# Patient Record
Sex: FEMALE | Race: WHITE
[De-identification: names, ages, dates, MRNs, and addresses within clinical notes are randomized per-mention and may not be internally consistent; named-entity substitution may affect disease eponyms.]

---

## 2018-02-09 ENCOUNTER — HOSPITAL ENCOUNTER (INPATIENT)
Dept: HOSPITAL 92 - ERS | Age: 58
LOS: 8 days | Discharge: NURSING, REHAB OR LONG-TERM CARE | DRG: 871 | End: 2018-02-17
Attending: INTERNAL MEDICINE | Admitting: INTERNAL MEDICINE
Payer: MEDICARE

## 2018-02-09 VITALS — BODY MASS INDEX: 48 KG/M2

## 2018-02-09 DIAGNOSIS — G35: ICD-10-CM

## 2018-02-09 DIAGNOSIS — Z79.899: ICD-10-CM

## 2018-02-09 DIAGNOSIS — E66.01: ICD-10-CM

## 2018-02-09 DIAGNOSIS — Z93.3: ICD-10-CM

## 2018-02-09 DIAGNOSIS — D69.59: ICD-10-CM

## 2018-02-09 DIAGNOSIS — Z88.1: ICD-10-CM

## 2018-02-09 DIAGNOSIS — A41.9: Primary | ICD-10-CM

## 2018-02-09 DIAGNOSIS — J18.9: ICD-10-CM

## 2018-02-09 DIAGNOSIS — I73.00: ICD-10-CM

## 2018-02-09 DIAGNOSIS — E86.0: ICD-10-CM

## 2018-02-09 DIAGNOSIS — D75.1: ICD-10-CM

## 2018-02-09 DIAGNOSIS — F32.9: ICD-10-CM

## 2018-02-09 DIAGNOSIS — B00.1: ICD-10-CM

## 2018-02-09 DIAGNOSIS — R32: ICD-10-CM

## 2018-02-09 DIAGNOSIS — E87.6: ICD-10-CM

## 2018-02-09 DIAGNOSIS — I10: ICD-10-CM

## 2018-02-09 LAB
ALBUMIN SERPL BCG-MCNC: 3.7 G/DL (ref 3.5–5)
ALP SERPL-CCNC: 146 U/L (ref 40–150)
ALT SERPL W P-5'-P-CCNC: 25 U/L (ref 8–55)
ANION GAP SERPL CALC-SCNC: 19 MMOL/L (ref 10–20)
AST SERPL-CCNC: 24 U/L (ref 5–34)
BASOPHILS # BLD AUTO: 0.1 THOU/UL (ref 0–0.2)
BASOPHILS NFR BLD AUTO: 0.6 % (ref 0–1)
BILIRUB SERPL-MCNC: 0.8 MG/DL (ref 0.2–1.2)
BUN SERPL-MCNC: 4 MG/DL (ref 9.8–20.1)
CALCIUM SERPL-MCNC: 10 MG/DL (ref 7.8–10.44)
CHLORIDE SERPL-SCNC: 99 MMOL/L (ref 98–107)
CK MB SERPL-MCNC: 0.3 NG/ML (ref 0–6.6)
CK SERPL-CCNC: 26 U/L (ref 29–168)
CO2 SERPL-SCNC: 26 MMOL/L (ref 22–29)
CREAT CL PREDICTED SERPL C-G-VRATE: 0 ML/MIN (ref 70–130)
EOSINOPHIL # BLD AUTO: 0.5 THOU/UL (ref 0–0.7)
EOSINOPHIL NFR BLD AUTO: 3.1 % (ref 0–10)
GLOBULIN SER CALC-MCNC: 4.2 G/DL (ref 2.4–3.5)
GLUCOSE SERPL-MCNC: 104 MG/DL (ref 70–105)
HGB BLD-MCNC: 17 G/DL (ref 12–16)
LYMPHOCYTES # BLD: 2.6 THOU/UL (ref 1.2–3.4)
LYMPHOCYTES NFR BLD AUTO: 15.1 % (ref 21–51)
MCH RBC QN AUTO: 29.6 PG (ref 27–31)
MCV RBC AUTO: 90.6 FL (ref 81–99)
MONOCYTES # BLD AUTO: 1.3 THOU/UL (ref 0.11–0.59)
MONOCYTES NFR BLD AUTO: 7.5 % (ref 0–10)
NEUTROPHILS # BLD AUTO: 12.4 THOU/UL (ref 1.4–6.5)
NEUTROPHILS NFR BLD AUTO: 73.7 % (ref 42–75)
PLATELET # BLD AUTO: 438 THOU/UL (ref 130–400)
POTASSIUM SERPL-SCNC: 3.8 MMOL/L (ref 3.5–5.1)
RBC # BLD AUTO: 5.74 MILL/UL (ref 4.2–5.4)
SODIUM SERPL-SCNC: 140 MMOL/L (ref 136–145)
TROPONIN I SERPL DL<=0.01 NG/ML-MCNC: (no result) NG/ML (ref ?–0.03)
WBC # BLD AUTO: 16.9 THOU/UL (ref 4.8–10.8)

## 2018-02-09 PROCEDURE — 96365 THER/PROPH/DIAG IV INF INIT: CPT

## 2018-02-09 PROCEDURE — 82553 CREATINE MB FRACTION: CPT

## 2018-02-09 PROCEDURE — 96367 TX/PROPH/DG ADDL SEQ IV INF: CPT

## 2018-02-09 PROCEDURE — 80048 BASIC METABOLIC PNL TOTAL CA: CPT

## 2018-02-09 PROCEDURE — 83605 ASSAY OF LACTIC ACID: CPT

## 2018-02-09 PROCEDURE — 87040 BLOOD CULTURE FOR BACTERIA: CPT

## 2018-02-09 PROCEDURE — 80053 COMPREHEN METABOLIC PANEL: CPT

## 2018-02-09 PROCEDURE — 93005 ELECTROCARDIOGRAM TRACING: CPT

## 2018-02-09 PROCEDURE — 94640 AIRWAY INHALATION TREATMENT: CPT

## 2018-02-09 PROCEDURE — 85025 COMPLETE CBC W/AUTO DIFF WBC: CPT

## 2018-02-09 PROCEDURE — 82550 ASSAY OF CK (CPK): CPT

## 2018-02-09 PROCEDURE — 36415 COLL VENOUS BLD VENIPUNCTURE: CPT

## 2018-02-09 PROCEDURE — 94760 N-INVAS EAR/PLS OXIMETRY 1: CPT

## 2018-02-09 PROCEDURE — A4216 STERILE WATER/SALINE, 10 ML: HCPCS

## 2018-02-09 PROCEDURE — 84484 ASSAY OF TROPONIN QUANT: CPT

## 2018-02-09 PROCEDURE — 87804 INFLUENZA ASSAY W/OPTIC: CPT

## 2018-02-09 PROCEDURE — 83880 ASSAY OF NATRIURETIC PEPTIDE: CPT

## 2018-02-09 PROCEDURE — 71045 X-RAY EXAM CHEST 1 VIEW: CPT

## 2018-02-09 PROCEDURE — 85379 FIBRIN DEGRADATION QUANT: CPT

## 2018-02-09 NOTE — RAD
PORTABLE CHEST 1 VIEW:

 

Date:  02/09/18 

Time:  1510 hours

 

HISTORY:  

Cough. 

 

FINDINGS/IMPRESSION: 

 

Comparison made with exam of 09/19/17. 

 

The heart size is prominent. There is suggestion of an infiltrate at the left lung base laterally. No
 pneumothoraces seen. A small accompanying left effusion cannot be excluded. No pneumothoraces or fra
nk pulmonary edema are seen. 

 

 

POS: [facility]

## 2018-02-09 NOTE — HP
PRIMARY CARE PHYSICIAN:  Rudy Mares M.D.

 

CHIEF COMPLAINT:  Cough.

 

HISTORY OF PRESENT ILLNESS:  Ms. Flores is a pleasant 57-year-old lady who was seen at [facility] on 02/09/2018.

 

She reports that she had a cough for the last 4 days.  She reports that the cough is productive of gr
eenish yellow sputum.  She also reports fevers, with a temperature of 101 degrees Fahrenheit at home.
  She also reports retrosternal burning sensation that started yesterday, worse with deep breathing. 
 She describes it as in the retrosternal region, nonradiating, constant, worse with deep breathing, n
ot accompanied by nausea.  No known aggravating or relieving factors.

 

She came to the emergency room because of ongoing cough.

 

REVIEW OF SYSTEMS:  The following complete review of systems was

negative, unless otherwise mentioned in the HPI or below:

Constitutional:  Weight loss or gain, ability to conduct usual

activities.

Skin:  Rash, itching.

Eyes:  Double vision, pain.

ENT/Mouth:  Nose bleeding, neck stiffness, pain, tenderness.

Cardiovascular:  Palpitations, dyspnea on exertion, orthopnea.

Respiratory:  Shortness of breath, wheezing, cough, hemoptysis, 

fever or night sweats.

Gastrointestinal:  Poor appetite, abdominal pain, heartburn, 

nausea, vomiting, constipation, or diarrhea.

Genitourinary:  Urgency, frequency, dysuria, nocturia.

Musculoskeletal:  Pain, swelling.

Neurologic/Psychiatric:  Anxiety, depression.

Allergy/Immunologic:  Skin rash, bleeding tendency.

 

PAST MEDICAL HISTORY:  Significant for progressive multiple sclerosis, ulcerative colitis, morbid obe
sity, chronic sinus tachycardia, hypertension, depression, Raynaud's syndrome, urinary incontinence w
ith chronic Martinez catheter and chronic lymphedema of lower extremities, Crohn's disease.

 

PAST SURGICAL HISTORY:  Significant for tubal ligation, laparoscopic ileostomy, colectomy and colosto
my.

 

FAMILY HISTORY:  The patient denies any family history of premature coronary artery disease.

 

ALLERGIES:  CLARITHROMYCIN.

 

CURRENT MEDICATIONS:  Include baclofen, Canasa, cranberry plus vitamin C, Prozac, cyclobenzaprine, ga
bapentin, Gas-X Ultra Strength, multivitamins, nortriptyline, Oscimin, stool softener, vitamin D3, al
buterol, Bactroban, Calmoseptine, ibuprofen, morphine, nystatin, Zofran, Robitussin, and acyclovir.

 

SOCIAL HISTORY:  The patient denies tobacco use, alcohol use, or recreational drug use.  She is an ex
-smoker.

 

PHYSICAL EXAMINATION:

GENERAL:  Mr. Flores is awake and alert, not in acute distress.

VITAL SIGNS:  Blood pressure is 120/87, pulse is 132.  She is breathing at rate of 20 and saturating 
98% on 2 liters of oxygen.  She is afebrile.  She is morbidly obese, weighing 158.76 kilograms.

EYES:  No scleral icterus.  No conjunctival pallor.

ENT:  Dry mucosal membranes.  No oropharyngeal erythema or exudates.

NECK:  Supple, nontender, normal range of moment.  Trachea is midline.

RESPIRATORY:  Accessory muscles of breathing are not active.  Chest wall movements are symmetric bila
terally.

LUNGS:  Clear to auscultation with diminished air entry at the left base.

CARDIOVASCULAR:  S1 and S2 are heard, tachycardic and regular.

LUNGS:  Peripheral pulses palpable.  No carotid bruit, no pericardial rub.

ABDOMEN:  Distended, soft, nontender, bowel sounds heard, she has an ostomy.

NEUROLOGIC:  Cranial nerves II-XII are intact.

MUSCULOSKELETAL:  Power is 5/5 in all 4 extremities.

SKIN:  She has what appears to be a healing herpetic lesion over the upper lip on the left side.

LYMPHATIC:  No cervical lymphadenopathy.

PSYCHIATRIC:  Normal mood, normal affect, patient is oriented to person, place, and time.

 

LABORATORY DATA:  Mr. Flores labs and investigations were reviewed.  I reviewed her electrocardiogram
, which shows sinus tachycardia, no ST changes to suggest an acute coronary syndrome.  She has a few 
premature atrial complexes.  I also reviewed her chest x-ray, which shows left-sided pleural effusion
 and possible left lower lobe infiltrate.  She has leukocytosis with 16,900 white cells, of which 73.
7% are neutrophils, elevated hemoglobin of 17, elevated platelet count of 438, normal electrolytes, n
ormal creatinine, and unremarkable liver profile.  Troponin I is normal.  BNP is less than 10.

 

ASSESSMENT AND PLAN:  Mr. Flores is a pleasant 57-year-old lady who was seen at [facility] on 02/09/2018.  Her problem list includes:

1.  Sepsis:  Mr. Flores is presenting with sepsis, most likely secondary to pneumonia.  She will be a
dmitted to the hospital for further management, including intravenous fluids and antibiotics.

2.  Community-acquired pneumonia.  She has already received levofloxacin, ceftriaxone, which I will c
ontinue.  We will also put her on DuoNebs as needed.

3.  Sinus tachycardia:  It is unclear whether this is from sepsis or from her past history of chronic
 sinus tachycardia.  I will continue to keep an eye on the heart rate.

4.  Multiple sclerosis.  Continue home medications once doses are clarified.

5.  Hypertension:  Monitor vital signs, titrate antihypertensives as needed.

6.  Polycythemia and thrombocythemia:  Probably secondary to dehydration.  Recheck hemoglobin and hoang
telet count.

 

Many thanks for allowing me to participate in your patient's care.  Please feel free to contact me wi
th any questions or concerns.

 

LEVEL OF RISK:  High.

 

LEVEL OF COMPLEXITY:  High.

## 2018-02-10 LAB
ANION GAP SERPL CALC-SCNC: 13 MMOL/L (ref 10–20)
BASOPHILS # BLD AUTO: 0 THOU/UL (ref 0–0.2)
BASOPHILS NFR BLD AUTO: 0.1 % (ref 0–1)
BUN SERPL-MCNC: 6 MG/DL (ref 9.8–20.1)
CALCIUM SERPL-MCNC: 9.5 MG/DL (ref 7.8–10.44)
CHLORIDE SERPL-SCNC: 101 MMOL/L (ref 98–107)
CO2 SERPL-SCNC: 26 MMOL/L (ref 22–29)
CREAT CL PREDICTED SERPL C-G-VRATE: 114 ML/MIN (ref 70–130)
EOSINOPHIL # BLD AUTO: 0.3 THOU/UL (ref 0–0.7)
EOSINOPHIL NFR BLD AUTO: 2.5 % (ref 0–10)
GLUCOSE SERPL-MCNC: 94 MG/DL (ref 70–105)
HGB BLD-MCNC: 15.5 G/DL (ref 12–16)
LYMPHOCYTES # BLD: 2.3 THOU/UL (ref 1.2–3.4)
LYMPHOCYTES NFR BLD AUTO: 16.3 % (ref 21–51)
MCH RBC QN AUTO: 29.2 PG (ref 27–31)
MCV RBC AUTO: 92 FL (ref 81–99)
MONOCYTES # BLD AUTO: 1.3 THOU/UL (ref 0.11–0.59)
MONOCYTES NFR BLD AUTO: 9.5 % (ref 0–10)
NEUTROPHILS # BLD AUTO: 10 THOU/UL (ref 1.4–6.5)
NEUTROPHILS NFR BLD AUTO: 71.6 % (ref 42–75)
PLATELET # BLD AUTO: 400 THOU/UL (ref 130–400)
POTASSIUM SERPL-SCNC: 3.3 MMOL/L (ref 3.5–5.1)
RBC # BLD AUTO: 5.32 MILL/UL (ref 4.2–5.4)
SODIUM SERPL-SCNC: 137 MMOL/L (ref 136–145)
WBC # BLD AUTO: 13.9 THOU/UL (ref 4.8–10.8)

## 2018-02-10 RX ADMIN — SIMETHICONE SCH MG: 80 TABLET, CHEWABLE ORAL at 21:57

## 2018-02-10 RX ADMIN — SIMETHICONE SCH MG: 80 TABLET, CHEWABLE ORAL at 13:23

## 2018-02-10 RX ADMIN — BRIMONIDINE TARTRATE SCH DROP: 2 SOLUTION OPHTHALMIC at 16:15

## 2018-02-10 RX ADMIN — BRIMONIDINE TARTRATE SCH DROP: 2 SOLUTION OPHTHALMIC at 20:31

## 2018-02-10 RX ADMIN — SIMETHICONE SCH MG: 80 TABLET, CHEWABLE ORAL at 20:30

## 2018-02-10 NOTE — PDOC.PN
- Subjective


Encounter Start Date: 02/10/18


Encounter Start Time: 08:20





Pt seen for followup re: sepsis.  Says she still has cough.  Sputum+





- Objective


MAR Reviewed: Yes


Vital Signs & Weight: 


 Vital Signs (12 hours)











  Temp Pulse Resp BP Pulse Ox


 


 02/10/18 16:58  99.5 F  120 H  18  142/84 H  95


 


 02/10/18 14:03   116 H  18   99


 


 02/10/18 11:36  99.2 F  109 H  18  149/86 H  95


 


 02/10/18 08:00  99.2 F  109 H  18   97


 


 02/10/18 07:46  99.0 F  102 H  16  135/93 H  97











I&O: 


 











 02/09/18 02/10/18 02/11/18





 06:59 06:59 06:59


 


Intake Total  430 100


 


Output Total  650 175


 


Balance  -220 -75











Result Diagrams: 


 02/10/18 05:05





 02/10/18 05:05





Phys Exam





- Physical Examination


Constitutional: NAD


HEENT: PERRLA, moist MMs, sclera anicteric, oral pharynx no lesions


Neck: no nodes, no JVD, supple, full ROM


Respiratory: no wheezing, no rales, no rhonchi, clear to auscultation bilateral


Cardiovascular: RRR, no rub


Gastrointestinal: soft, non-tender, no distention, positive bowel sounds


ostomy


Neurological: moves all 4 limbs


Psychiatric: normal affect





Dx/Plan


(1) Sepsis


Code(s): A41.9 - SEPSIS, UNSPECIFIED ORGANISM   Status: Acute   





(2) Hypokalemia


Code(s): E87.6 - HYPOKALEMIA   Status: Acute   





(3) Pneumonia


Code(s): J18.9 - PNEUMONIA, UNSPECIFIED ORGANISM   Status: Acute   





(4) Multiple sclerosis, secondary progressive


Code(s): G35 - MULTIPLE SCLEROSIS   Status: Chronic   





(5) Sinus tachycardia


Code(s): R00.0 - TACHYCARDIA, UNSPECIFIED   Status: Chronic   





(6) HTN (hypertension)


Code(s): I10 - ESSENTIAL (PRIMARY) HYPERTENSION   Status: Chronic   





- Plan


continue antibiotics, PT/OT





* .


Replace potassium.


Continue antibiotics as below.


Monitor vital signs, titrate antihypertensives as needed.





Review of Systems





- Review of Systems


Constitutional: negative: fever, chills, sweats, weakness, malaise


Respiratory: Cough, Sputum.  negative: Dry, Shortness of Breath, Hemoptysis, 

SOB with Excertion, Pleuritic Pain, Wheezing


Cardiovascular: negative: chest pain, palpitations, orthopnea, paroxysmal 

nocturnal dyspnea, edema, light headedness


Gastrointestinal: negative: Nausea, Vomiting, Abdominal Pain, Diarrhea, 

Constipation, Melena, Hematochezia


Genitourinary: negative: Dysuria, Frequency, Incontinence, Hematuria, Retention





- Medications/Allergies


Allergies/Adverse Reactions: 


 Allergies











Allergy/AdvReac Type Severity Reaction Status Date / Time


 


clarithromycin [From Biaxin] Allergy  Hives Verified 02/09/18 22:11











Medications: 


 Current Medications





Acetaminophen (Tylenol)  650 mg PO Q4H PRN


   PRN Reason: Headache/Fever or Pain


   Last Admin: 02/09/18 22:47 Dose:  650 mg


Acetaminophen (Tylenol)  650 mg MN Q4H PRN


   PRN Reason: Headache/Fever or Pain


Hydrocodone Bitart/Acetaminophen (Norco 5/325)  1 tab PO Q6H PRN


   PRN Reason: Moderate Pain (4-6)


Hydrocodone Bitart/Acetaminophen (Norco 5/325)  2 tab PO Q6H PRN


   PRN Reason: Severe Pain (7-10)


Albuterol/Ipratropium (Duoneb)  3 ml NEB [id number] PRN


   PRN Reason: SOB &/or Wheezing


   Last Admin: 02/10/18 14:03 Dose:  3 ml


Baclofen (Lioresal)  10 mg PO QID [facility]


   Last Admin: 02/10/18 13:23 Dose:  10 mg


Bisacodyl (Dulcolax)  10 mg PO DAILYPRN PRN


   PRN Reason: Constipation


Brimonidine Tartrate (Alphagan 0.2% [facility])  1 drop R EYE TID [facility]


   Last Admin: 02/10/18 16:15 Dose:  1 drop


Cholecalciferol (Vitamin D3)  2,000 units PO DAILY [facility]


Cyclobenzaprine HCl (Flexeril)  10 mg PO TID [facility]


   Last Admin: 02/10/18 16:11 Dose:  10 mg


Docusate Sodium (Colace)  100 mg PO DAILY [facility]


Enoxaparin Sodium (Lovenox)  40 mg SC 0900 [facility]


   Last Admin: 02/10/18 09:16 Dose:  40 mg


Fluoxetine HCl (Prozac)  20 mg PO BID [facility]


Gabapentin (Neurontin)  800 mg PO BID [facility]


Guaifenesin/Dextromethorphan (Robitussin Dm)  5 ml PO Q6HR PRN


   PRN Reason: Congestion


Hyoscyamine Sulfate (Levsin)  0.25 mg PO ACHS [facility]


   Last Admin: 02/10/18 13:23 Dose:  0.25 mg


Levofloxacin 750 mg/ Device  150 mls @ 100 mls/hr IVPB 1700 [facility]


   Last Admin: 02/10/18 16:11 Dose:  150 mls


Ceftriaxone Sodium 1 gm/ (Syringe 0.4 ml/ Sterile Water)  10 mls @ 120 mls/hr 

SLOW IVP 1700 [facility]


Ibuprofen (Motrin)  800 mg PO Q6H PRN


   PRN Reason: Fever>101/MILD Pain


Latanoprost (Xalatan 0.005% Ophth Soln)  1 drop EA EYE HS [facility]


Melatonin (Melatonin)  3 mg PO HS [facility]


Mesalamine (Canasa)  1,000 mg MN HS PRN


   PRN Reason: Bleeding


(Acyclovir [ Acyclovir 3% Ointment] 0.05 Applic)  0.05 applic TOP Q4HR [facility]


Ondansetron HCl (Zofran Odt)  4 mg PO Q8H PRN


   PRN Reason: Nausea/Vomiting


Ondansetron HCl (Zofran)  4 mg IVP Q6H PRN


   PRN Reason: Nausea/Vomiting


   Last Admin: 02/10/18 10:32 Dose:  4 mg


Prenatal Multivit/Folic Acid/Iron (Prenatal Vitamin)  1 tab PO DAILY [facility]


Simethicone (Mylicon Chewable)  180 mg PO [facility]


   Last Admin: 02/10/18 13:23 Dose:  180 mg

## 2018-02-11 LAB
ANION GAP SERPL CALC-SCNC: 12 MMOL/L (ref 10–20)
BASOPHILS # BLD AUTO: 0.1 THOU/UL (ref 0–0.2)
BASOPHILS NFR BLD AUTO: 0.5 % (ref 0–1)
BUN SERPL-MCNC: 10 MG/DL (ref 9.8–20.1)
CALCIUM SERPL-MCNC: 10.1 MG/DL (ref 7.8–10.44)
CHLORIDE SERPL-SCNC: 102 MMOL/L (ref 98–107)
CO2 SERPL-SCNC: 31 MMOL/L (ref 22–29)
CREAT CL PREDICTED SERPL C-G-VRATE: 78 ML/MIN (ref 70–130)
EOSINOPHIL # BLD AUTO: 0.6 THOU/UL (ref 0–0.7)
EOSINOPHIL NFR BLD AUTO: 5.1 % (ref 0–10)
GLUCOSE SERPL-MCNC: 108 MG/DL (ref 70–105)
HGB BLD-MCNC: 14.6 G/DL (ref 12–16)
LYMPHOCYTES # BLD: 2.9 THOU/UL (ref 1.2–3.4)
LYMPHOCYTES NFR BLD AUTO: 22.8 % (ref 21–51)
MCH RBC QN AUTO: 29.3 PG (ref 27–31)
MCV RBC AUTO: 92.2 FL (ref 81–99)
MONOCYTES # BLD AUTO: 1.4 THOU/UL (ref 0.11–0.59)
MONOCYTES NFR BLD AUTO: 11.1 % (ref 0–10)
NEUTROPHILS # BLD AUTO: 7.7 THOU/UL (ref 1.4–6.5)
NEUTROPHILS NFR BLD AUTO: 60.6 % (ref 42–75)
PLATELET # BLD AUTO: 417 THOU/UL (ref 130–400)
POTASSIUM SERPL-SCNC: 5.1 MMOL/L (ref 3.5–5.1)
RBC # BLD AUTO: 4.97 MILL/UL (ref 4.2–5.4)
SODIUM SERPL-SCNC: 140 MMOL/L (ref 136–145)
WBC # BLD AUTO: 12.7 THOU/UL (ref 4.8–10.8)

## 2018-02-11 RX ADMIN — SIMETHICONE SCH MG: 80 TABLET, CHEWABLE ORAL at 20:52

## 2018-02-11 RX ADMIN — SIMETHICONE SCH MG: 80 TABLET, CHEWABLE ORAL at 17:32

## 2018-02-11 RX ADMIN — BRIMONIDINE TARTRATE SCH DROP: 2 SOLUTION OPHTHALMIC at 20:48

## 2018-02-11 RX ADMIN — SIMETHICONE SCH MG: 80 TABLET, CHEWABLE ORAL at 12:59

## 2018-02-11 RX ADMIN — SIMETHICONE SCH MG: 80 TABLET, CHEWABLE ORAL at 08:55

## 2018-02-11 RX ADMIN — GUAIFENESIN AND DEXTROMETHORPHAN HYDROBROMIDE SCH TAB: 600; 30 TABLET, EXTENDED RELEASE ORAL at 20:49

## 2018-02-11 RX ADMIN — BRIMONIDINE TARTRATE SCH DROP: 2 SOLUTION OPHTHALMIC at 15:26

## 2018-02-11 RX ADMIN — GUAIFENESIN AND DEXTROMETHORPHAN HYDROBROMIDE SCH TAB: 600; 30 TABLET, EXTENDED RELEASE ORAL at 10:26

## 2018-02-11 RX ADMIN — BRIMONIDINE TARTRATE SCH DROP: 2 SOLUTION OPHTHALMIC at 08:57

## 2018-02-11 NOTE — PRG
DATE OF SERVICE:  02/11/2018

 

SUBJECTIVE:  The patient was seen and examined at the bedside.  She is doing better, but she still ha
s quite a bit of cough, which is most of the time nonproductive.  Each time she takes deep breath in,
 it would take her airways and triggers the cough.  Her appetite is fair.  She stays in bed all the t
ramya except for the time when she is transferred to the wheelchair, but this is not accomplished yet.

 

OBJECTIVE:

VITAL SIGNS:  Blood pressure is 146/87, pulse is 97, temperature is 98, respiratory rate is 22, and p
ulse oximetry is 97% on 2 liters by nasal cannula.

GENERAL:  Ms. Flores is obese lady.

HEENT:  Head atraumatic, normocephalic.  She has a herpetic lesion on her lower lip, which is dry up.
  Her eyes are PERRLA.  Sclerae is nonicteric.

NECK:  Obese.

LUNGS:  Breath sounds diminished, especially at the left base with few crackles and rales, no wheezin
g.

CARDIOVASCULAR:  S1, S2, mildly tachycardic.  No S3, no S4.

ABDOMEN:  Obese, slightly tender to deep palpation in the right upper quadrant, and colostomy bag in 
the left lower abdomen is present with some brownish stool liquidy.

EXTREMITIES:  A 1-2+ peripheral edema.

 

LABORATORY DATA:  Showed a white count of 12.7, hemoglobin 14.6, hematocrit 45.8, platelet count is 4
17, normal electrolytes, CO2 of 31, BUN 10, creatinine 0.86, glucose 108.  Microbiology negative for 
influenza type A and B and the 2 blood cultures are negative.

 

IMPRESSION:

1.  Pneumonia.

2.  Hypokalemia, improved.

3.  Multiple sclerosis, progressive.

4.  Hypertension.

5.  Herpes labialis.

6.  Status post colostomy placement.

 

PLAN:  Discontinue Rocephin.  Continue Levaquin.  Start Mucinex DM.  Start physical therapy.  We are 
going to get her bed with trapeze, so she can use her upper extremities until progressive that is wha
t she does at home.  We will lower her oxygenation to 1 liter per nasal cannula since her pulse oxime
try is 97% on 2 liters.  We would continue the rest of the regimen.

## 2018-02-12 LAB
ANION GAP SERPL CALC-SCNC: 12 MMOL/L (ref 10–20)
BASOPHILS # BLD AUTO: 0.1 THOU/UL (ref 0–0.2)
BASOPHILS NFR BLD AUTO: 0.5 % (ref 0–1)
BUN SERPL-MCNC: 7 MG/DL (ref 9.8–20.1)
CALCIUM SERPL-MCNC: 9.5 MG/DL (ref 7.8–10.44)
CHLORIDE SERPL-SCNC: 101 MMOL/L (ref 98–107)
CO2 SERPL-SCNC: 28 MMOL/L (ref 22–29)
CREAT CL PREDICTED SERPL C-G-VRATE: 108 ML/MIN (ref 70–130)
EOSINOPHIL # BLD AUTO: 0.6 THOU/UL (ref 0–0.7)
EOSINOPHIL NFR BLD AUTO: 4.4 % (ref 0–10)
GLUCOSE SERPL-MCNC: 92 MG/DL (ref 70–105)
HGB BLD-MCNC: 14.6 G/DL (ref 12–16)
LYMPHOCYTES # BLD: 3 THOU/UL (ref 1.2–3.4)
LYMPHOCYTES NFR BLD AUTO: 24.3 % (ref 21–51)
MCH RBC QN AUTO: 29.3 PG (ref 27–31)
MCV RBC AUTO: 91.2 FL (ref 81–99)
MONOCYTES # BLD AUTO: 1.2 THOU/UL (ref 0.11–0.59)
MONOCYTES NFR BLD AUTO: 9.4 % (ref 0–10)
NEUTROPHILS # BLD AUTO: 7.7 THOU/UL (ref 1.4–6.5)
NEUTROPHILS NFR BLD AUTO: 61.3 % (ref 42–75)
PLATELET # BLD AUTO: 456 THOU/UL (ref 130–400)
POTASSIUM SERPL-SCNC: 3.5 MMOL/L (ref 3.5–5.1)
RBC # BLD AUTO: 5 MILL/UL (ref 4.2–5.4)
SODIUM SERPL-SCNC: 137 MMOL/L (ref 136–145)
WBC # BLD AUTO: 12.5 THOU/UL (ref 4.8–10.8)

## 2018-02-12 RX ADMIN — BRIMONIDINE TARTRATE SCH DROP: 2 SOLUTION OPHTHALMIC at 21:31

## 2018-02-12 RX ADMIN — SIMETHICONE SCH MG: 80 TABLET, CHEWABLE ORAL at 21:33

## 2018-02-12 RX ADMIN — SIMETHICONE SCH MG: 80 TABLET, CHEWABLE ORAL at 10:53

## 2018-02-12 RX ADMIN — GUAIFENESIN AND DEXTROMETHORPHAN HYDROBROMIDE SCH TAB: 600; 30 TABLET, EXTENDED RELEASE ORAL at 21:32

## 2018-02-12 RX ADMIN — GUAIFENESIN AND DEXTROMETHORPHAN HYDROBROMIDE SCH TAB: 600; 30 TABLET, EXTENDED RELEASE ORAL at 08:22

## 2018-02-12 RX ADMIN — BRIMONIDINE TARTRATE SCH DROP: 2 SOLUTION OPHTHALMIC at 08:19

## 2018-02-12 RX ADMIN — SIMETHICONE SCH MG: 80 TABLET, CHEWABLE ORAL at 14:57

## 2018-02-12 RX ADMIN — SIMETHICONE SCH MG: 80 TABLET, CHEWABLE ORAL at 17:51

## 2018-02-12 RX ADMIN — BRIMONIDINE TARTRATE SCH DROP: 2 SOLUTION OPHTHALMIC at 14:56

## 2018-02-12 NOTE — PDOC.PN
- Subjective


Encounter Start Date: 02/12/18


Encounter Start Time: 16:09


Subjective: c/o coughing spells w nausea and SOB





- Objective


MAR Reviewed: Yes


Vital Signs & Weight: 


 Vital Signs (12 hours)











  Temp Pulse Pulse Resp BP Pulse Ox Pulse Ox


 


 02/12/18 11:38  98.7 F  108 H   22 H  117/73  95 


 


 02/12/18 09:13    107 H     94 L


 


 02/12/18 08:00  98.7 F  108 H   22 H   95 


 


 02/12/18 07:36  99.0 F  106 H   16  126/82  92 L 











I&O: 


 











 02/11/18 02/12/18 02/13/18





 06:59 06:59 06:59


 


Intake Total 230 630 


 


Output Total 875 1900 


 


Balance -645 -1270 











Result Diagrams: 


 02/12/18 04:36





 02/12/18 04:36


Additional Labs: 





Microbiology





02/09/18 16:22   Nasal swab   Influenza Types A,B Direct EIA - Final


02/09/18 16:02   Venous blood - Right Hand   Blood Culture - Preliminary


                              NO GROWTH AT 48 HOURS


02/09/18 15:38   Venous blood - Left Arm   Blood Culture - Preliminary


                              NO GROWTH AT 48 HOURS











Phys Exam





- Physical Examination


Constitutional: NAD


HEENT: PERRLA, moist MMs, sclera anicteric, oral pharynx no lesions, 2+ tonsils


Neck: no nodes, no JVD, supple, full ROM


Respiratory: no wheezing, no rales, no rhonchi, clear to auscultation bilateral


Cardiovascular: RRR, no significant murmur, no rub, gallop


Gastrointestinal: soft, non-tender, no distention, positive bowel sounds


Musculoskeletal: no edema, pulses present


paralysis lower extremities


Psychiatric: normal affect, A&O x 3


Skin: no rash





Dx/Plan


(1) Pneumonia


Code(s): J18.9 - PNEUMONIA, UNSPECIFIED ORGANISM   Status: Acute   


Qualifiers: 


   Laterality: left   Lung location: lower lobe of lung 





(2) Sepsis


Code(s): A41.9 - SEPSIS, UNSPECIFIED ORGANISM   Status: Acute   





(3) HTN (hypertension)


Code(s): I10 - ESSENTIAL (PRIMARY) HYPERTENSION   Status: Chronic   





(4) Morbid obesity


Code(s): E66.01 - MORBID (SEVERE) OBESITY DUE TO EXCESS CALORIES   Status: 

Acute   





(5) Ulcerative colitis


Code(s): K51.90 - ULCERATIVE COLITIS, UNSPECIFIED, WITHOUT COMPLICATIONS   

Status: Acute   





(6) Multiple sclerosis, secondary progressive


Code(s): G35 - MULTIPLE SCLEROSIS   Status: Chronic   





(7) Colostomy in place


Code(s): Z93.3 - COLOSTOMY STATUS   Status: Acute   





- Plan


continue antibiotics, PT/OT, respiratory therapy, incentive spirometry, DVT 

proph w/SCDs


Add Flonase,albuterol for cough.cont ABx


-: will try to find Trapeze for upper body mobilization


-: hemodynamically stable.


-: DC ronel ein next 24-48 hours


-: am labs





* .








Review of Systems





- Review of Systems


Constitutional: negative: fever, chills, sweats, weakness, malaise, other


Respiratory: Cough, Shortness of Breath.  negative: Dry, Hemoptysis, SOB with 

Excertion, Pleuritic Pain, Sputum, Wheezing


Cardiovascular: negative: chest pain, palpitations, orthopnea, paroxysmal 

nocturnal dyspnea, edema, light headedness, other


Gastrointestinal: negative: Nausea, Vomiting, Abdominal Pain, Diarrhea, 

Constipation, Melena, Hematochezia, Other


Genitourinary: negative: Dysuria, Frequency, Incontinence, Hematuria, Retention

, Other


Musculoskeletal: negative: Neck Pain, Shoulder Pain, Arm Pain, Back Pain, Hand 

Pain, Leg Pain, Foot Pain, Other


Skin: negative: Rash, Lesions, Giovany, Bruising, Other


Neurological: negative: Weakness, Numbness, Incoordination, Change in Speech, 

Confusion, Seizures, Other





- Medications/Allergies


Allergies/Adverse Reactions: 


 Allergies











Allergy/AdvReac Type Severity Reaction Status Date / Time


 


clarithromycin [From Biaxin] Allergy  Hives Verified 02/09/18 22:11











Medications: 


 Current Medications





Acetaminophen (Tylenol)  650 mg PO Q4H PRN


   PRN Reason: Headache/Fever or Pain


   Last Admin: 02/09/18 22:47 Dose:  650 mg


Acetaminophen (Tylenol)  650 mg [place] Q4H PRN


   PRN Reason: Headache/Fever or Pain


Hydrocodone Bitart/Acetaminophen (Norco 5/325)  1 tab PO Q6H PRN


   PRN Reason: Moderate Pain (4-6)


Hydrocodone Bitart/Acetaminophen (Norco 5/325)  2 tab PO Q6H PRN


   PRN Reason: Severe Pain (7-10)


Albuterol Sulfate (Proventil Hfa)  1 puff INH Q4H PRN


   PRN Reason: SOB &/or Wheezing


Albuterol/Ipratropium (Duoneb)  3 ml NEB [id number] PRN


   PRN Reason: SOB &/or Wheezing


   Last Admin: 02/10/18 22:32 Dose:  3 ml


Baclofen (Lioresal)  10 mg PO QID [facility]


   Last Admin: 02/12/18 14:57 Dose:  10 mg


Bisacodyl (Dulcolax)  10 mg PO DAILYPRN PRN


   PRN Reason: Constipation


Brimonidine Tartrate (Alphagan 0.2% Ophth Soln)  1 drop R EYE TID [facility]


   Last Admin: 02/12/18 14:56 Dose:  1 drop


Cholecalciferol (Vitamin D3)  2,000 units PO DAILY [facility]


   Last Admin: 02/12/18 08:20 Dose:  2,000 units


Cyclobenzaprine HCl (Flexeril)  10 mg PO TID [facility]


   Last Admin: 02/12/18 14:56 Dose:  10 mg


Docusate Sodium (Colace)  100 mg PO DAILY [facility]


   Last Admin: 02/12/18 08:23 Dose:  100 mg


Enoxaparin Sodium (Lovenox)  40 mg SC 0900 [facility]


   Last Admin: 02/12/18 08:23 Dose:  40 mg


Fluoxetine HCl (Prozac)  20 mg PO BID [facility]


   Last Admin: 02/12/18 08:22 Dose:  20 mg


Fluticasone Propionate (Flonase Nasal Spray)  1 gm NASAL DAILY [facility]


Gabapentin (Neurontin)  800 mg PO BID [facility]


   Last Admin: 02/12/18 08:21 Dose:  800 mg


Guaifenesin/Dextromethorphan (Robitussin Dm)  5 ml PO Q6HR PRN


   PRN Reason: Congestion


Guaifenesin/Dextromethorphan (Mucinex Dm)  2 tab PO Q12HR [facility]


   Last Admin: 02/12/18 08:22 Dose:  2 tab


Hyoscyamine Sulfate (Levsin)  0.25 mg PO ACHS [facility]


   Last Admin: 02/12/18 10:52 Dose:  0.25 mg


Levofloxacin 750 mg/ Device  150 mls @ 100 mls/hr IVPB 1700 [facility]


   Last Admin: 02/11/18 17:30 Dose:  150 mls


Ibuprofen (Motrin)  800 mg PO Q6H PRN


   PRN Reason: Fever>101/MILD Pain


   Last Admin: 02/10/18 20:35 Dose:  800 mg


Latanoprost (Xalatan 0.005% Ophth Soln)  1 drop EA EYE [facility]


   Last Admin: 02/11/18 20:50 Dose:  1 drp


Melatonin (Melatonin)  3 mg PO [facility]


   Last Admin: 02/11/18 20:51 Dose:  3 mg


Mesalamine (Canasa)  1,000 mg MI HS PRN


   PRN Reason: Bleeding


(Acyclovir [ Acyclovir 3% Ointment] 0.05 Applic)  0.05 applic TOP Q4HR [facility]


Ondansetron HCl (Zofran Odt)  4 mg PO Q8H PRN


   PRN Reason: Nausea/Vomiting


Ondansetron HCl (Zofran)  4 mg IVP Q6H PRN


   PRN Reason: Nausea/Vomiting


   Last Admin: 02/10/18 10:32 Dose:  4 mg


Prenatal Multivit/Folic Acid/Iron (Prenatal Vitamin)  1 tab PO DAILY [facility]


   Last Admin: 02/12/18 08:23 Dose:  1 tab


Simethicone (Mylicon Chewable)  180 mg PO [facility]


   Last Admin: 02/12/18 14:57 Dose:  180 mg

## 2018-02-13 RX ADMIN — SIMETHICONE SCH MG: 80 TABLET, CHEWABLE ORAL at 12:38

## 2018-02-13 RX ADMIN — BRIMONIDINE TARTRATE SCH DROP: 2 SOLUTION OPHTHALMIC at 22:01

## 2018-02-13 RX ADMIN — SIMETHICONE SCH MG: 80 TABLET, CHEWABLE ORAL at 16:23

## 2018-02-13 RX ADMIN — GUAIFENESIN AND DEXTROMETHORPHAN HYDROBROMIDE SCH TAB: 600; 30 TABLET, EXTENDED RELEASE ORAL at 10:42

## 2018-02-13 RX ADMIN — SIMETHICONE SCH MG: 80 TABLET, CHEWABLE ORAL at 20:33

## 2018-02-13 RX ADMIN — SIMETHICONE SCH MG: 80 TABLET, CHEWABLE ORAL at 10:39

## 2018-02-13 RX ADMIN — GUAIFENESIN AND DEXTROMETHORPHAN HYDROBROMIDE SCH TAB: 600; 30 TABLET, EXTENDED RELEASE ORAL at 20:33

## 2018-02-13 RX ADMIN — BRIMONIDINE TARTRATE SCH DROP: 2 SOLUTION OPHTHALMIC at 16:22

## 2018-02-13 RX ADMIN — BRIMONIDINE TARTRATE SCH DROP: 2 SOLUTION OPHTHALMIC at 10:43

## 2018-02-13 NOTE — PDOC.PN
- Subjective


Encounter Start Date: 02/13/18


Encounter Start Time: 14:18


Subjective: feels better.stronger w PT.wants to continue





- Objective


MAR Reviewed: Yes


Vital Signs & Weight: 


 Vital Signs (12 hours)











  Temp Pulse Resp BP Pulse Ox


 


 02/13/18 12:00  97.9 F  100  20  128/87  94 L


 


 02/13/18 08:00  98.2 F  93  16   94 L


 


 02/13/18 07:30  98.2 F  93  16  136/87  91 L


 


 02/13/18 04:00  98.2 F  101 H  20  132/88  94 L











I&O: 


 











 02/12/18 02/13/18 02/14/18





 06:59 06:59 06:59


 


Intake Total 630 260 


 


Output Total 1900 1580 16


 


Balance -1270 -1320 -16











Result Diagrams: 


 02/12/18 04:36





 02/12/18 04:36


Additional Labs: 





Microbiology





02/09/18 16:22   Nasal swab   Influenza Types A,B Direct EIA - Final


02/09/18 16:02   Venous blood - Right Hand   Blood Culture - Preliminary


                              NO GROWTH AT 48 HOURS


02/09/18 15:38   Venous blood - Left Arm   Blood Culture - Preliminary


                              NO GROWTH AT 48 HOURS











Phys Exam





- Physical Examination


Constitutional: NAD


HEENT: PERRLA, moist MMs, sclera anicteric, oral pharynx no lesions


Neck: no nodes, no JVD, supple, full ROM


Respiratory: no wheezing, no rales, no rhonchi, clear to auscultation bilateral


Cardiovascular: RRR, no significant murmur


Gastrointestinal: soft, non-tender, no distention, positive bowel sounds


Musculoskeletal: no edema, pulses present


Neurological: non-focal


LE paralysis


Psychiatric: normal affect, A&O x 3


Skin: no rash





Dx/Plan


(1) Pneumonia


Code(s): J18.9 - PNEUMONIA, UNSPECIFIED ORGANISM   Status: Acute   


Qualifiers: 


   Laterality: left   Lung location: lower lobe of lung 





(2) Sepsis


Code(s): A41.9 - SEPSIS, UNSPECIFIED ORGANISM   Status: Acute   





(3) HTN (hypertension)


Code(s): I10 - ESSENTIAL (PRIMARY) HYPERTENSION   Status: Chronic   





(4) Morbid obesity


Code(s): E66.01 - MORBID (SEVERE) OBESITY DUE TO EXCESS CALORIES   Status: 

Acute   





(5) Ulcerative colitis


Code(s): K51.90 - ULCERATIVE COLITIS, UNSPECIFIED, WITHOUT COMPLICATIONS   

Status: Acute   





(6) Multiple sclerosis, secondary progressive


Code(s): G35 - MULTIPLE SCLEROSIS   Status: Chronic   





(7) Colostomy in place


Code(s): Z93.3 - COLOSTOMY STATUS   Status: Acute   





- Plan


continue antibiotics, respiratory therapy, incentive spirometry, DVT proph w/

SCDs


Pt requesting Rehab ,will have eval done.


-: cont levaquin PO.nebs prn.clinically better


-: DC to rehab tomorrow if accepted.if not,resume HH


-: hemodynamically stable





* .








Review of Systems





- Review of Systems


Constitutional: negative: fever, chills, sweats, weakness, malaise, other


ENT: negative: Ear Pain, Ear Discharge, Nose Pain, Nose Discharge, Nose 

Congestion, Mouth Pain, Mouth Swelling, Throat Pain, Throat Swelling, Other


Respiratory: negative: Cough, Dry, Shortness of Breath, Hemoptysis, SOB with 

Excertion, Pleuritic Pain, Sputum, Wheezing


Cardiovascular: negative: chest pain, palpitations, orthopnea, paroxysmal 

nocturnal dyspnea, edema, light headedness, other


Gastrointestinal: negative: Nausea, Vomiting, Abdominal Pain, Diarrhea, 

Constipation, Melena, Hematochezia, Other


Genitourinary: negative: Dysuria, Frequency, Incontinence, Hematuria, Retention

, Other


Musculoskeletal: negative: Neck Pain, Shoulder Pain, Arm Pain, Back Pain, Hand 

Pain, Leg Pain, Foot Pain, Other


Skin: negative: Rash, Lesions, Giovany, Bruising, Other


Neurological: negative: Weakness, Numbness, Incoordination, Change in Speech, 

Confusion, Seizures, Other





- Medications/Allergies


Allergies/Adverse Reactions: 


 Allergies











Allergy/AdvReac Type Severity Reaction Status Date / Time


 


clarithromycin [From Biaxin] Allergy  Hives Verified 02/09/18 22:11











Medications: 


 Current Medications





Acetaminophen (Tylenol)  650 mg PO Q4H PRN


   PRN Reason: Headache/Fever or Pain


   Last Admin: 02/09/18 22:47 Dose:  650 mg


Acetaminophen (Tylenol)  650 mg [place] Q4H PRN


   PRN Reason: Headache/Fever or Pain


Hydrocodone Bitart/Acetaminophen (Norco 5/325)  1 tab PO Q6H PRN


   PRN Reason: Moderate Pain (4-6)


Hydrocodone Bitart/Acetaminophen (Norco 5/325)  2 tab PO Q6H PRN


   PRN Reason: Severe Pain (7-10)


Albuterol Sulfate (Proventil Hfa)  1 puff INH Q4H PRN


   PRN Reason: SOB &/or Wheezing


Albuterol/Ipratropium (Duoneb)  3 ml NEB [id number] PRN


   PRN Reason: SOB &/or Wheezing


   Last Admin: 02/10/18 22:32 Dose:  3 ml


Baclofen (Lioresal)  10 mg PO QID [facility]


   Last Admin: 02/13/18 12:40 Dose:  10 mg


Bisacodyl (Dulcolax)  10 mg PO DAILYPRN PRN


   PRN Reason: Constipation


Brimonidine Tartrate (Alphagan 0.2% [facility])  1 drop R EYE TID [facility]


   Last Admin: 02/13/18 10:43 Dose:  1 drop


Cholecalciferol (Vitamin D3)  2,000 units PO DAILY [facility]


   Last Admin: 02/13/18 10:42 Dose:  2,000 units


Cyclobenzaprine HCl (Flexeril)  10 mg PO TID [facility]


   Last Admin: 02/13/18 10:42 Dose:  10 mg


Docusate Sodium (Colace)  100 mg PO DAILY [facility]


   Last Admin: 02/13/18 10:42 Dose:  100 mg


Enoxaparin Sodium (Lovenox)  40 mg SC 0900 [facility]


   Last Admin: 02/13/18 10:41 Dose:  40 mg


Fluoxetine HCl (Prozac)  20 mg PO BID [facility]


   Last Admin: 02/13/18 10:43 Dose:  20 mg


Fluticasone Propionate (Flonase Nasal Spray)  1 gm NASAL DAILY [facility]


   Last Admin: 02/13/18 10:42 Dose:  1 spr


Gabapentin (Neurontin)  800 mg PO BID [facility]


   Last Admin: 02/13/18 10:42 Dose:  800 mg


Guaifenesin/Dextromethorphan (Robitussin Dm)  5 ml PO Q6HR PRN


   PRN Reason: Congestion


Guaifenesin/Dextromethorphan (Mucinex Dm)  2 tab PO Q12HR [facility]


   Last Admin: 02/13/18 10:42 Dose:  2 tab


Hyoscyamine Sulfate (Levsin)  0.25 mg PO ACHS [facility]


   Last Admin: 02/13/18 10:44 Dose:  Not Given


Levofloxacin 750 mg/ Device  150 mls @ 100 mls/hr IVPB 1700 [facility]


   Last Admin: 02/12/18 19:14 Dose:  Not Given


Ibuprofen (Motrin)  800 mg PO Q6H PRN


   PRN Reason: Fever>101/MILD Pain


   Last Admin: 02/12/18 21:33 Dose:  800 mg


Levofloxacin (Levaquin)  750 mg PO 2000 [facility]


   Last Admin: 02/12/18 21:30 Dose:  750 mg


Melatonin (Melatonin)  3 mg PO HS [facility]


   Last Admin: 02/12/18 21:33 Dose:  3 mg


Mesalamine (Canasa)  1,000 mg AR HS PRN


   PRN Reason: Bleeding


Ondansetron HCl (Zofran Odt)  4 mg PO Q8H PRN


   PRN Reason: Nausea/Vomiting


Ondansetron HCl (Zofran)  4 mg IVP Q6H PRN


   PRN Reason: Nausea/Vomiting


   Last Admin: 02/10/18 10:32 Dose:  4 mg


Prenatal Multivit/Folic Acid/Iron (Prenatal Vitamin)  1 tab PO DAILY [facility]


   Last Admin: 02/13/18 10:41 Dose:  1 tab


Simethicone (Mylicon Chewable)  180 mg PO [facility]


   Last Admin: 02/13/18 12:38 Dose:  180 mg

## 2018-02-14 RX ADMIN — SIMETHICONE SCH MG: 80 TABLET, CHEWABLE ORAL at 18:04

## 2018-02-14 RX ADMIN — BRIMONIDINE TARTRATE SCH DROP: 2 SOLUTION OPHTHALMIC at 08:43

## 2018-02-14 RX ADMIN — SIMETHICONE SCH MG: 80 TABLET, CHEWABLE ORAL at 12:14

## 2018-02-14 RX ADMIN — BRIMONIDINE TARTRATE SCH DROP: 2 SOLUTION OPHTHALMIC at 14:52

## 2018-02-14 RX ADMIN — GUAIFENESIN AND DEXTROMETHORPHAN HYDROBROMIDE SCH TAB: 600; 30 TABLET, EXTENDED RELEASE ORAL at 08:40

## 2018-02-14 RX ADMIN — SIMETHICONE SCH MG: 80 TABLET, CHEWABLE ORAL at 20:19

## 2018-02-14 RX ADMIN — GUAIFENESIN AND DEXTROMETHORPHAN HYDROBROMIDE SCH TAB: 600; 30 TABLET, EXTENDED RELEASE ORAL at 20:20

## 2018-02-14 RX ADMIN — BRIMONIDINE TARTRATE SCH DROP: 2 SOLUTION OPHTHALMIC at 20:20

## 2018-02-14 RX ADMIN — SIMETHICONE SCH MG: 80 TABLET, CHEWABLE ORAL at 08:51

## 2018-02-14 NOTE — PDOC.PN
- Subjective


Encounter Start Date: 02/14/18


Encounter Start Time: 14:18


Subjective: feels much better.able to use her fingers and snap





- Objective


MAR Reviewed: Yes


Vital Signs & Weight: 


 Vital Signs (12 hours)











  Temp Pulse Resp BP Pulse Ox


 


 02/14/18 12:00  97.5 F L  98  18  139/82  95


 


 02/14/18 08:01  98.4 F  98  18   95


 


 02/14/18 07:46  98.4 F  98  18  144/93 H  95








 Weight











Weight                         334 lb 11.2 oz














I&O: 


 











 02/13/18 02/14/18 02/15/18





 06:59 06:59 06:59


 


Intake Total 260  


 


Output Total 6520 882 16


 


Balance -1320 -882 -16











Result Diagrams: 


 02/12/18 04:36





 02/12/18 04:36





Phys Exam





- Physical Examination


Constitutional: NAD


HEENT: PERRLA, moist MMs, sclera anicteric, oral pharynx no lesions


Neck: no nodes, no JVD, supple, full ROM


Respiratory: no wheezing, no rales, no rhonchi, clear to auscultation bilateral


Cardiovascular: RRR, no significant murmur


Gastrointestinal: soft, non-tender, no distention, positive bowel sounds


Musculoskeletal: no edema, pulses present


paraplegia


Psychiatric: normal affect, A&O x 3


Skin: no rash





Dx/Plan


(1) Pneumonia


Code(s): J18.9 - PNEUMONIA, UNSPECIFIED ORGANISM   Status: Acute   


Qualifiers: 


   Laterality: left   Lung location: lower lobe of lung 





(2) Sepsis


Code(s): A41.9 - SEPSIS, UNSPECIFIED ORGANISM   Status: Acute   





(3) HTN (hypertension)


Code(s): I10 - ESSENTIAL (PRIMARY) HYPERTENSION   Status: Chronic   





(4) Morbid obesity


Code(s): E66.01 - MORBID (SEVERE) OBESITY DUE TO EXCESS CALORIES   Status: 

Acute   





(5) Ulcerative colitis


Code(s): K51.90 - ULCERATIVE COLITIS, UNSPECIFIED, WITHOUT COMPLICATIONS   

Status: Acute   





(6) Multiple sclerosis, secondary progressive


Code(s): G35 - MULTIPLE SCLEROSIS   Status: Chronic   





(7) Colostomy in place


Code(s): Z93.3 - COLOSTOMY STATUS   Status: Acute   





- Plan


PT/OT, DVT proph w/SCDs


rehab eval.


-: po ABx.clinically better


-: DC when accepted.





* .








Review of Systems





- Review of Systems


Constitutional: weakness, malaise.  negative: fever, chills, sweats, other


ENT: negative: Ear Pain, Ear Discharge, Nose Pain, Nose Discharge, Nose 

Congestion, Mouth Pain, Mouth Swelling, Throat Pain, Throat Swelling, Other


Respiratory: negative: Cough, Dry, Shortness of Breath, Hemoptysis, SOB with 

Excertion, Pleuritic Pain, Sputum, Wheezing


Cardiovascular: negative: chest pain, palpitations, orthopnea, paroxysmal 

nocturnal dyspnea, edema, light headedness, other


Gastrointestinal: negative: Nausea, Vomiting, Abdominal Pain, Diarrhea, 

Constipation, Melena, Hematochezia, Other


Genitourinary: negative: Dysuria, Frequency, Incontinence, Hematuria, Retention

, Other


Musculoskeletal: negative: Neck Pain, Shoulder Pain, Arm Pain, Back Pain, Hand 

Pain, Leg Pain, Foot Pain, Other


Skin: negative: Rash, Lesions, Giovany, Bruising, Other


Neurological: Weakness.  negative: Numbness, Incoordination, Change in Speech, 

Confusion, Seizures, Other





- Medications/Allergies


Allergies/Adverse Reactions: 


 Allergies











Allergy/AdvReac Type Severity Reaction Status Date / Time


 


clarithromycin [From Biaxin] Allergy  Hives Verified 02/09/18 22:11











Medications: 


 Current Medications





Acetaminophen (Tylenol)  650 mg PO Q4H PRN


   PRN Reason: Headache/Fever or Pain


   Last Admin: 02/09/18 22:47 Dose:  650 mg


Acetaminophen (Tylenol)  650 mg [place] Q4H PRN


   PRN Reason: Headache/Fever or Pain


Hydrocodone Bitart/Acetaminophen (Norco 5/325)  1 tab PO Q6H PRN


   PRN Reason: Moderate Pain (4-6)


Hydrocodone Bitart/Acetaminophen (Norco 5/325)  2 tab PO Q6H PRN


   PRN Reason: Severe Pain (7-10)


Albuterol Sulfate (Proventil Hfa)  1 puff INH Q4H PRN


   PRN Reason: SOB &/or Wheezing


Albuterol/Ipratropium (Duoneb)  3 ml NEB [id number] PRN


   PRN Reason: SOB &/or Wheezing


   Last Admin: 02/10/18 22:32 Dose:  3 ml


Baclofen (Lioresal)  10 mg PO QID [facility]


   Last Admin: 02/14/18 12:14 Dose:  10 mg


Bisacodyl (Dulcolax)  10 mg PO DAILYPRN PRN


   PRN Reason: Constipation


Brimonidine Tartrate (Alphagan 0.2% [facility])  1 drop R EYE TID [facility]


   Last Admin: 02/14/18 08:43 Dose:  1 drop


Cholecalciferol (Vitamin D3)  2,000 units PO DAILY [facility]


   Last Admin: 02/14/18 08:41 Dose:  2,000 units


Cyclobenzaprine HCl (Flexeril)  10 mg PO TID [facility]


   Last Admin: 02/14/18 08:40 Dose:  10 mg


Docusate Sodium (Colace)  100 mg PO DAILY [facility]


   Last Admin: 02/14/18 08:41 Dose:  100 mg


Enoxaparin Sodium (Lovenox)  40 mg SC 0900 [facility]


   Last Admin: 02/14/18 08:42 Dose:  40 mg


Fluoxetine HCl (Prozac)  20 mg PO BID [facility]


   Last Admin: 02/14/18 08:41 Dose:  20 mg


Fluticasone Propionate (Flonase Nasal Spray)  1 gm NASAL DAILY [facility]


   Last Admin: 02/14/18 08:42 Dose:  1 spr


Gabapentin (Neurontin)  800 mg PO BID [facility]


   Last Admin: 02/14/18 08:41 Dose:  800 mg


Guaifenesin/Dextromethorphan (Robitussin Dm)  5 ml PO Q6HR PRN


   PRN Reason: Congestion


Guaifenesin/Dextromethorphan (Mucinex Dm)  2 tab PO Q12HR [facility]


   Last Admin: 02/14/18 08:40 Dose:  2 tab


Hyoscyamine Sulfate (Levsin)  0.25 mg PO ACHS [facility]


   Last Admin: 02/14/18 12:08 Dose:  0.25 mg


Ibuprofen (Motrin)  800 mg PO Q6H PRN


   PRN Reason: Fever>101/MILD Pain


   Last Admin: 02/12/18 21:33 Dose:  800 mg


Levofloxacin (Levaquin)  750 mg PO 2000 [facility]


   Last Admin: 02/13/18 20:34 Dose:  750 mg


Melatonin (Melatonin)  3 mg PO HS [facility]


   Last Admin: 02/13/18 22:01 Dose:  3 mg


Mesalamine (Canasa)  1,000 mg GA HS PRN


   PRN Reason: Bleeding


Ondansetron HCl (Zofran Odt)  4 mg PO Q8H PRN


   PRN Reason: Nausea/Vomiting


Ondansetron HCl (Zofran)  4 mg IVP Q6H PRN


   PRN Reason: Nausea/Vomiting


   Last Admin: 02/10/18 10:32 Dose:  4 mg


Prenatal Multivit/Folic Acid/Iron (Prenatal Vitamin)  1 tab PO DAILY [facility]


   Last Admin: 02/14/18 08:41 Dose:  1 tab


Simethicone (Mylicon Chewable)  180 mg PO [facility]


   Last Admin: 02/14/18 12:14 Dose:  180 mg

## 2018-02-15 RX ADMIN — BRIMONIDINE TARTRATE SCH DROP: 2 SOLUTION OPHTHALMIC at 15:10

## 2018-02-15 RX ADMIN — BRIMONIDINE TARTRATE SCH DROP: 2 SOLUTION OPHTHALMIC at 21:09

## 2018-02-15 RX ADMIN — GUAIFENESIN AND DEXTROMETHORPHAN HYDROBROMIDE SCH TAB: 600; 30 TABLET, EXTENDED RELEASE ORAL at 21:07

## 2018-02-15 RX ADMIN — SIMETHICONE SCH MG: 80 TABLET, CHEWABLE ORAL at 21:13

## 2018-02-15 RX ADMIN — GUAIFENESIN AND DEXTROMETHORPHAN HYDROBROMIDE SCH: 600; 30 TABLET, EXTENDED RELEASE ORAL at 21:09

## 2018-02-15 RX ADMIN — SIMETHICONE SCH MG: 80 TABLET, CHEWABLE ORAL at 18:00

## 2018-02-15 RX ADMIN — GUAIFENESIN AND DEXTROMETHORPHAN HYDROBROMIDE SCH TAB: 600; 30 TABLET, EXTENDED RELEASE ORAL at 08:24

## 2018-02-15 RX ADMIN — SIMETHICONE SCH MG: 80 TABLET, CHEWABLE ORAL at 12:07

## 2018-02-15 RX ADMIN — SIMETHICONE SCH MG: 80 TABLET, CHEWABLE ORAL at 08:27

## 2018-02-15 RX ADMIN — BRIMONIDINE TARTRATE SCH DROP: 2 SOLUTION OPHTHALMIC at 08:25

## 2018-02-15 NOTE — PDOC.PN
- Subjective


Encounter Start Date: 02/15/18


Encounter Start Time: 15:04


Subjective: feels better.no new complaints.


-: discussed home situation





- Objective


MAR Reviewed: Yes


Vital Signs & Weight: 


 Vital Signs (12 hours)











  Temp Pulse Resp BP Pulse Ox


 


 02/15/18 12:00  98.3 F  107 H  22 H  127/83  96


 


 02/15/18 07:51  98.8 F  107 H  22 H  


 


 02/15/18 07:23  98.8 F  107 H  22 H  120/78  97


 


 02/15/18 05:59  98.2 F  111 H  20  121/79  97








 Weight











Weight                         334 lb 11.2 oz














I&O: 


 











 02/14/18 02/15/18 02/16/18





 06:59 06:59 06:59


 


Intake Total  1800 


 


Output Total 882 1507 16


 


Balance -882 293 -16











Result Diagrams: 


 02/12/18 04:36





 02/12/18 04:36


Additional Labs: 





Microbiology





02/09/18 16:22   Nasal swab   Influenza Types A,B Direct EIA - Final


02/09/18 16:02   Venous blood - Right Hand   Blood Culture - Final


                              NO GROWTH IN 5 DAYS


02/09/18 15:38   Venous blood - Left Arm   Blood Culture - Final


                              NO GROWTH IN 5 DAYS











Phys Exam





- Physical Examination


Constitutional: NAD


HEENT: PERRLA, moist MMs, sclera anicteric, oral pharynx no lesions


Neck: no nodes, no JVD, supple, full ROM


Respiratory: no wheezing, no rales, no rhonchi, clear to auscultation bilateral


Cardiovascular: RRR, no significant murmur


Gastrointestinal: soft, non-tender, no distention, positive bowel sounds


Musculoskeletal: pulses present, edema present (chr lymphedema w/o excoriation)


LE paraplegia 


Psychiatric: A&O x 3


tearful


Skin: no rash





Dx/Plan


(1) Pneumonia


Code(s): J18.9 - PNEUMONIA, UNSPECIFIED ORGANISM   Status: Acute   


Qualifiers: 


   Laterality: left   Lung location: lower lobe of lung 





(2) HTN (hypertension)


Code(s): I10 - ESSENTIAL (PRIMARY) HYPERTENSION   Status: Chronic   





(3) Morbid obesity


Code(s): E66.01 - MORBID (SEVERE) OBESITY DUE TO EXCESS CALORIES   Status: 

Acute   





(4) Ulcerative colitis


Code(s): K51.90 - ULCERATIVE COLITIS, UNSPECIFIED, WITHOUT COMPLICATIONS   

Status: Acute   





(5) Multiple sclerosis, secondary progressive


Code(s): G35 - MULTIPLE SCLEROSIS   Status: Chronic   





(6) Colostomy in place


Code(s): Z93.3 - COLOSTOMY STATUS   Status: Acute   





(7) Sepsis


Code(s): A41.9 - SEPSIS, UNSPECIFIED ORGANISM   Status: Suspected   





- Plan


continue antibiotics, PT/OT, incentive spirometry, DVT proph w/SCDs


Rehab eval in progress.


-: consult PCT for emotional support & Home situation


-: cont empiric ABx.


-: mark [facility] to [facility] today Or tomorrow.





* .








Review of Systems





- Review of Systems


Constitutional: negative: fever, chills, sweats, weakness, malaise, other


Eyes: negative: Pain, Vision Change, Conjunctivae Inflammation, Eyelid 

Inflammation, Redness, Other


ENT: negative: Ear Pain, Ear Discharge, Nose Pain, Nose Discharge, Nose 

Congestion, Mouth Pain, Mouth Swelling, Throat Pain, Throat Swelling, Other


Respiratory: negative: Cough, Dry, Shortness of Breath, Hemoptysis, SOB with 

Excertion, Pleuritic Pain, Sputum, Wheezing


Cardiovascular: negative: chest pain, palpitations, orthopnea, paroxysmal 

nocturnal dyspnea, edema, light headedness, other


Gastrointestinal: negative: Nausea, Vomiting, Abdominal Pain, Diarrhea, 

Constipation, Melena, Hematochezia, Other


Genitourinary: negative: Dysuria, Frequency, Incontinence, Hematuria, Retention

, Other


Musculoskeletal: negative: Neck Pain, Shoulder Pain, Arm Pain, Back Pain, Hand 

Pain, Leg Pain, Foot Pain, Other


Skin: negative: Rash, Lesions, Giovany, Bruising, Other


Neurological: Weakness (paraplegia LE)





- Medications/Allergies


Allergies/Adverse Reactions: 


 Allergies











Allergy/AdvReac Type Severity Reaction Status Date / Time


 


clarithromycin [From Biaxin] Allergy  Hives Verified 02/09/18 22:11











Medications: 


 Current Medications





Acetaminophen (Tylenol)  650 mg PO Q4H PRN


   PRN Reason: Headache/Fever or Pain


   Last Admin: 02/09/18 22:47 Dose:  650 mg


Acetaminophen (Tylenol)  650 mg [place] Q4H PRN


   PRN Reason: Headache/Fever or Pain


Hydrocodone Bitart/Acetaminophen (Norco 5/325)  1 tab PO Q6H PRN


   PRN Reason: Moderate Pain (4-6)


Hydrocodone Bitart/Acetaminophen (Norco 5/325)  2 tab PO Q6H PRN


   PRN Reason: Severe Pain (7-10)


Albuterol Sulfate (Proventil Hfa)  1 puff INH Q4H PRN


   PRN Reason: SOB &/or Wheezing


Albuterol/Ipratropium (Duoneb)  3 ml NEB [id number] PRN


   PRN Reason: SOB &/or Wheezing


   Last Admin: 02/10/18 22:32 Dose:  3 ml


Baclofen (Lioresal)  10 mg PO QID [facility]


   Last Admin: 02/15/18 12:06 Dose:  10 mg


Bisacodyl (Dulcolax)  10 mg PO DAILYPRN PRN


   PRN Reason: Constipation


Brimonidine Tartrate (Alphagan 0.2% [facility])  1 drop R EYE TID [facility]


   Last Admin: 02/15/18 08:25 Dose:  1 drop


Cholecalciferol (Vitamin D3)  2,000 units PO DAILY [facility]


   Last Admin: 02/15/18 09:49 Dose:  2,000 units


Cyclobenzaprine HCl (Flexeril)  10 mg PO TID [facility]


   Last Admin: 02/15/18 08:25 Dose:  10 mg


Docusate Sodium (Colace)  100 mg PO DAILY [facility]


   Last Admin: 02/15/18 08:25 Dose:  100 mg


Enoxaparin Sodium (Lovenox)  40 mg SC 0900 [facility]


   Last Admin: 02/15/18 08:26 Dose:  40 mg


Fluoxetine HCl (Prozac)  20 mg PO BID [facility]


   Last Admin: 02/15/18 08:25 Dose:  20 mg


Fluticasone Propionate (Flonase Nasal Spray)  1 gm NASAL DAILY [facility]


   Last Admin: 02/15/18 08:26 Dose:  1 spr


Gabapentin (Neurontin)  800 mg PO BID [facility]


   Last Admin: 02/15/18 08:25 Dose:  800 mg


Guaifenesin/Dextromethorphan (Robitussin Dm)  5 ml PO Q6HR PRN


   PRN Reason: Congestion


Guaifenesin/Dextromethorphan (Mucinex Dm)  2 tab PO Q12HR [facility]


   Last Admin: 02/15/18 08:24 Dose:  2 tab


Hyoscyamine Sulfate (Levsin)  0.25 mg PO ACHS [facility]


   Last Admin: 02/15/18 12:06 Dose:  0.25 mg


Ibuprofen (Motrin)  800 mg PO Q6H PRN


   PRN Reason: Fever>101/MILD Pain


   Last Admin: 02/12/18 21:33 Dose:  800 mg


Levofloxacin (Levaquin)  750 mg PO 2000 [facility]


   Last Admin: 02/14/18 20:20 Dose:  750 mg


Melatonin (Melatonin)  3 mg PO HS [facility]


   Last Admin: 02/14/18 20:20 Dose:  3 mg


Mesalamine (Canasa)  1,000 mg [place] HS PRN


   PRN Reason: Bleeding


Ondansetron HCl (Zofran Odt)  4 mg PO Q8H PRN


   PRN Reason: Nausea/Vomiting


Ondansetron HCl (Zofran)  4 mg IVP Q6H PRN


   PRN Reason: Nausea/Vomiting


   Last Admin: 02/10/18 10:32 Dose:  4 mg


Prenatal Multivit/Folic Acid/Iron (Prenatal Vitamin)  1 tab PO DAILY [facility]


   Last Admin: 02/15/18 08:25 Dose:  1 tab


Simethicone (Mylicon Chewable)  180 mg PO [facility]


   Last Admin: 02/15/18 12:07 Dose:  180 mg

## 2018-02-16 RX ADMIN — SIMETHICONE SCH MG: 80 TABLET, CHEWABLE ORAL at 08:20

## 2018-02-16 RX ADMIN — GUAIFENESIN AND DEXTROMETHORPHAN HYDROBROMIDE SCH: 600; 30 TABLET, EXTENDED RELEASE ORAL at 08:22

## 2018-02-16 RX ADMIN — BRIMONIDINE TARTRATE SCH DROP: 2 SOLUTION OPHTHALMIC at 16:15

## 2018-02-16 RX ADMIN — GUAIFENESIN AND DEXTROMETHORPHAN HYDROBROMIDE SCH TAB: 600; 30 TABLET, EXTENDED RELEASE ORAL at 20:37

## 2018-02-16 RX ADMIN — SIMETHICONE SCH MG: 80 TABLET, CHEWABLE ORAL at 20:32

## 2018-02-16 RX ADMIN — SIMETHICONE SCH MG: 80 TABLET, CHEWABLE ORAL at 12:07

## 2018-02-16 RX ADMIN — SIMETHICONE SCH MG: 80 TABLET, CHEWABLE ORAL at 18:05

## 2018-02-16 RX ADMIN — BRIMONIDINE TARTRATE SCH DROP: 2 SOLUTION OPHTHALMIC at 20:31

## 2018-02-16 RX ADMIN — BRIMONIDINE TARTRATE SCH DROP: 2 SOLUTION OPHTHALMIC at 08:21

## 2018-02-16 NOTE — PDOC.PN
- Subjective


Encounter Start Date: 02/16/18


Encounter Start Time: 13:20





Ms. Flores was seen in follow-up. She says she believes her cough has returned 

some, and feels a little more short of breath.





- Objective


MAR Reviewed: Yes


Vital Signs & Weight: 


 Vital Signs (12 hours)











  Temp Pulse Resp BP Pulse Ox


 


 02/16/18 08:00  98.5 F  104 H  20   94 L


 


 02/16/18 07:44  98.5 F  104 H  20  162/90 H  94 L


 


 02/16/18 04:00  98.2 F  104 H  18  146/93 H  96








 Weight











Weight                         334 lb 11.2 oz














I&O: 


 











 02/15/18 02/16/18 02/17/18





 06:59 06:59 06:59


 


Intake Total 1800 1200 480


 


Output Total 1507 1982 


 


Balance 293 -782 480











Result Diagrams: 


 02/12/18 04:36





 02/12/18 04:36





Phys Exam





- Physical Examination


HEENT: PERRLA


+ rales at the bases


Cardiovascular: RRR, no significant murmur, no rub


Gastrointestinal: soft, non-tender, positive bowel sounds


Musculoskeletal: edema present


trace pedal edema, and muscle atrophy





Dx/Plan


(1) Pneumonia


Code(s): J18.9 - PNEUMONIA, UNSPECIFIED ORGANISM   Status: Acute   


Qualifiers: 


   Laterality: left   Lung location: lower lobe of lung 





(2) HTN (hypertension)


Code(s): I10 - ESSENTIAL (PRIMARY) HYPERTENSION   Status: Chronic   





(3) Morbid obesity


Code(s): E66.01 - MORBID (SEVERE) OBESITY DUE TO EXCESS CALORIES   Status: 

Acute   





(4) Ulcerative colitis


Code(s): K51.90 - ULCERATIVE COLITIS, UNSPECIFIED, WITHOUT COMPLICATIONS   

Status: Acute   





(5) Multiple sclerosis, secondary progressive


Code(s): G35 - MULTIPLE SCLEROSIS   Status: Chronic   





- Plan





* Pneumonia- will re-check CXR in the AM


* Incentive Spirometry to help with improving lung expansion


* MS- she is not currently on any specific treatment for this


* Continue PT/OT.


* Awaiting Rehab transfer

## 2018-02-17 VITALS — DIASTOLIC BLOOD PRESSURE: 83 MMHG | TEMPERATURE: 98.6 F | SYSTOLIC BLOOD PRESSURE: 131 MMHG

## 2018-02-17 RX ADMIN — GUAIFENESIN AND DEXTROMETHORPHAN HYDROBROMIDE SCH TAB: 600; 30 TABLET, EXTENDED RELEASE ORAL at 08:29

## 2018-02-17 RX ADMIN — SIMETHICONE SCH MG: 80 TABLET, CHEWABLE ORAL at 08:27

## 2018-02-17 RX ADMIN — SIMETHICONE SCH MG: 80 TABLET, CHEWABLE ORAL at 11:49

## 2018-02-17 RX ADMIN — BRIMONIDINE TARTRATE SCH DROP: 2 SOLUTION OPHTHALMIC at 16:22

## 2018-02-17 RX ADMIN — BRIMONIDINE TARTRATE SCH DROP: 2 SOLUTION OPHTHALMIC at 08:31

## 2018-02-17 RX ADMIN — SIMETHICONE SCH MG: 80 TABLET, CHEWABLE ORAL at 18:18

## 2018-02-17 NOTE — RAD
CHEST 1 VIEW:

 

HISTORY: 

Pneumonia.  Followup.

 

COMPARISON: 

2/9/18.

 

FINDINGS: 

Cardiac silhouette is magnified by projection.  Pulmonary vasculature is unremarkable.  Mediastinum i
s midline.  Left pleural fluid and basilar infiltrate is unchanged from the previous exam.  No eviden
ce of pneumothorax.

 

IMPRESSION: 

Left basilar infiltrate and pleural fluid are stable.  No new abnormalities are demonstrated.

 

POS: [facility]

## 2018-02-17 NOTE — PDOC.PN
- Subjective


Encounter Start Date: 02/17/18


Encounter Start Time: 11:32





Ms. Flores was seen today in follow-up. She is feeling a bit better. Coughing 

is less. She is using the Incentive Spirometry, and is able to get it up to 

about 1200ml. 





- Objective


MAR Reviewed: Yes


Vital Signs & Weight: 


 Vital Signs (12 hours)











  Temp Pulse Resp BP Pulse Ox


 


 02/17/18 08:35  98.6 F  106 H  20  97/60  94 L








 Weight











Weight                         334 lb 11.2 oz














I&O: 


 











 02/16/18 02/17/18 02/18/18





 06:59 06:59 06:59


 


Intake Total 1200 480 240


 


Output Total 1982 350 


 


Balance -782 130 240











Result Diagrams: 


 02/12/18 04:36





 02/12/18 04:36





Phys Exam





- Physical Examination


HEENT: PERRLA


Respiratory: no wheezing, no rales, no rhonchi, clear to auscultation bilateral


Cardiovascular: RRR, no significant murmur


Gastrointestinal: soft, non-tender, positive bowel sounds


Musculoskeletal: no edema





Dx/Plan


(1) Pneumonia


Code(s): J18.9 - PNEUMONIA, UNSPECIFIED ORGANISM   Status: Acute   


Qualifiers: 


   Laterality: left   Lung location: lower lobe of lung 





(2) HTN (hypertension)


Code(s): I10 - ESSENTIAL (PRIMARY) HYPERTENSION   Status: Chronic   





(3) Morbid obesity


Code(s): E66.01 - MORBID (SEVERE) OBESITY DUE TO EXCESS CALORIES   Status: 

Acute   





(4) Ulcerative colitis


Code(s): K51.90 - ULCERATIVE COLITIS, UNSPECIFIED, WITHOUT COMPLICATIONS   

Status: Acute   





(5) Multiple sclerosis, secondary progressive


Code(s): G35 - MULTIPLE SCLEROSIS   Status: Chronic   





- Plan





* Pneumonia- Improving clinically- continue Levaquin


* MS- continue PT/OT. She has noted some improvement in her dexterity in her 

hands with PT and OT. She also has been able to sit up at the side of the bed 

with assistance, and is looking forward to being able to do this on her own, 

and be more independent at home.


* HTN- blood pressure is stable


* Tachycardia- this may be due to deconditioning- will. Chalino check TFT's in the 

AM


.

## 2018-02-17 NOTE — DIS
PRIMARY CARE PHYSICIAN:  Dr. Mares.

 

DATE OF ADMISSION:  02/09/2018

 

DATE OF DISCHARGE:  02/17/2018

 

DISCHARGE DISPOSITION:  Inpatient rehabilitation at [facility].

 

DISCHARGE DIAGNOSES:

1.  Community-acquired pneumonia.

2.  Multiple sclerosis.

3.  Severe deconditioning.

4.  Hypertension.

5.  Raynaud's syndrome.

6.  Depression.

7.  History of ulcerative colitis.

8.  Obesity.

 

DISCHARGE MEDICATIONS:  Include Levaquin 750 mg p.o. q. day for 3 days, Travatan Z one drop in the ri
ght eye at bedtime, simethicone p.r.n., Zofran 4 mg q.8 hours as needed, mesalamine 1000 mg per rectu
m as needed, melatonin 3 mg at bedtime, ibuprofen 800 mg q. 6 hours as needed, hyoscyamine sulfate 0.
125 mg 2 tabs q.i.d., Norco 5/325 one to two tabs q.6 hours as needed, gabapentin 800 mg twice a day,
 Prozac 20 mg twice daily, Colace 100 mg daily, Flexeril 10 mg t.i.d., cranberry extract 500 mg t.i.d
., vitamin D3 of 1000 units daily, Alphagan 0.2 ophthalmic 1 drop in the right eye t.i.d. and baclofe
n 10 mg q.i.d.

 

CODE STATUS:  FULL CODE.

 

ALLERGIES:  CLARITHROMYCIN.

 

HOSPITAL COURSE:  Ms. Flores is a 57-year-old female who presented to the emergency room with product
rogers cough and fever and also noted some burning in her chest.  She was diagnosed with community-acqui
red pneumonia.  She had an elevated white blood cell count and with a fever qualified for admission i
[facility].  She was treated with IV antibiotics and improved over the course of the next few d
ays.  She also had mentioned that she has been very deconditioned due to both the pneumonia as well a
s progressive multiple sclerosis and her goal was to be more mobile and more independent at home and 
for this reason she was screened for inpatient rehabilitation and was accepted and able to be transfe
rred there on 02/17/2018.

## 2018-02-23 NOTE — PQF
ALYSSA MANCIA TONI MD

[id number]                                                             [id number]
[id number]

[id number]                             

                                   

CLINICAL DOCUMENTATION CLARIFICATION FORM:  POST DISCHARGE



Addendum to original discharge summary date:  2/17/2018





ALYSSA MANCIA             [id number]                  [id number]



GABRIELE AMEZCUA



                     PLEASE DOCUMENT YOUR RESPONSE BELOW

                                             



               YOUR INPUT IS NEEDED TO CORRECTLY CODE A DIAGNOSIS FOR YOUR 
PATIENT.







DATE:   2/23/2018                                                ATTN:   Dr. Castillo





Please exercise your independent, professional judgment in responding to the 
clarification form. 

Clinical indicators are provided on the bottom of this form for your review





Please check appropriate box(s) to clarify if the following diagnosis has been 
ruled in our ruled out:





Please clarify if the diagnosis of sepsis was:  





[ X ] Sepsis was a Ruled in diagnosis

     [  ] Continue to treat      

     [  ] Resolved



[  ] Sepsis was a Ruled out diagnosis



[  ] Cannot rule out diagnosis



[  ] Other diagnosis (please specify)  



[  ] Unable to determine



In addition, please specify:

Present on Admission (POA):  [ X ] Yes             [  ] No             [  ] 
Unable to determine



For continuity of documentation, please document condition throughout progress 
notes and discharge summary.  Thank You.





SIGNS / SYMPTOMS / LABS

Per H&P:  Sinus tachycardia (unclear if from sepsis or past history of chronic 
sinus tachycardia).  

Per ED:  Temp max:  101 to 101.9.  .  

Per H&P:  Sepsis most likely secondary to pneumonia.  

Progress notes 2/10 to 12/14:  Sepsis.

Progress note 2/15:  Sepsis, suspected.  

Progress notes 2/16, 2/17 and discharge summary:  No mention of sepsis.  



RISK FACTORS (per H&P)

Pneumonia.  





TREATMENTS (per progress notes)

IV Levofloxacin.   

IV Ceftriaxone.  







(This form is maintained as a part of the permanent medical record)

2015 [employer].  All Rights Reserved

[email or web address]    [phone]

                                                              



MTDTONY

## 2018-04-19 ENCOUNTER — HOSPITAL ENCOUNTER (OUTPATIENT)
Dept: HOSPITAL 92 - WCC | Age: 58
Discharge: HOME | End: 2018-04-19
Attending: FAMILY MEDICINE
Payer: MEDICARE

## 2018-04-19 DIAGNOSIS — G35: ICD-10-CM

## 2018-04-19 DIAGNOSIS — Z87.898: ICD-10-CM

## 2018-04-19 DIAGNOSIS — I73.00: ICD-10-CM

## 2018-04-19 DIAGNOSIS — I89.0: Primary | ICD-10-CM

## 2018-04-19 DIAGNOSIS — K50.90: ICD-10-CM

## 2018-04-19 PROCEDURE — 97139 UNLISTED THERAPEUTIC PX: CPT

## 2018-04-19 PROCEDURE — 99203 OFFICE O/P NEW LOW 30 MIN: CPT

## 2018-04-19 PROCEDURE — G0463 HOSPITAL OUTPT CLINIC VISIT: HCPCS

## 2018-04-20 NOTE — HP
DATE OF SERVICE:  04/19/2018

 

HISTORY OF PRESENT ILLNESS:  Ms. Kathy Flores is a very pleasant 57-year-old who [facility] the
 [facility] for evaluation of lymphedema of the lower extremities and trunk.  The patient was refer
red to the Wound Center by Dr. Kam Hines.  The patient's primary care provider is Dr. Renee aleman.  Ms. Kathy Flores has no other complaints today.  She denies any fever or chills.

 

PAST MEDICAL HISTORY:

1.  Multiple sclerosis.

2.  Crohn disease.

3.  Lymphedema.

4.  Raynaud syndrome.

5.  History of ulcerative colitis.

 

PAST SURGICAL HISTORY:

1.  Tubal ligation.

2.  Ganglion cyst removal from the left wrist.

3.  Laparoscopic ileostomy in 10/2014.

4.  Colectomy and ostomy placement.

 

MEDICATIONS:  Patient does not have a list of her medications with her today.  She states that her me
dications, however, include Flexeril, baclofen, Prozac, gabapentin, melatonin, vitamin D, multivitami
n, and probiotic.

 

ALLERGIES:  BIAXIN.

 

SOCIAL HISTORY:  Significant for tobacco use of 1/2 of a pack of cigarettes per day for 16 years.  Th
e patient states that she stopped smoking 30 years ago.  The patient denies any history of EtOH use.

 

FAMILY HISTORY:  Negative for diabetes mellitus or coronary artery disease.

 

PHYSICAL EXAMINATION:

VITAL SIGNS:  Temperature 98.2, pulse 113, respirations 20, blood pressure 140/83.

GENERAL:  A 57-year-old sitting on wheelchair in examination room, in no acute distress.

HEENT:  Normocephalic, atraumatic.

NECK:  No nuchal rigidity.

CHEST:  Clear to auscultation.

CARDIAC:  Regular rate and rhythm.

ABDOMEN:  Soft.

EXTREMITIES:  Lymphedema of the lower extremities is present, affecting also the trunk.  Circumferenc
es of the right lower extremity at the ankle, calf, knee, and thigh are 29 cm, 45 cm, 43 cm, and 63 c
m.  Circumferences of the left lower extremity at the ankle, calf, knee, and thigh are 29 cm, 40 cm, 
46 cm, and 64 cm.  The abdominal circumference is 170 cm.  Hyperpigmentation and hyperkeratosis are p
resent over the right and left lower extremities.

NEUROLOGIC:  Grossly nonfocal.

 

ASSESSMENT AND PLAN:

1.  Lymphedema tarda, stage III.  The patient is presently using a pneumatic pump specifically Bio Co
mpression  at a pressure setting of 60.  I will see Ms. Flores again in 4 weeks, at this time, t
he circumferences of the right and left lower extremities and of the abdomen will be obtained again. 
 The patient has been instructed in self MLD with _____.  The patient has also been instructed to fol
low a low sodium diet.

2.  Multiple sclerosis.

3.  Crohn disease.

4.  Raynaud syndrome.

5.  History of ulcerative colitis.

## 2018-05-17 ENCOUNTER — HOSPITAL ENCOUNTER (OUTPATIENT)
Dept: HOSPITAL 92 - WCC | Age: 58
Discharge: HOME | End: 2018-05-17
Attending: FAMILY MEDICINE
Payer: MEDICARE

## 2018-05-17 DIAGNOSIS — K50.90: ICD-10-CM

## 2018-05-17 DIAGNOSIS — I73.00: ICD-10-CM

## 2018-05-17 DIAGNOSIS — I80.9: Primary | ICD-10-CM

## 2018-05-17 DIAGNOSIS — G35: ICD-10-CM

## 2018-05-17 DIAGNOSIS — Z87.19: ICD-10-CM

## 2018-05-17 PROCEDURE — G0463 HOSPITAL OUTPT CLINIC VISIT: HCPCS

## 2018-05-17 PROCEDURE — 99213 OFFICE O/P EST LOW 20 MIN: CPT

## 2018-05-17 PROCEDURE — 97139 UNLISTED THERAPEUTIC PX: CPT

## 2018-05-17 NOTE — PRG
DATE OF SERVICE:  05/17/2018

 

HISTORY OF PRESENT ILLNESS:  Ms. Kathy Flores is a very pleasant 57-year-old who presents to the
 [facility] for evaluation of lymphedema of the lower extremities and trunk.  The patient was refer
red to the Wound Center by Lesly Hines.  The patient's primary care physician is Dr. Mares. 
 Ms. Kathy Flores has no complaints today.  She denies any fever or chills.

 

PHYSICAL EXAMINATION:

VITAL SIGNS:  Temperature 98.4, respirations 19, blood pressure 134/88.

EXTREMITIES:  Lymphedema of the right lower extremities is present affecting also the trunk.  Circumf
erences of the right lower extremity at the ankle, calf, and knee are 29.4 cm, 39.7 cm, and 54.5 cm. 
 Circumferences of the left lower extremity at the ankle, calf, and knee are 29.5 cm, 40.5 cm, and 57
.8 cm.  Hyperpigmentation and keratosis are present over the right and left lower extremities.

 

ASSESSMENT AND PLAN:

1.  Lymphedema tarda, stage 3.  The patient is currently using a pneumatic pump specifically bio comp
ression  at a pressure setting of 60.  The patient has been using her pneumatic pump on a daily 
basis.  She has also been performing self manual lymph drainage in conjunction with the use of her pn
eumatic pump.  The patient was previously placed on a low sodium diet.  Ms. Flores will be discharged
 from clinic today with followup on a p.r.n. basis.

2.  Multiple sclerosis.

3.  Crohn's disease.

4.  Raynaud's syndrome.

5.  History of ulcerative colitis.

## 2018-05-25 ENCOUNTER — HOSPITAL ENCOUNTER (EMERGENCY)
Dept: HOSPITAL 92 - ERS | Age: 58
Discharge: HOME | End: 2018-05-25
Payer: MEDICARE

## 2018-05-25 DIAGNOSIS — Z87.891: ICD-10-CM

## 2018-05-25 DIAGNOSIS — N30.01: Primary | ICD-10-CM

## 2018-05-25 DIAGNOSIS — G35: ICD-10-CM

## 2018-05-25 DIAGNOSIS — Z79.899: ICD-10-CM

## 2018-05-25 DIAGNOSIS — F32.9: ICD-10-CM

## 2018-05-25 DIAGNOSIS — E66.9: ICD-10-CM

## 2018-05-25 LAB
ALBUMIN SERPL BCG-MCNC: 3.5 G/DL (ref 3.5–5)
ALP SERPL-CCNC: 123 U/L (ref 40–150)
ALT SERPL W P-5'-P-CCNC: 29 U/L (ref 8–55)
ANION GAP SERPL CALC-SCNC: 14 MMOL/L (ref 10–20)
AST SERPL-CCNC: 27 U/L (ref 5–34)
BACTERIA UR QL AUTO: (no result) HPF
BASOPHILS # BLD AUTO: 0.1 THOU/UL (ref 0–0.2)
BASOPHILS NFR BLD AUTO: 1 % (ref 0–1)
BILIRUB SERPL-MCNC: 0.4 MG/DL (ref 0.2–1.2)
BUN SERPL-MCNC: 4 MG/DL (ref 9.8–20.1)
CALCIUM SERPL-MCNC: 9.3 MG/DL (ref 7.8–10.44)
CHLORIDE SERPL-SCNC: 103 MMOL/L (ref 98–107)
CK MB SERPL-MCNC: 0.4 NG/ML (ref 0–6.6)
CK SERPL-CCNC: 12 U/L (ref 29–168)
CO2 SERPL-SCNC: 23 MMOL/L (ref 22–29)
CREAT CL PREDICTED SERPL C-G-VRATE: 0 ML/MIN (ref 70–130)
CRYSTAL-AUWI FLAG: 2.8 (ref 0–15)
CRYSTALS URNS QL MICRO: (no result) HPF
EOSINOPHIL # BLD AUTO: 0.6 THOU/UL (ref 0–0.7)
EOSINOPHIL NFR BLD AUTO: 5.7 % (ref 0–10)
GLOBULIN SER CALC-MCNC: 4.1 G/DL (ref 2.4–3.5)
GLUCOSE SERPL-MCNC: 115 MG/DL (ref 70–105)
HEV IGM SER QL: 0.9 (ref 0–7.99)
HGB BLD-MCNC: 15.9 G/DL (ref 12–16)
HYALINE CASTS #/AREA URNS LPF: (no result) LPF
LYMPHOCYTES # BLD: 3.2 THOU/UL (ref 1.2–3.4)
LYMPHOCYTES NFR BLD AUTO: 32.4 % (ref 21–51)
MCH RBC QN AUTO: 29.8 PG (ref 27–31)
MCV RBC AUTO: 89.7 FL (ref 81–99)
MONOCYTES # BLD AUTO: 0.7 THOU/UL (ref 0.11–0.59)
MONOCYTES NFR BLD AUTO: 7 % (ref 0–10)
NEUTROPHILS # BLD AUTO: 5.3 THOU/UL (ref 1.4–6.5)
NEUTROPHILS NFR BLD AUTO: 54 % (ref 42–75)
PATHC CAST-AUWI FLAG: 1.43 (ref 0–2.49)
PLATELET # BLD AUTO: 379 THOU/UL (ref 130–400)
POTASSIUM SERPL-SCNC: 3.9 MMOL/L (ref 3.5–5.1)
PROT UR STRIP.AUTO-MCNC: 30 MG/DL
RBC # BLD AUTO: 5.33 MILL/UL (ref 4.2–5.4)
RBC UR QL AUTO: (no result) HPF (ref 0–3)
SODIUM SERPL-SCNC: 136 MMOL/L (ref 136–145)
SP GR UR STRIP: 1.01 (ref 1–1.04)
SPERM-AUWI FLAG: 0 (ref 0–9.9)
TROPONIN I SERPL DL<=0.01 NG/ML-MCNC: (no result) NG/ML (ref ?–0.03)
WBC # BLD AUTO: 9.7 THOU/UL (ref 4.8–10.8)
YEAST-AUWI FLAG: 486.6 (ref 0–25)

## 2018-05-25 PROCEDURE — 96365 THER/PROPH/DIAG IV INF INIT: CPT

## 2018-05-25 PROCEDURE — 80053 COMPREHEN METABOLIC PANEL: CPT

## 2018-05-25 PROCEDURE — 81015 MICROSCOPIC EXAM OF URINE: CPT

## 2018-05-25 PROCEDURE — 96375 TX/PRO/DX INJ NEW DRUG ADDON: CPT

## 2018-05-25 PROCEDURE — 84484 ASSAY OF TROPONIN QUANT: CPT

## 2018-05-25 PROCEDURE — 81003 URINALYSIS AUTO W/O SCOPE: CPT

## 2018-05-25 PROCEDURE — 74176 CT ABD & PELVIS W/O CONTRAST: CPT

## 2018-05-25 PROCEDURE — 94640 AIRWAY INHALATION TREATMENT: CPT

## 2018-05-25 PROCEDURE — 85025 COMPLETE CBC W/AUTO DIFF WBC: CPT

## 2018-05-25 PROCEDURE — 71045 X-RAY EXAM CHEST 1 VIEW: CPT

## 2018-05-25 PROCEDURE — 93005 ELECTROCARDIOGRAM TRACING: CPT

## 2018-05-25 PROCEDURE — 82553 CREATINE MB FRACTION: CPT

## 2018-05-25 NOTE — CT
CT ABDOMEN AND PELVIS NONCONTRAST:

 

INDICATIONS:

Right flank pain.

 

COMPARISON:

CT from 08/08/2011.

 

FINDINGS:

Scattered patchy densities of the imaged lung bases are incompletely evaluated and may relate to volu
me loss and/or scarring.  No discrete urolithiasis or obstructive uropathy.  A left lower quadrant os
zheng is present.  There is scattered vascular disease.  Vague hypodensity at the superior aspect of t
he right hepatic lobe is incompletely assessed on the basis of the noncontrast imaging.  No free air 
or ascites.  There is scattered osseous degenerative change.  There are areas of endplate irregularit
y and associated height loss of the imaged lower thoracic and lumbar spine.  Otherwise, no significan
t interval change.

 

IMPRESSION:

No urolithiasis or obstructive uropathy.  Additional details are as described above, within the limit
ations of a noncontrast technique.

 

POS: KIRA

## 2018-05-25 NOTE — RAD
CHEST ONE VIEW:

 

HISTORY:

Abdominal pain and shortness of breath x2 weeks.  Hematuria.

 

COMPARISON:

02/17/2018

 

FINDINGS:

Stable cardiac silhouette.  The pulmonary vessels are within normal limits.  Chronic blunting of the 
left costophrenic angle.  Patchy opacities of the lung parenchyma.  No consolidation or mass.  No pne
umothorax or osseous abnormalities.

 

IMPRESSION:

1.  Chronic changes in the left lung base.

2.  No acute cardiopulmonary process.

 

POS: GLORIA

## 2018-12-01 ENCOUNTER — HOSPITAL ENCOUNTER (INPATIENT)
Dept: HOSPITAL 92 - ERS | Age: 58
LOS: 9 days | Discharge: NURSING, REHAB OR LONG-TERM CARE | DRG: 698 | End: 2018-12-10
Attending: INTERNAL MEDICINE | Admitting: INTERNAL MEDICINE
Payer: MEDICARE

## 2018-12-01 VITALS — BODY MASS INDEX: 43.3 KG/M2

## 2018-12-01 DIAGNOSIS — L89.620: ICD-10-CM

## 2018-12-01 DIAGNOSIS — K50.90: ICD-10-CM

## 2018-12-01 DIAGNOSIS — Z93.3: ICD-10-CM

## 2018-12-01 DIAGNOSIS — N39.0: ICD-10-CM

## 2018-12-01 DIAGNOSIS — N31.9: ICD-10-CM

## 2018-12-01 DIAGNOSIS — A41.9: ICD-10-CM

## 2018-12-01 DIAGNOSIS — E87.6: ICD-10-CM

## 2018-12-01 DIAGNOSIS — T83.511A: Primary | ICD-10-CM

## 2018-12-01 DIAGNOSIS — E87.2: ICD-10-CM

## 2018-12-01 DIAGNOSIS — H54.40: ICD-10-CM

## 2018-12-01 DIAGNOSIS — G35: ICD-10-CM

## 2018-12-01 DIAGNOSIS — I73.00: ICD-10-CM

## 2018-12-01 DIAGNOSIS — I10: ICD-10-CM

## 2018-12-01 DIAGNOSIS — E66.01: ICD-10-CM

## 2018-12-01 DIAGNOSIS — R53.2: ICD-10-CM

## 2018-12-01 DIAGNOSIS — E78.5: ICD-10-CM

## 2018-12-01 DIAGNOSIS — Z87.891: ICD-10-CM

## 2018-12-01 DIAGNOSIS — F32.9: ICD-10-CM

## 2018-12-01 DIAGNOSIS — L03.116: ICD-10-CM

## 2018-12-01 LAB
ALBUMIN SERPL BCG-MCNC: 3.9 G/DL (ref 3.5–5)
ALP SERPL-CCNC: 149 U/L (ref 40–150)
ALT SERPL W P-5'-P-CCNC: 35 U/L (ref 8–55)
ANION GAP SERPL CALC-SCNC: 12 MMOL/L (ref 10–20)
AST SERPL-CCNC: 29 U/L (ref 5–34)
BACTERIA UR QL AUTO: (no result) HPF
BASOPHILS # BLD AUTO: 0 THOU/UL (ref 0–0.2)
BASOPHILS NFR BLD AUTO: 0.2 % (ref 0–1)
BILIRUB SERPL-MCNC: 0.4 MG/DL (ref 0.2–1.2)
BUN SERPL-MCNC: 10 MG/DL (ref 9.8–20.1)
CALCIUM SERPL-MCNC: 9.9 MG/DL (ref 7.8–10.44)
CHLORIDE SERPL-SCNC: 104 MMOL/L (ref 98–107)
CO2 SERPL-SCNC: 28 MMOL/L (ref 22–29)
CREAT CL PREDICTED SERPL C-G-VRATE: 0 ML/MIN (ref 70–130)
CRYSTAL-AUWI FLAG: 3.9 (ref 0–15)
EOSINOPHIL # BLD AUTO: 0 THOU/UL (ref 0–0.7)
EOSINOPHIL NFR BLD AUTO: 0.2 % (ref 0–10)
GLOBULIN SER CALC-MCNC: 4.3 G/DL (ref 2.4–3.5)
GLUCOSE SERPL-MCNC: 140 MG/DL (ref 70–105)
HEV IGM SER QL: 0.4 (ref 0–7.99)
HGB BLD-MCNC: 16.9 G/DL (ref 12–16)
HYALINE CASTS #/AREA URNS LPF: (no result) LPF
LYMPHOCYTES # BLD: 1.1 THOU/UL (ref 1.2–3.4)
LYMPHOCYTES NFR BLD AUTO: 6.6 % (ref 21–51)
MCH RBC QN AUTO: 28.8 PG (ref 27–31)
MCV RBC AUTO: 89.2 FL (ref 78–98)
MONOCYTES # BLD AUTO: 0.5 THOU/UL (ref 0.11–0.59)
MONOCYTES NFR BLD AUTO: 2.8 % (ref 0–10)
NEUTROPHILS # BLD AUTO: 15.4 THOU/UL (ref 1.4–6.5)
NEUTROPHILS NFR BLD AUTO: 90.1 % (ref 42–75)
PATHC CAST-AUWI FLAG: 1.45 (ref 0–2.49)
PLATELET # BLD AUTO: 459 THOU/UL (ref 130–400)
POTASSIUM SERPL-SCNC: 4.1 MMOL/L (ref 3.5–5.1)
RBC # BLD AUTO: 5.85 MILL/UL (ref 4.2–5.4)
RBC UR QL AUTO: (no result) HPF (ref 0–3)
SODIUM SERPL-SCNC: 140 MMOL/L (ref 136–145)
SP GR UR STRIP: 1.02 (ref 1–1.04)
SPERM-AUWI FLAG: 0 (ref 0–9.9)
WBC # BLD AUTO: 17 THOU/UL (ref 4.8–10.8)
WBC UR QL AUTO: (no result) HPF (ref 0–3)
YEAST-AUWI FLAG: 0 (ref 0–25)

## 2018-12-01 PROCEDURE — 36416 COLLJ CAPILLARY BLOOD SPEC: CPT

## 2018-12-01 PROCEDURE — 87040 BLOOD CULTURE FOR BACTERIA: CPT

## 2018-12-01 PROCEDURE — S0028 INJECTION, FAMOTIDINE, 20 MG: HCPCS

## 2018-12-01 PROCEDURE — 87077 CULTURE AEROBIC IDENTIFY: CPT

## 2018-12-01 PROCEDURE — 81003 URINALYSIS AUTO W/O SCOPE: CPT

## 2018-12-01 PROCEDURE — 83605 ASSAY OF LACTIC ACID: CPT

## 2018-12-01 PROCEDURE — 87086 URINE CULTURE/COLONY COUNT: CPT

## 2018-12-01 PROCEDURE — 96367 TX/PROPH/DG ADDL SEQ IV INF: CPT

## 2018-12-01 PROCEDURE — 71045 X-RAY EXAM CHEST 1 VIEW: CPT

## 2018-12-01 PROCEDURE — 80048 BASIC METABOLIC PNL TOTAL CA: CPT

## 2018-12-01 PROCEDURE — 87186 SC STD MICRODIL/AGAR DIL: CPT

## 2018-12-01 PROCEDURE — 93005 ELECTROCARDIOGRAM TRACING: CPT

## 2018-12-01 PROCEDURE — 94664 DEMO&/EVAL PT USE INHALER: CPT

## 2018-12-01 PROCEDURE — 80053 COMPREHEN METABOLIC PANEL: CPT

## 2018-12-01 PROCEDURE — 80202 ASSAY OF VANCOMYCIN: CPT

## 2018-12-01 PROCEDURE — 96361 HYDRATE IV INFUSION ADD-ON: CPT

## 2018-12-01 PROCEDURE — 86140 C-REACTIVE PROTEIN: CPT

## 2018-12-01 PROCEDURE — 36415 COLL VENOUS BLD VENIPUNCTURE: CPT

## 2018-12-01 PROCEDURE — 85025 COMPLETE CBC W/AUTO DIFF WBC: CPT

## 2018-12-01 PROCEDURE — 96365 THER/PROPH/DIAG IV INF INIT: CPT

## 2018-12-01 PROCEDURE — 81015 MICROSCOPIC EXAM OF URINE: CPT

## 2018-12-01 NOTE — RAD
LEFT FOOT THREE VIEWS:

12/1/18

 

INDICATION:

History of a sore in the left heel.

 

FINDINGS:  

There is soft tissue gas involving the posterior heel pad. There is diffuse osteopenia. There is scat
tered osteoporosis. No definite acute osseous abnormality is evident. 

 

IMPRESSION:  

Soft tissue wound involving the posterior heel. Diffuse osteopenia. No acute osseous abnormality. 

 

POS: BH

## 2018-12-01 NOTE — RAD
CHEST ONE VIEW:

12/1/18

 

INDICATION:

History of cough. 

 

COMPARISON:  

Prior chest radiograph dated 5/25/18.

 

FINDINGS:  

There is stable prominent left pericardial fat pad. Chronic lung changes are stable. Moderate cardiom
egaly is stable. No acute osseous abnormality is evident. 

 

IMPRESSION:  

No acute abnormality.

 

POS: BH

## 2018-12-02 LAB
ANION GAP SERPL CALC-SCNC: 10 MMOL/L (ref 10–20)
BASOPHILS # BLD AUTO: 0.1 THOU/UL (ref 0–0.2)
BASOPHILS NFR BLD AUTO: 0.3 % (ref 0–1)
BUN SERPL-MCNC: 9 MG/DL (ref 9.8–20.1)
CALCIUM SERPL-MCNC: 9 MG/DL (ref 7.8–10.44)
CHLORIDE SERPL-SCNC: 110 MMOL/L (ref 98–107)
CO2 SERPL-SCNC: 22 MMOL/L (ref 22–29)
CREAT CL PREDICTED SERPL C-G-VRATE: 240 ML/MIN (ref 70–130)
EOSINOPHIL # BLD AUTO: 0.1 THOU/UL (ref 0–0.7)
EOSINOPHIL NFR BLD AUTO: 0.5 % (ref 0–10)
GLUCOSE SERPL-MCNC: 85 MG/DL (ref 70–105)
HGB BLD-MCNC: 14.7 G/DL (ref 12–16)
LYMPHOCYTES # BLD: 4.2 THOU/UL (ref 1.2–3.4)
LYMPHOCYTES NFR BLD AUTO: 21.8 % (ref 21–51)
MCH RBC QN AUTO: 29.2 PG (ref 27–31)
MCV RBC AUTO: 88.9 FL (ref 78–98)
MONOCYTES # BLD AUTO: 1.7 THOU/UL (ref 0.11–0.59)
MONOCYTES NFR BLD AUTO: 8.5 % (ref 0–10)
NEUTROPHILS # BLD AUTO: 13.3 THOU/UL (ref 1.4–6.5)
NEUTROPHILS NFR BLD AUTO: 68.9 % (ref 42–75)
PLATELET # BLD AUTO: 407 THOU/UL (ref 130–400)
POTASSIUM SERPL-SCNC: 3.4 MMOL/L (ref 3.5–5.1)
RBC # BLD AUTO: 5.03 MILL/UL (ref 4.2–5.4)
SODIUM SERPL-SCNC: 139 MMOL/L (ref 136–145)
WBC # BLD AUTO: 19.4 THOU/UL (ref 4.8–10.8)

## 2018-12-02 RX ADMIN — MOMETASONE FUROATE AND FORMOTEROL FUMARATE DIHYDRATE SCH PUFF: 100; 5 AEROSOL RESPIRATORY (INHALATION) at 18:55

## 2018-12-02 RX ADMIN — Medication SCH: at 08:35

## 2018-12-02 RX ADMIN — SIMETHICONE SCH: 80 TABLET, CHEWABLE ORAL at 19:33

## 2018-12-02 RX ADMIN — SIMETHICONE SCH MG: 80 TABLET, CHEWABLE ORAL at 08:30

## 2018-12-02 RX ADMIN — CEFTRIAXONE SCH MLS: 2 INJECTION, POWDER, FOR SOLUTION INTRAMUSCULAR; INTRAVENOUS at 17:13

## 2018-12-02 RX ADMIN — SIMETHICONE SCH MG: 80 TABLET, CHEWABLE ORAL at 21:32

## 2018-12-02 RX ADMIN — FAMOTIDINE SCH MG: 10 INJECTION, SOLUTION INTRAVENOUS at 21:54

## 2018-12-02 RX ADMIN — Medication PRN ML: at 00:40

## 2018-12-02 RX ADMIN — SODIUM CHLORIDE SCH: 50 SOLUTION/ DROPS OPHTHALMIC at 13:22

## 2018-12-02 RX ADMIN — MOMETASONE FUROATE AND FORMOTEROL FUMARATE DIHYDRATE SCH PUFF: 100; 5 AEROSOL RESPIRATORY (INHALATION) at 07:34

## 2018-12-02 RX ADMIN — LACTOBACILLUS ACIDOPH-L.BULGARICUS 1 MILLION CELL CHEWABLE TABLET SCH TAB: at 08:31

## 2018-12-02 RX ADMIN — BRIMONIDINE TARTRATE SCH DROP: 2 SOLUTION OPHTHALMIC at 13:29

## 2018-12-02 RX ADMIN — FAMOTIDINE SCH: 10 INJECTION, SOLUTION INTRAVENOUS at 08:35

## 2018-12-02 RX ADMIN — SODIUM CHLORIDE SCH DROP: 50 SOLUTION/ DROPS OPHTHALMIC at 17:19

## 2018-12-02 RX ADMIN — SIMETHICONE SCH MG: 80 TABLET, CHEWABLE ORAL at 13:26

## 2018-12-02 RX ADMIN — BRIMONIDINE TARTRATE SCH DROP: 2 SOLUTION OPHTHALMIC at 21:42

## 2018-12-02 RX ADMIN — BRIMONIDINE TARTRATE SCH: 2 SOLUTION OPHTHALMIC at 13:22

## 2018-12-02 RX ADMIN — SODIUM CHLORIDE SCH DROP: 50 SOLUTION/ DROPS OPHTHALMIC at 21:31

## 2018-12-02 RX ADMIN — Medication PRN ML: at 03:34

## 2018-12-02 RX ADMIN — SODIUM CHLORIDE SCH DROP: 50 SOLUTION/ DROPS OPHTHALMIC at 13:23

## 2018-12-02 RX ADMIN — Medication SCH ML: at 21:42

## 2018-12-02 NOTE — MRI
MRI LEFT HEEL WITHOUT CONTRAST:

 

INDICATIONS:

History of left heel ulcer.  Concern for osteomyelitis.

 

FINDINGS:

There is some soft tissue edema overlying the posterior aspect of the heel without evidence of a defi
nite drainable fluid collection.  There is some bone marrow edema involving the superior aspect of th
e posterior tuberosity of the calcaneus, near the Achilles insertion, which is more suspicious for st
ress reaction from the Achilles.  There is no definite destructive osteolysis on this noncontrast MRI
 examination to suggest osteomyelitis.  The visualized ATFL, PTFL, and calcaneofibular ligaments appe
ar intact.  The deltoid ligaments appear intact.  The visualized aspects of the syndesmotic ligaments
 are intact.  The talar dome appears within normal limits.  No joint effusion is evident.  The planta
r fascia appears within normal limits.  There is mild edema of the intrinsic foot musculature.  There
 is subcutaneous edema involving the ankle, the hindfoot, and the dorsal aspect of the midfoot.  Lisf
ranc ligaments are intact.

 

IMPRESSION:

1.  Soft tissue ulceration at the posterior aspect of the heel.  Possible cellulitis of the foot, ank
le, and hindfoot.  This can also be seen with lymphedema.

 

2.  No definite destructive osteolysis suggestive of osteomyelitis.

 

3.  Mild marrow edema involving the superior aspect of the posterior tuberosity of the calcaneus, jonatan
r the Achilles insertion, which may reflect a stress reaction from the Achilles insertion.  Osteomyel
itis is felt to be less likely.

 

POS: KIRA

## 2018-12-02 NOTE — PDOC.PN
- Subjective


Encounter Start Date: 12/02/18


Encounter Start Time: 07:40


-: old records requested/rev





Patient seen and examined. No new complaints. No overnight events





- Objective


Resuscitation Status - Order Detail:





12/02/18 00:07


Resuscitation Status Routine 


   Resuscitation Status: DNAR: NO Resuscitation


   Discussed with: patient








MAR Reviewed: Yes


Vital Signs & Weight: 


 Vital Signs (12 hours)











  Temp Pulse Resp BP Pulse Ox


 


 12/02/18 08:13  98.7 F  98   147/88 H  97


 


 12/02/18 07:34   97  20   99


 


 12/02/18 03:41  98.1 F  102 H  15  134/78  95


 


 12/02/18 00:07  98.7 F  107 H  18  141/91 H  96


 


 12/01/18 23:35      96


 


 12/01/18 23:20  98.7 F  111 H  18  141/91 H  96








 Weight











Weight                         302 lb














I&O: 


 











 12/01/18 12/02/18 12/03/18





 06:59 06:59 06:59


 


Intake Total  1400 


 


Output Total  775 


 


Balance  625 











Result Diagrams: 


 12/02/18 05:34





 12/02/18 05:34


Radiology Reviewed by me: Yes (foot xray and chest xray)


EKG Reviewed by me: Yes (nsr)





Phys Exam





- Physical Examination


Constitutional: NAD


HEENT: PERRLA, moist MMs, sclera anicteric


Neck: no JVD, supple


Respiratory: no wheezing, no rales, no rhonchi


Cardiovascular: RRR, no significant murmur, no rub


Gastrointestinal: soft, non-tender, no distention, positive bowel sounds


morbid obesity


rothman+


Musculoskeletal: no edema, pulses present


left heel with dressing


Lymphatic: no nodes


Psychiatric: normal affect, A&O x 3


Skin: no rash, normal turgor





Dx/Plan


(1) Complicated UTI (urinary tract infection)


Code(s): N39.0 - URINARY TRACT INFECTION, SITE NOT SPECIFIED   Status: Acute   

Comment: related with indwelling rothman catheter   





(2) Hypokalemia


Code(s): E87.6 - HYPOKALEMIA   Status: Acute   





(3) Lactic acidosis


Code(s): E87.2 - ACIDOSIS   Status: Acute   





(4) Sepsis


Code(s): A41.9 - SEPSIS, UNSPECIFIED ORGANISM   Status: Acute   





(5) Colostomy in place


Code(s): Z93.3 - COLOSTOMY STATUS   Status: Chronic   





(6) HTN (hypertension)


Code(s): I10 - ESSENTIAL (PRIMARY) HYPERTENSION   Status: Chronic   





(7) Morbid obesity


Code(s): E66.01 - MORBID (SEVERE) OBESITY DUE TO EXCESS CALORIES   Status: 

Chronic   Comment: with BMI 43   





(8) Multiple sclerosis, secondary progressive


Code(s): G35 - MULTIPLE SCLEROSIS   Status: Chronic   





(9) Ulcerative colitis


Code(s): K51.90 - ULCERATIVE COLITIS, UNSPECIFIED, WITHOUT COMPLICATIONS   

Status: Chronic   





(10) Neurogenic bladder


Code(s): N31.9 - NEUROMUSCULAR DYSFUNCTION OF BLADDER, UNSPECIFIED   Status: 

Chronic   





- Plan


cont current plan of care, continue antibiotics





* continue vancomycin and zosyn


* wound care


* continue IVF


* will consult dr patel


* medication reviewed as below


* symptomatic treatment


* follow culture result.


* home medication reconciled








Review of Systems





- Review of Systems


ENT: negative: Ear Pain, Ear Discharge, Nose Pain, Nose Discharge, Nose 

Congestion, Mouth Pain, Mouth Swelling, Throat Pain, Throat Swelling, Other


Respiratory: negative: Cough, Dry, Shortness of Breath, Hemoptysis, SOB with 

Excertion, Pleuritic Pain, Sputum, Wheezing


Cardiovascular: negative: chest pain, palpitations, orthopnea, paroxysmal 

nocturnal dyspnea, edema, light headedness, other


Gastrointestinal: negative: Nausea, Vomiting, Abdominal Pain, Diarrhea, 

Constipation, Melena, Hematochezia, Other


Genitourinary: negative: Dysuria, Frequency, Incontinence, Hematuria, Retention

, Other


Musculoskeletal: negative: Neck Pain, Shoulder Pain, Arm Pain, Back Pain, Hand 

Pain, Leg Pain, Foot Pain, Other


Skin: negative: Rash, Lesions, Giovany, Bruising, Other





- Medications/Allergies


Allergies/Adverse Reactions: 


 Allergies











Allergy/AdvReac Type Severity Reaction Status Date / Time


 


clarithromycin [From Biaxin] Allergy  Hives Verified 12/02/18 00:44











Medications: 


 Current Medications





Acetaminophen (Tylenol)  650 mg PO Q4H PRN


   PRN Reason: Headache/Fever/Mild Pain (1-3)


Hydrocodone Bitart/Acetaminophen (Norco 5/325)  1 tab PO Q4H PRN


   PRN Reason: Moderate Pain (4-6)


Hydrocodone Bitart/Acetaminophen (Norco 7.5/325)  1 tab PO Q4H PRN


   PRN Reason: Severe Pain (7-10)


Acidophilus (Floranex)  1 tab PO DAILY [facility]


   Last Admin: 12/02/18 08:31 Dose:  1 tab


Baclofen (Lioresal)  15 mg PO 0600,2100 [facility]


   Last Admin: 12/02/18 05:52 Dose:  15 mg


Baclofen (Lioresal)  5 mg PO 1100,1600 [facility]


Bisacodyl (Dulcolax)  10 mg PO DAILYPRN PRN


   PRN Reason: Constipation


   Last Admin: 12/02/18 00:54 Dose:  10 mg


Brimonidine Tartrate (Alphagan 0.2% Ophth Soln)  1 drop R EYE TID [facility]


Cholecalciferol (Vitamin D3)  5,000 units PO DAILY [facility]


   Last Admin: 12/02/18 08:28 Dose:  5,000 units


Cyclobenzaprine HCl (Flexeril)  10 mg PO TID [facility]


   Last Admin: 12/02/18 08:30 Dose:  10 mg


Docusate Sodium (Colace)  100 mg PO HS [facility]


Enoxaparin Sodium (Lovenox)  40 mg SC 0900 [facility]


   Last Admin: 12/02/18 08:34 Dose:  40 mg


Famotidine (Pepcid)  20 mg SLOW IVP Q12HR [facility]


   Last Admin: 12/02/18 08:35 Dose:  Not Given


Famotidine (Pepcid)  20 mg PO BID [facility]


   Last Admin: 12/02/18 08:32 Dose:  20 mg


Fluoxetine HCl (Prozac)  20 mg PO BID [facility]


   Last Admin: 12/02/18 08:29 Dose:  20 mg


Gabapentin (Neurontin)  800 mg PO QID [facility]


   Last Admin: 12/02/18 08:32 Dose:  800 mg


Guaifenesin/Dextromethorphan (Robitussin Dm)  15 ml PO Q4H PRN


   PRN Reason: Cough


Hyoscyamine Sulfate (Levsin)  0.25 mg PO QID [facility]


   Last Admin: 12/02/18 08:31 Dose:  0.25 mg


Sodium Chloride (Normal Saline 0.9%)  1,000 mls @ 60 mls/hr IV .[id number] [facility]


   Last Admin: 12/02/18 00:39 Dose:  1,000 mls


Vancomycin HCl 2 gm/ Sodium (Chloride)  500 mls @ 250 mls/hr IVPB 0100,1300 [facility]


   Last Admin: 12/02/18 00:46 Dose:  500 mls


Piperacillin Sod/Tazobactam (Sod 3.375 gm/ Sodium Chloride)  100 mls @ 200 mls/

hr IVPB 0300,0900,1500,2100 [facility]


   Last Admin: 12/02/18 08:23 Dose:  100 mls


Ibuprofen (Motrin)  800 mg PO Q6H PRN


   PRN Reason: Fever>101/(Mi/Mod/Sev) Pain


Influenza Virus Vaccine Quadrival (Fluzone Quad [id number] Syringe)  0.5 ml IM 

.ONCE ONE


   Stop: 12/03/18 09:01


Latanoprost (Xalatan 0.005% Ophth Soln)  1 drop R EYE HS [facility]


Mesalamine (Canasa)  1,000 mg NE HSPRN PRN


   PRN Reason: Bleeding


Miscellaneous Medication (Pharmacy To Dose)  0 each IVPB PRN PRN


   PRN Reason: VANC/ZOSYN Pharmacy to Dose


Mometasone Furoate/Formoterol Fumar (Dulera 100 Mcg/5 Mcg Inhaler)  2 puff INH 

BID-RT [facility]


   Last Admin: 12/02/18 07:34 Dose:  2 puff


Ondansetron HCl (Zofran Odt)  4 mg PO Q6H PRN


   PRN Reason: Nausea/Vomiting


Ondansetron HCl (Zofran)  4 mg IVP Q6H PRN


   PRN Reason: Nausea/Vomiting


Prednisone (Prednisone)  20 mg PO QAM-WM [facility]


   Stop: 12/04/18 08:01


   Last Admin: 12/02/18 08:27 Dose:  20 mg


Prednisone (Prednisone)  10 mg PO QA-[facility]


   Stop: 12/08/18 08:01


Senna/Docusate Sodium (Senokot S)  2 tab PO BIDPRN PRN


   PRN Reason: Constipation


Simethicone (Mylicon Chewable)  240 mg PO QID [facility]


   Last Admin: 12/02/18 08:30 Dose:  240 mg


Sodium Chloride (Flush - Normal Saline)  10 ml IVF Q12HR [facility]


   Last Admin: 12/02/18 08:35 Dose:  Not Given


Sodium Chloride (Flush - Normal Saline)  10 ml IVF PRN PRN


   PRN Reason: Saline Flush


   Last Admin: 12/02/18 03:34 Dose:  10 ml


Sodium Chloride (Param-128 5% Oph Sol)  1 drop R EYE QID [facility]


Trazodone HCl (Desyrel)  50 mg PO HS PRN


   PRN Reason: Insomnia


Zolpidem Tartrate (Ambien)  5 mg PO HSPRN PRN


   PRN Reason: Insomnia

## 2018-12-02 NOTE — HP
CHIEF COMPLAINT:  Left lower heel cellulitis.



HISTORY OF PRESENT ILLNESS:  This is a 58-year-old female with past medical history

of multiple sclerosis, Crohn disease, Raynaud syndrome, scotopic sensitivity

syndrome, lymphedema, obesity, right eye blindness, presenting with left lower heel

cellulitis.  Per patient, she had a little blister that was noted at the heel.  It

was like the size of a quarter, very small, and the patient stated that she lanced

it by herself with a sterile needle and since then has become erythematous, painful,

and that prompted her to come to the hospital.  Per patient, she states that she was

diagnosed with multiple sclerosis when she was 30 years old back in __________.  The

patient states that in 2009, she became wheelchair-bound.  In 2014, she became

bedbound and now she feels like she has been progressively getting better and now

she just __________ wheelchair.  She is able to go out and she feels like she has

progressed a little better in terms of her multiple sclerosis, but the patient

states that in the past couple of days, she was having severe nerve pain, which at

that time, her gabapentin was increased, and the patient now noticed that she has a

little blister, and she lanced the blister and now it has turned to be very

erythematous and very painful.  The patient also states that due to the nerve pain

that she was having, she was also started on prednisone for her multiple sclerosis

related pain.  At this point, the patient endorses left lower extremity pain.

Otherwise, the patient denies any shortness of breath, chest pain, palpitations,

fever, nausea, vomiting, dysuria, hematuria, melena, or hematochezia.  Of note, the

patient has had cellulitis in the past.  In 2010, the patient stated that she had

something similar to what she has now. 



PAST MEDICAL HISTORY:  Significant for Crohn disease, multiple sclerosis, Raynaud,

scotopic sensitivity syndrome, right eye blindness, and lymphedema. 



FAMILY HISTORY:  Reviewed and noncontributory to this visit.



PAST SURGICAL HISTORY:  The patient had colon removed.  The patient had tubal

ligation done.  The patient had left wrist surgery in the past.  The patient had

colostomy in the past. 



PSYCHIATRIC HISTORY:  Depression.



SOCIAL HISTORY:  The patient lives at home.  The patient states that she has her

 in the home.  The patient denies any alcohol use or illicit drug use.  The

patient was a former tobacco user.  The patient smoked cigarettes.  The patient quit

a long time ago. 



ALLERGIES:  THE PATIENT IS ALLERGIC TO BIAXIN AND CLARITHROMYCIN.



CURRENT MEDICATIONS:  

1. Advair.

2. Baclofen.

3. Flexeril.

4. Brimonidine.

5. Travatan.

6. Gabapentin.

7. Melatonin.

8. Hyoscyamine.

9. Prednisone.

10. Vitamin D3.

11. Norco.



PHYSICAL EXAMINATION:

VITAL SIGNS:  Blood pressure is 152/130, pulse of 99, respiratory rate of 18,

temperature of 98.7, and O2 saturation of 97%. 



EKG that was done in the ED shows sinus tachycardia.



IMAGING:  The patient's chest films were negative.  The patient's lower extremity,

left, x-ray showed soft tissue gas involving the posterior heel.  There is diffuse

osteopenia.  There is scattered osteoporosis.  No definite acute osseous abnormality

is evident. 



LABORATORY DATA:  WBC is 17,000, hemoglobin is 16.9, platelet count is 459, and

neutrophils 90%.  Sodium is 140, potassium is 4.1, chloride is 104, carbon dioxide

of 28, BUN is 10, creatinine is 0.66, glucose is 140.  Lactic acid of 2.5.  CRP is

1.82.  Urinalysis, positive urine nitrite and leukocyte esterases. 



ASSESSMENT AND PLAN:  This is a 58-year-old female with multiple comorbidities being

admitted for; 

1. Sepsis secondary to the left lower heel ulcer.  At this point, we will rule out

osteomyelitis.  We have ordered MRI of the left lower extremity.  We will follow up

results of the MRI.  We will start the patient on broad-spectrum antibiotics.  We

will continue IV fluids.  We will monitor the patient's CBC in the a.m. 

2. Urinary tract infection, asymptomatic.  At this point, the patient is on

antibiotics.  We will continue the patient on antibiotics. 

3. History of multiple sclerosis, currently stable.  We will monitor the patient

closely.  We will continue the patient on her home medications. 

4. History of Crohn disease, currently stable.  We will continue the patient on her

home medications. 

5. Hypertension, uncontrolled at this time.  We will give the patient p.r.n. blood

pressure medications to control the patient's blood pressure. 

6. Obesity.  We will manage the patient on healthy diet.

7. Hyperlipidemia.  We will continue the patient on home medications.

8. Functional quadriplegic.  At this point, we will continue to care for the patient

and we will manage the patient accordingly. 

9. Deep venous thrombosis and gastrointestinal prophylaxis.







Job ID:  [id number]

## 2018-12-03 RX ADMIN — SODIUM CHLORIDE SCH DROP: 50 SOLUTION/ DROPS OPHTHALMIC at 09:25

## 2018-12-03 RX ADMIN — SIMETHICONE SCH MG: 80 TABLET, CHEWABLE ORAL at 09:26

## 2018-12-03 RX ADMIN — LACTOBACILLUS ACIDOPH-L.BULGARICUS 1 MILLION CELL CHEWABLE TABLET SCH TAB: at 09:27

## 2018-12-03 RX ADMIN — SODIUM CHLORIDE SCH DROP: 50 SOLUTION/ DROPS OPHTHALMIC at 21:18

## 2018-12-03 RX ADMIN — MOMETASONE FUROATE AND FORMOTEROL FUMARATE DIHYDRATE SCH PUFF: 100; 5 AEROSOL RESPIRATORY (INHALATION) at 08:02

## 2018-12-03 RX ADMIN — SODIUM CHLORIDE SCH DROP: 50 SOLUTION/ DROPS OPHTHALMIC at 13:16

## 2018-12-03 RX ADMIN — SIMETHICONE SCH MG: 80 TABLET, CHEWABLE ORAL at 21:16

## 2018-12-03 RX ADMIN — SODIUM CHLORIDE SCH DROP: 50 SOLUTION/ DROPS OPHTHALMIC at 17:12

## 2018-12-03 RX ADMIN — BRIMONIDINE TARTRATE SCH DROP: 2 SOLUTION OPHTHALMIC at 21:18

## 2018-12-03 RX ADMIN — MOMETASONE FUROATE AND FORMOTEROL FUMARATE DIHYDRATE SCH PUFF: 100; 5 AEROSOL RESPIRATORY (INHALATION) at 19:17

## 2018-12-03 RX ADMIN — SIMETHICONE SCH MG: 80 TABLET, CHEWABLE ORAL at 17:11

## 2018-12-03 RX ADMIN — BRIMONIDINE TARTRATE SCH DROP: 2 SOLUTION OPHTHALMIC at 13:16

## 2018-12-03 RX ADMIN — CEFTRIAXONE SCH MLS: 2 INJECTION, POWDER, FOR SOLUTION INTRAMUSCULAR; INTRAVENOUS at 15:19

## 2018-12-03 RX ADMIN — Medication SCH: at 21:10

## 2018-12-03 RX ADMIN — Medication SCH: at 09:30

## 2018-12-03 RX ADMIN — BRIMONIDINE TARTRATE SCH DROP: 2 SOLUTION OPHTHALMIC at 09:25

## 2018-12-03 RX ADMIN — SIMETHICONE SCH MG: 80 TABLET, CHEWABLE ORAL at 14:03

## 2018-12-03 NOTE — PQF
ALYSSA MANCIA, MULU QUINTERO MD

[id number]                                                             [facility]284

[id number]                             

                                   

CLINICAL DOCUMENTATION IMPROVEMENT CLARIFICATION FORM:  ICD-10 Updated



PLEASE DO AN ADDENDUM TO THE PROGRESS NOTE WITH ANY DOCUMENTATION UPDATES OR 
ADDITIONS AND CARRY THROUGH TO DC SUMMARY.   THANK YOU.



DATE:  12-3-18                                         ATTN:  DR. LARSON



Please exercise your independent, professional judgment in responding to the 
clarification form. 

Clinical indicators are provided on the bottom of this form for your review



Please check appropriate box(es):

[ x ] Sepsis due to UTI due to Indwelling Funk Catheter

[ x ] Sepsis due to Left Heel Cellulitis

[  ] Other diagnosis ___________

[  ] Unable to determine



In addition, please specify:

Present on Admission (POA):  [ x ] Yes             [  ] No             [  ] 
Unable to determine



For continuity of documentation, please document condition throughout progress 
notes and discharge summary.  Thank You.



CLINICAL INDICATORS - SIGNS / SYMPTOMS / LABS



LABS:  WBC    LACTIC ACID         

12-1     17.0      2.5

12-2     19.4



ED:  PULSE 99 -125;  /119

ED DX:  CELLULITIS; SEPSIS; UTI



12-1 H&P ([place]): SEPSIS SECONDARY TO LEFT LOWER HEEL ULCER; UTI, ASYMPTOMATIC.

12-2 ([facility]):  COMPLICATED UTI R/T INDWELLING FUNK CATHETER;  SEPSIS; 

12-2 ([facility]):  LEFT HEEL W/ EVIDENCE OF CELLULITIS

                                  

RISK FACTORS

ED:  HX MULTIPLE SCLEROSIS

12-1 H&P ([place]):  PRESENTED WITH LEFT LOWER HEEL CELLULITIS

12-2 ([facility])  COMPLICATED UTI R/T INDWELLING FUNK; NEUROGENIC BLADDER



TREATMENTS:

ED:  LEFT FOOT X-RAY



CPOE: 

* ID CONSULT

* 12-2  LE MRI



MAR:

VANCOMYCIN IV   12-1

CLINDAMYCIN IV   12-1

ZOSYN            IV   12-1



ROCEPHIN IV  12-2 / 12-3

IVF NS            12-1 X 2L / 12-2 / 12-3



THANK YOU,

MARCELA



(This form is maintained as a part of the permanent medical record)

 2015 [email or web address].  All Rights Reserved

Marcela Valencia RN, BS    [email or web address]    Cell Phone:  [phone]

                                                              

 

[facility]

## 2018-12-03 NOTE — CON
DATE OF CONSULTATION:  12/02/2018



REASON FOR CONSULTATION:  Heel area of ulceration and erythema.



HISTORY OF PRESENT ILLNESS:  58-year-old who has a longstanding history of multiple

sclerosis with paraparesis, Raynaud disease, and Crohn disease as well as the

patient had the total colectomy in 2014, which the main reason was because of her

problems in dealing with frequent episodes of severe constipation.  The biopsy of

the colon done by Dr. Ortega in the past identified the ulcerative colitis rather

than Crohn disease.  At this time, she presents for admission with a heel ulcer on

the left side, which developed as an initial little blister.  The patient takes very

careful precautions to prevent pressure in the sites and she believes that pressure

is not the reason for this area of blistering.  She was concerned of the possibility

of a spider bite.  She proceeded to puncture that blister with a needle and then she

developed erythema and pain, and was admitted for management.  Otherwise, she seems

to be getting better from her neurological function, at least according to her own

perception.  Denies any headaches.  No visual symptoms, sore throat, odynophagia, or

dysphagia.  No dyspnea or chest pain.  No abdominal pain.  She has a little bit

increase in stool output since she came to the hospital through the ileostomy. 



MEDICAL HISTORY:  Includes ulcerative colitis, multiple sclerosis with paraparesis,

Raynaud disease, blindness, and lymphedema. 



FAMILY HISTORY:  Noncontributory.



SURGICAL HISTORY:  Total colectomy, mostly for management of severe constipation

episodes; has had a previous tubal ligation, previous colostomy, and now an

ileostomy. 



SOCIAL HISTORY:  Lives at home, has a lot of help and spends most of the time in the

bed.  Former smoker. 



ALLERGIES:  BIAXIN.



MEDICATIONS:  Current medication list includes;

1. Tylenol.

2. Norco.

3. Floranex.

4. Lioresal.

5. Dulcolax.

6. Alphagan.

7. Vitamin D.

8. Colace.

9. Flexeril.

10. Lovenox.

11. Pepcid.

12. Prozac.

13. Neurontin.

14. Robitussin.

15. Fluzone.

16. Dulera.

17. Zosyn.

18. Vancomycin.



PHYSICAL EXAMINATION:

VITAL SIGNS:  She has been afebrile.  Other vital signs are not remarkable. 

SKIN:  Shows the area of ulceration in the left heel, measuring about 2 cm, oval

shaped.  The base is fresh and red.  There is a little bit of erythema surrounding

this area.  The patient has a peripheral IV access and has a chronic indwelling

Martinez catheter for many years now. 

HEENT:  Ocular movements conjugate.  Oral cavity is not remarkable. 

NECK:  No jugular venous distention. 

LUNGS:  Symmetric.  Clear breath sounds. 

CARDIAC:  S1, S2, regular rate.  No S3 or S4. 

ABDOMEN:  Soft.  Not distended or tender.  Ileostomy appears well.  Bowel sounds are

present. 

EXTREMITIES:  She has paraparesis and she can slightly move her toes more on the

right side than the left. 

NEUROLOGIC:  Cognitive function appears to be intact.



LABORATORY DATA:  White cell count was 17,000 and now 19.4, hemoglobin 14, platelets

407.  Chemistry with a sodium 139 and creatinine 0.56.  Liver profile was normal.

Urinalysis with 21 to 50 wbc's. 



MICROBIOLOGY:  Urine with Klebsiella, Enterobacter, and nonhemolytic Streptococcus.

2 sets of blood cultures thus far no growth. 



IMAGING STUDIES:  Include a lower extremity MRI with soft tissue ulceration,

possible cellulitis, no destructive osteolysis to suggest osteomyelitis, mild marrow

edema, superior aspect of posterior tuberosity of the calcaneus near the Achilles

insertion.  Foot x-ray with soft tissue wound and diffuse osteopenia.  There is a

chest x-ray from December 1st with no acute abnormalities noted.  There is an

abdomen and pelvis CT with no urolithiasis or obstructive uropathy noted. 



ASSESSMENT:  

1. Multiple sclerosis with paraparesis, which is quite severe.

2. Left heel area of blistering followed by ulceration after the patient decided to

poke the blister with a needle with evidence of cellulitis. 

3. Leukocytosis with a left shift.

4. Prednisone use.



DISCUSSION:  The neutrophilia is probably related mostly due to the corticosteroids

administered in the form of prednisone.  The inflammatory process in the left heel

is not of the extent that one would expect that degree of neutrophilia.  We will

transition her regimen to Rocephin daily, discontinue the current antimicrobials,

and eventually transition to oral antimicrobials for discharge planning.  The

patient is pretty adamant that she does not believe this is a pressure-induced

change since she is so careful with her foot and she suspects more of an

insect-related injury. 







Job ID:  [id number]

## 2018-12-03 NOTE — PDOC.PN
- Subjective


Encounter Start Date: 12/03/18


Encounter Start Time: 08:30





Patient seen and examined. No new complaints. No overnight events





- Objective


Resuscitation Status - Order Detail:





12/02/18 00:07


Resuscitation Status Routine 


   Resuscitation Status: DNAR: NO Resuscitation


   Discussed with: patient








MAR Reviewed: Yes


Vital Signs & Weight: 


 Vital Signs (12 hours)











  Temp Pulse Resp BP Pulse Ox


 


 12/03/18 08:00  97.5 F L  87  18  150/88 H  96


 


 12/03/18 03:57  97.9 F  93  13  163/93 H  99


 


 12/03/18 00:10  98.0 F  98  20  129/69  94 L








 Weight











Weight                         302 lb














I&O: 


 











 12/02/18 12/03/18 12/04/18





 06:59 06:59 06:59


 


Intake Total 1400 3670 


 


Output Total 775 4575 


 


Balance 625 -905 











Result Diagrams: 


 12/02/18 05:34





 12/02/18 05:34


Radiology Reviewed by me: Yes (mri leg noted)


EKG Reviewed by me: Yes (nsr)





Phys Exam





- Physical Examination


Constitutional: NAD


HEENT: PERRLA, moist MMs, sclera anicteric


Neck: no JVD, supple


Respiratory: no wheezing, no rales, no rhonchi


Cardiovascular: RRR, no significant murmur, no rub


Gastrointestinal: soft, no distention, positive bowel sounds


Musculoskeletal: no edema, pulses present


left foot with dressing


Psychiatric: normal affect, A&O x 3


Skin: no rash, normal turgor





Dx/Plan


(1) Complicated UTI (urinary tract infection)


Code(s): N39.0 - URINARY TRACT INFECTION, SITE NOT SPECIFIED   Status: Acute   

Comment: related with indwelling rothman catheter   





(2) Hypokalemia


Code(s): E87.6 - HYPOKALEMIA   Status: Acute   





(3) Lactic acidosis


Code(s): E87.2 - ACIDOSIS   Status: Acute   





(4) Sepsis


Code(s): A41.9 - SEPSIS, UNSPECIFIED ORGANISM   Status: Acute   





(5) Colostomy in place


Code(s): Z93.3 - COLOSTOMY STATUS   Status: Chronic   





(6) HTN (hypertension)


Code(s): I10 - ESSENTIAL (PRIMARY) HYPERTENSION   Status: Chronic   





(7) Morbid obesity


Code(s): E66.01 - MORBID (SEVERE) OBESITY DUE TO EXCESS CALORIES   Status: 

Chronic   Comment: with BMI 43   





(8) Multiple sclerosis, secondary progressive


Code(s): G35 - MULTIPLE SCLEROSIS   Status: Chronic   





(9) Ulcerative colitis


Code(s): K51.90 - ULCERATIVE COLITIS, UNSPECIFIED, WITHOUT COMPLICATIONS   

Status: Chronic   





(10) Neurogenic bladder


Code(s): N31.9 - NEUROMUSCULAR DYSFUNCTION OF BLADDER, UNSPECIFIED   Status: 

Chronic   





- Plan


cont current plan of care, continue antibiotics





* medication reviewed as below


* symptomatic treatment


* continue rocephin.


* wound care


* transfer to medical








Review of Systems





- Review of Systems


ENT: negative: Ear Pain, Ear Discharge, Nose Pain, Nose Discharge, Nose 

Congestion, Mouth Pain, Mouth Swelling, Throat Pain, Throat Swelling, Other


Respiratory: negative: Cough, Dry, Shortness of Breath, Hemoptysis, SOB with 

Excertion, Pleuritic Pain, Sputum, Wheezing


Cardiovascular: negative: chest pain, palpitations, orthopnea, paroxysmal 

nocturnal dyspnea, edema, light headedness, other


Gastrointestinal: negative: Nausea, Vomiting, Abdominal Pain, Diarrhea, 

Constipation, Melena, Hematochezia, Other


Genitourinary: negative: Dysuria, Frequency, Incontinence, Hematuria, Retention

, Other


Musculoskeletal: negative: Neck Pain, Shoulder Pain, Arm Pain, Back Pain, Hand 

Pain, Leg Pain, Foot Pain, Other


Skin: negative: Rash, Lesions, Giovany, Bruising, Other





- Medications/Allergies


Allergies/Adverse Reactions: 


 Allergies











Allergy/AdvReac Type Severity Reaction Status Date / Time


 


clarithromycin [From Biaxin] Allergy  Hives Verified 12/02/18 00:44











Medications: 


 Current Medications





Acetaminophen (Tylenol)  650 mg PO Q4H PRN


   PRN Reason: Headache/Fever/Mild Pain (1-3)


Hydrocodone Bitart/Acetaminophen (Norco 5/325)  1 tab PO Q4H PRN


   PRN Reason: Moderate Pain (4-6)


Hydrocodone Bitart/Acetaminophen (Norco 7.5/325)  1 tab PO Q4H PRN


   PRN Reason: Severe Pain (7-10)


Acidophilus (Floranex)  1 tab PO DAILY [facility]


   Last Admin: 12/03/18 09:27 Dose:  1 tab


Baclofen (Lioresal)  15 mg PO 0600,2100 [facility]


   Last Admin: 12/03/18 06:44 Dose:  15 mg


Baclofen (Lioresal)  5 mg PO 1100,1600 [facility]


   Last Admin: 12/02/18 17:13 Dose:  5 mg


Bisacodyl (Dulcolax)  10 mg PO DAILYPRN PRN


   PRN Reason: Constipation


   Last Admin: 12/02/18 00:54 Dose:  10 mg


Brimonidine Tartrate (Alphagan 0.2% Ophth Soln)  1 drop R EYE TID [facility]


   Last Admin: 12/03/18 09:25 Dose:  1 drop


Cholecalciferol (Vitamin D3)  5,000 units PO DAILY [facility]


   Last Admin: 12/03/18 09:27 Dose:  5,000 units


Cyclobenzaprine HCl (Flexeril)  10 mg PO TID [facility]


   Last Admin: 12/03/18 09:27 Dose:  10 mg


Docusate Sodium (Colace)  100 mg PO HS [facility]


   Last Admin: 12/02/18 21:38 Dose:  100 mg


Enoxaparin Sodium (Lovenox)  40 mg SC 0900 [facility]


   Last Admin: 12/03/18 09:25 Dose:  40 mg


Famotidine (Pepcid)  20 mg PO BID [facility]


   Last Admin: 12/03/18 09:27 Dose:  20 mg


Fluoxetine HCl (Prozac)  20 mg PO BID [facility]


   Last Admin: 12/03/18 09:26 Dose:  20 mg


Gabapentin (Neurontin)  800 mg PO QID [facility]


   Last Admin: 12/03/18 09:26 Dose:  800 mg


Guaifenesin/Dextromethorphan (Robitussin Dm)  15 ml PO Q4H PRN


   PRN Reason: Cough


Hyoscyamine Sulfate (Levsin)  0.25 mg PO QID [facility]


   Last Admin: 12/03/18 09:26 Dose:  0.25 mg


Sodium Chloride (Normal Saline 0.9%)  1,000 mls @ 60 mls/hr IV .[id number] [facility]


   Last Admin: 12/03/18 04:03 Dose:  1,000 mls


Ceftriaxone Sodium 2 gm/ (Sodium Chloride)  100 mls @ 200 mls/hr IVPB 1600 [facility]


   Last Admin: 12/02/18 17:13 Dose:  100 mls


Ibuprofen (Motrin)  800 mg PO Q6H PRN


   PRN Reason: Fever>101/(Mi/Mod/Sev) Pain


Latanoprost (Xalatan 0.005% Ophth Soln)  1 drop R EYE HS [facility]


   Last Admin: 12/02/18 21:41 Dose:  1 drop


Mesalamine (Canasa)  1,000 mg NV HSPRN PRN


   PRN Reason: Bleeding


Mometasone Furoate/Formoterol Fumar (Dulera 100 Mcg/5 Mcg Inhaler)  2 puff INH 

BID-RT [facility]


   Last Admin: 12/03/18 08:02 Dose:  2 puff


Ondansetron HCl (Zofran Odt)  4 mg PO Q6H PRN


   PRN Reason: Nausea/Vomiting


Ondansetron HCl (Zofran)  4 mg IVP Q6H PRN


   PRN Reason: Nausea/Vomiting


Prednisone (Prednisone)  20 mg PO QA-[facility]


   Stop: 12/04/18 08:01


   Last Admin: 12/03/18 09:26 Dose:  20 mg


Prednisone (Prednisone)  10 mg PO [facility]


   Stop: 12/08/18 08:01


Senna/Docusate Sodium (Senokot S)  2 tab PO BIDPRN PRN


   PRN Reason: Constipation


Simethicone (Mylicon Chewable)  240 mg PO QID [facility]


   Last Admin: 12/03/18 09:26 Dose:  240 mg


Sodium Chloride (Flush - Normal Saline)  10 ml IVF Q12HR [facility]


   Last Admin: 12/03/18 09:30 Dose:  Not Given


Sodium Chloride (Flush - Normal Saline)  10 ml IVF PRN PRN


   PRN Reason: Saline Flush


   Last Admin: 12/02/18 03:34 Dose:  10 ml


Sodium Chloride (Param-128 5% Oph Sol)  1 drop R EYE QID [facility]


   Last Admin: 12/03/18 09:25 Dose:  1 drop


Trazodone HCl (Desyrel)  50 mg PO HS PRN


   PRN Reason: Insomnia


Zolpidem Tartrate (Ambien)  5 mg PO HSPRN PRN


   PRN Reason: Insomnia

## 2018-12-04 LAB
ANION GAP SERPL CALC-SCNC: 11 MMOL/L (ref 10–20)
BASOPHILS # BLD AUTO: 0.1 THOU/UL (ref 0–0.2)
BASOPHILS NFR BLD AUTO: 0.5 % (ref 0–1)
BUN SERPL-MCNC: 9 MG/DL (ref 9.8–20.1)
CALCIUM SERPL-MCNC: 9 MG/DL (ref 7.8–10.44)
CHLORIDE SERPL-SCNC: 106 MMOL/L (ref 98–107)
CO2 SERPL-SCNC: 25 MMOL/L (ref 22–29)
CREAT CL PREDICTED SERPL C-G-VRATE: 240 ML/MIN (ref 70–130)
CRP SERPL-MCNC: 1.57 MG/DL
EOSINOPHIL # BLD AUTO: 0.6 THOU/UL (ref 0–0.7)
EOSINOPHIL NFR BLD AUTO: 4.1 % (ref 0–10)
GLUCOSE SERPL-MCNC: 86 MG/DL (ref 70–105)
HGB BLD-MCNC: 15 G/DL (ref 12–16)
LYMPHOCYTES # BLD: 4.4 THOU/UL (ref 1.2–3.4)
LYMPHOCYTES NFR BLD AUTO: 29.9 % (ref 21–51)
MCH RBC QN AUTO: 27.9 PG (ref 27–31)
MCV RBC AUTO: 89.1 FL (ref 78–98)
MDIFF COMPLETE?: YES
MONOCYTES # BLD AUTO: 1.2 THOU/UL (ref 0.11–0.59)
MONOCYTES NFR BLD AUTO: 7.8 % (ref 0–10)
NEUTROPHILS # BLD AUTO: 8.5 THOU/UL (ref 1.4–6.5)
NEUTROPHILS NFR BLD AUTO: 57.6 % (ref 42–75)
PLATELET # BLD AUTO: 405 THOU/UL (ref 130–400)
PLATELET BLD QL SMEAR: (no result)
POTASSIUM SERPL-SCNC: 3.7 MMOL/L (ref 3.5–5.1)
RBC # BLD AUTO: 5.39 MILL/UL (ref 4.2–5.4)
SODIUM SERPL-SCNC: 138 MMOL/L (ref 136–145)
VANCOMYCIN TROUGH SERPL-MCNC: 4.6 UG/ML
WBC # BLD AUTO: 14.8 THOU/UL (ref 4.8–10.8)

## 2018-12-04 RX ADMIN — SIMETHICONE SCH MG: 80 TABLET, CHEWABLE ORAL at 16:06

## 2018-12-04 RX ADMIN — SODIUM CHLORIDE SCH DROP: 50 SOLUTION/ DROPS OPHTHALMIC at 08:13

## 2018-12-04 RX ADMIN — SODIUM CHLORIDE SCH DROP: 50 SOLUTION/ DROPS OPHTHALMIC at 13:54

## 2018-12-04 RX ADMIN — MOMETASONE FUROATE AND FORMOTEROL FUMARATE DIHYDRATE SCH PUFF: 100; 5 AEROSOL RESPIRATORY (INHALATION) at 07:10

## 2018-12-04 RX ADMIN — MOMETASONE FUROATE AND FORMOTEROL FUMARATE DIHYDRATE SCH PUFF: 100; 5 AEROSOL RESPIRATORY (INHALATION) at 18:48

## 2018-12-04 RX ADMIN — SIMETHICONE SCH MG: 80 TABLET, CHEWABLE ORAL at 20:47

## 2018-12-04 RX ADMIN — AMOXICILLIN AND CLAVULANATE POTASSIUM SCH MG: 875; 125 TABLET, FILM COATED ORAL at 20:33

## 2018-12-04 RX ADMIN — BRIMONIDINE TARTRATE SCH DROP: 2 SOLUTION OPHTHALMIC at 14:03

## 2018-12-04 RX ADMIN — BRIMONIDINE TARTRATE SCH DROP: 2 SOLUTION OPHTHALMIC at 20:35

## 2018-12-04 RX ADMIN — Medication SCH ML: at 21:08

## 2018-12-04 RX ADMIN — LACTOBACILLUS ACIDOPH-L.BULGARICUS 1 MILLION CELL CHEWABLE TABLET SCH TAB: at 08:11

## 2018-12-04 RX ADMIN — BRIMONIDINE TARTRATE SCH DROP: 2 SOLUTION OPHTHALMIC at 08:14

## 2018-12-04 RX ADMIN — SODIUM CHLORIDE SCH DROP: 50 SOLUTION/ DROPS OPHTHALMIC at 20:38

## 2018-12-04 RX ADMIN — SODIUM CHLORIDE SCH DROP: 50 SOLUTION/ DROPS OPHTHALMIC at 16:07

## 2018-12-04 RX ADMIN — SIMETHICONE SCH MG: 80 TABLET, CHEWABLE ORAL at 08:12

## 2018-12-04 RX ADMIN — SIMETHICONE SCH MG: 80 TABLET, CHEWABLE ORAL at 13:54

## 2018-12-04 RX ADMIN — CEFTRIAXONE SCH MLS: 2 INJECTION, POWDER, FOR SOLUTION INTRAMUSCULAR; INTRAVENOUS at 16:40

## 2018-12-04 RX ADMIN — Medication SCH ML: at 08:14

## 2018-12-04 NOTE — PDOC.PN
- Subjective


Encounter Start Date: 12/04/18


Encounter Start Time: 09:45





Patient seen and examined. No new complaints. No overnight events





- Objective


Resuscitation Status - Order Detail:





12/02/18 00:07


Resuscitation Status Routine 


   Resuscitation Status: DNAR: NO Resuscitation


   Discussed with: patient








MAR Reviewed: Yes


Vital Signs & Weight: 


 Vital Signs (12 hours)











  Temp Pulse Resp BP Pulse Ox


 


 12/04/18 09:00      96


 


 12/04/18 07:31  97.7 F  95  18  133/83  96


 


 12/04/18 05:43  97.7 F  95  18  127/85  97


 


 12/04/18 01:00  97.9 F  100  16  125/80  94 L








 Weight











Admit Weight                   302 lb


 


Weight                         302 lb














I&O: 


 











 12/03/18 12/04/18 12/05/18





 06:59 06:59 06:59


 


Intake Total 3670 2848 


 


Output Total 4575 3850 


 


Balance -905 -1002 











Result Diagrams: 


 12/04/18 08:28





 12/04/18 08:28


Additional Labs: 


 Accuchecks











  12/04/18





  05:21


 


POC Glucose  96














Phys Exam





- Physical Examination


Constitutional: NAD


HEENT: PERRLA, moist MMs, sclera anicteric


Neck: no JVD, supple


Respiratory: no wheezing, no rales, no rhonchi


Cardiovascular: RRR, no significant murmur, no rub


Gastrointestinal: soft, non-tender, no distention, positive bowel sounds


Musculoskeletal: no edema, pulses present


left foot wound with dressing


Neurological: non-focal, normal sensation


Lymphatic: no nodes


Psychiatric: normal affect


Skin: no rash, normal turgor





Dx/Plan


(1) Complicated UTI (urinary tract infection)


Code(s): N39.0 - URINARY TRACT INFECTION, SITE NOT SPECIFIED   Status: Acute   

Comment: related with indwelling rothman catheter   





(2) Hypokalemia


Code(s): E87.6 - HYPOKALEMIA   Status: Acute   





(3) Lactic acidosis


Code(s): E87.2 - ACIDOSIS   Status: Acute   





(4) Sepsis


Code(s): A41.9 - SEPSIS, UNSPECIFIED ORGANISM   Status: Acute   





(5) Colostomy in place


Code(s): Z93.3 - COLOSTOMY STATUS   Status: Chronic   





(6) HTN (hypertension)


Code(s): I10 - ESSENTIAL (PRIMARY) HYPERTENSION   Status: Chronic   





(7) Morbid obesity


Code(s): E66.01 - MORBID (SEVERE) OBESITY DUE TO EXCESS CALORIES   Status: 

Chronic   Comment: with BMI 43   





(8) Multiple sclerosis, secondary progressive


Code(s): G35 - MULTIPLE SCLEROSIS   Status: Chronic   





(9) Ulcerative colitis


Code(s): K51.90 - ULCERATIVE COLITIS, UNSPECIFIED, WITHOUT COMPLICATIONS   

Status: Chronic   





(10) Neurogenic bladder


Code(s): N31.9 - NEUROMUSCULAR DYSFUNCTION OF BLADDER, UNSPECIFIED   Status: 

Chronic   





- Plan


cont current plan of care, continue antibiotics





* continue rocephin


* add macrobid


* medication reviewed as below


* symptomatic treatment


* wound care.








Review of Systems





- Review of Systems


ENT: negative: Ear Pain, Ear Discharge, Nose Pain, Nose Discharge, Nose 

Congestion, Mouth Pain, Mouth Swelling, Throat Pain, Throat Swelling, Other


Respiratory: negative: Cough, Dry, Shortness of Breath, Hemoptysis, SOB with 

Excertion, Pleuritic Pain, Sputum, Wheezing


Cardiovascular: negative: chest pain, palpitations, orthopnea, paroxysmal 

nocturnal dyspnea, edema, light headedness, other


Gastrointestinal: negative: Nausea, Vomiting, Abdominal Pain, Diarrhea, 

Constipation, Melena, Hematochezia, Other


Genitourinary: negative: Dysuria, Frequency, Incontinence, Hematuria, Retention

, Other


Musculoskeletal: negative: Neck Pain, Shoulder Pain, Arm Pain, Back Pain, Hand 

Pain, Leg Pain, Foot Pain, Other





- Medications/Allergies


Allergies/Adverse Reactions: 


 Allergies











Allergy/AdvReac Type Severity Reaction Status Date / Time


 


clarithromycin [From Biaxin] Allergy  Hives Verified 12/02/18 00:44











Medications: 


 Current Medications





Acetaminophen (Tylenol)  650 mg PO Q4H PRN


   PRN Reason: Headache/Fever/Mild Pain (1-3)


Hydrocodone Bitart/Acetaminophen (Norco 5/325)  1 tab PO Q4H PRN


   PRN Reason: Moderate Pain (4-6)


Hydrocodone Bitart/Acetaminophen (Norco 7.5/325)  1 tab PO Q4H PRN


   PRN Reason: Severe Pain (7-10)


Acidophilus (Floranex)  1 tab PO DAILY [facility]


   Last Admin: 12/04/18 08:11 Dose:  1 tab


Baclofen (Lioresal)  15 mg PO 0600,2100 [facility]


   Last Admin: 12/04/18 06:05 Dose:  15 mg


Baclofen (Lioresal)  5 mg PO 1100,1600 [facility]


   Last Admin: 12/04/18 11:07 Dose:  5 mg


Bisacodyl (Dulcolax)  10 mg PO DAILYPRN PRN


   PRN Reason: Constipation


   Last Admin: 12/02/18 00:54 Dose:  10 mg


Brimonidine Tartrate (Alphagan 0.2% Ophth Soln)  1 drop R EYE TID [facility]


   Last Admin: 12/04/18 08:14 Dose:  1 drop


Cholecalciferol (Vitamin D3)  5,000 units PO DAILY [facility]


   Last Admin: 12/04/18 09:02 Dose:  5,000 units


Cyclobenzaprine HCl (Flexeril)  10 mg PO TID [facility]


   Last Admin: 12/04/18 08:11 Dose:  10 mg


Docusate Sodium (Colace)  100 mg PO HS [facility]


   Last Admin: 12/03/18 21:17 Dose:  100 mg


Enoxaparin Sodium (Lovenox)  40 mg SC 0900 [facility]


   Last Admin: 12/04/18 08:12 Dose:  40 mg


Famotidine (Pepcid)  20 mg PO BID [facility]


   Last Admin: 12/04/18 08:11 Dose:  20 mg


Fluoxetine HCl (Prozac)  20 mg PO BID [facility]


   Last Admin: 12/04/18 08:11 Dose:  20 mg


Gabapentin (Neurontin)  800 mg PO QID [facility]


   Last Admin: 12/04/18 08:11 Dose:  800 mg


Guaifenesin/Dextromethorphan (Robitussin Dm)  15 ml PO Q4H PRN


   PRN Reason: Cough


Hyoscyamine Sulfate (Levsin)  0.25 mg PO QID [facility]


   Last Admin: 12/04/18 08:10 Dose:  0.25 mg


Ceftriaxone Sodium 2 gm/ (Sodium Chloride)  100 mls @ 200 mls/hr IVPB 1600 [facility]


   Last Admin: 12/03/18 15:19 Dose:  100 mls


Ibuprofen (Motrin)  800 mg PO Q6H PRN


   PRN Reason: Fever>101/(Mi/Mod/Sev) Pain


Latanoprost (Xalatan 0.005% Ophth Soln)  1 drop R EYE HS [facility]


   Last Admin: 12/03/18 21:18 Dose:  1 drop


Mesalamine (Canasa)  1,000 mg ME HSPRN PRN


   PRN Reason: Bleeding


Mometasone Furoate/Formoterol Fumar (Dulera 100 Mcg/5 Mcg Inhaler)  2 puff INH 

BID-RT [facility]


   Last Admin: 12/04/18 07:10 Dose:  2 puff


Ondansetron HCl (Zofran Odt)  4 mg PO Q6H PRN


   PRN Reason: Nausea/Vomiting


Ondansetron HCl (Zofran)  4 mg IVP Q6H PRN


   PRN Reason: Nausea/Vomiting


Prednisone (Prednisone)  10 mg PO QAM-[facility]


   Stop: 12/08/18 08:01


Senna/Docusate Sodium (Senokot S)  2 tab PO BIDPRN PRN


   PRN Reason: Constipation


Simethicone (Mylicon Chewable)  240 mg PO QID [facility]


   Last Admin: 12/04/18 08:12 Dose:  240 mg


Sodium Chloride (Flush - Normal Saline)  10 ml IVF Q12HR [facility]


   Last Admin: 12/04/18 08:14 Dose:  10 ml


Sodium Chloride (Flush - Normal Saline)  10 ml IVF PRN PRN


   PRN Reason: Saline Flush


   Last Admin: 12/02/18 03:34 Dose:  10 ml


Sodium Chloride (Param-128 5% Oph Sol)  1 drop R EYE QID [facility]


   Last Admin: 12/04/18 08:13 Dose:  1 drop


Trazodone HCl (Desyrel)  50 mg PO HS PRN


   PRN Reason: Insomnia


Zolpidem Tartrate (Ambien)  5 mg PO HSPRN PRN


   PRN Reason: Insomnia

## 2018-12-05 RX ADMIN — SIMETHICONE SCH MG: 80 TABLET, CHEWABLE ORAL at 16:24

## 2018-12-05 RX ADMIN — Medication SCH ML: at 21:31

## 2018-12-05 RX ADMIN — SIMETHICONE SCH MG: 80 TABLET, CHEWABLE ORAL at 11:42

## 2018-12-05 RX ADMIN — SIMETHICONE SCH MG: 80 TABLET, CHEWABLE ORAL at 21:10

## 2018-12-05 RX ADMIN — BRIMONIDINE TARTRATE SCH DROP: 2 SOLUTION OPHTHALMIC at 14:26

## 2018-12-05 RX ADMIN — SODIUM CHLORIDE SCH DROP: 50 SOLUTION/ DROPS OPHTHALMIC at 16:25

## 2018-12-05 RX ADMIN — SODIUM CHLORIDE SCH DROP: 50 SOLUTION/ DROPS OPHTHALMIC at 21:07

## 2018-12-05 RX ADMIN — MOMETASONE FUROATE AND FORMOTEROL FUMARATE DIHYDRATE SCH PUFF: 100; 5 AEROSOL RESPIRATORY (INHALATION) at 19:32

## 2018-12-05 RX ADMIN — BRIMONIDINE TARTRATE SCH DROP: 2 SOLUTION OPHTHALMIC at 09:09

## 2018-12-05 RX ADMIN — SODIUM CHLORIDE SCH DROP: 50 SOLUTION/ DROPS OPHTHALMIC at 14:26

## 2018-12-05 RX ADMIN — SIMETHICONE SCH MG: 80 TABLET, CHEWABLE ORAL at 09:08

## 2018-12-05 RX ADMIN — AMOXICILLIN AND CLAVULANATE POTASSIUM SCH MG: 875; 125 TABLET, FILM COATED ORAL at 09:06

## 2018-12-05 RX ADMIN — SODIUM CHLORIDE SCH DROP: 50 SOLUTION/ DROPS OPHTHALMIC at 09:09

## 2018-12-05 RX ADMIN — MOMETASONE FUROATE AND FORMOTEROL FUMARATE DIHYDRATE SCH PUFF: 100; 5 AEROSOL RESPIRATORY (INHALATION) at 07:11

## 2018-12-05 RX ADMIN — LACTOBACILLUS ACIDOPH-L.BULGARICUS 1 MILLION CELL CHEWABLE TABLET SCH TAB: at 09:07

## 2018-12-05 RX ADMIN — Medication SCH ML: at 09:08

## 2018-12-05 RX ADMIN — AMOXICILLIN AND CLAVULANATE POTASSIUM SCH MG: 875; 125 TABLET, FILM COATED ORAL at 21:07

## 2018-12-05 RX ADMIN — BRIMONIDINE TARTRATE SCH DROP: 2 SOLUTION OPHTHALMIC at 21:07

## 2018-12-05 NOTE — PDOC.PN
- Subjective


Encounter Start Date: 12/05/18


Encounter Start Time: 08:50





Patient seen and examined. No new complaints. No overnight events





- Objective


Resuscitation Status - Order Detail:





12/02/18 00:07


Resuscitation Status Routine 


   Resuscitation Status: DNAR: NO Resuscitation


   Discussed with: patient








MAR Reviewed: Yes


Vital Signs & Weight: 


 Vital Signs (12 hours)











  Temp Pulse Resp BP Pulse Ox


 


 12/05/18 08:03  97.8 F  79  18  129/86  99


 


 12/05/18 07:11   85  16   98


 


 12/05/18 04:00  97.5 F L  90  18  115/79  95


 


 12/05/18 00:00  98.1 F  90  18  127/86  96








 Weight











Admit Weight                   302 lb


 


Weight                         302 lb














I&O: 


 











 12/04/18 12/05/18 12/06/18





 06:59 06:59 06:59


 


Intake Total 2848 2920 


 


Output Total 3850 5850 


 


Balance -1002 2938 











Result Diagrams: 


 12/04/18 08:28





 12/04/18 08:28





Phys Exam





- Physical Examination


Constitutional: NAD


HEENT: PERRLA, moist MMs, sclera anicteric


Neck: no JVD, supple


Respiratory: no wheezing, no rales, no rhonchi


Cardiovascular: RRR, no significant murmur, no rub


Gastrointestinal: soft, non-tender, no distention, positive bowel sounds


morbid obesity+


Musculoskeletal: no edema, pulses present


wound with dressing


Lymphatic: no nodes


Psychiatric: normal affect


Skin: no rash, normal turgor





Dx/Plan


(1) Complicated UTI (urinary tract infection)


Code(s): N39.0 - URINARY TRACT INFECTION, SITE NOT SPECIFIED   Status: Acute   

Comment: related with indwelling rothman catheter   





(2) Hypokalemia


Code(s): E87.6 - HYPOKALEMIA   Status: Acute   





(3) Lactic acidosis


Code(s): E87.2 - ACIDOSIS   Status: Acute   





(4) Sepsis


Code(s): A41.9 - SEPSIS, UNSPECIFIED ORGANISM   Status: Acute   





(5) Colostomy in place


Code(s): Z93.3 - COLOSTOMY STATUS   Status: Chronic   





(6) HTN (hypertension)


Code(s): I10 - ESSENTIAL (PRIMARY) HYPERTENSION   Status: Chronic   





(7) Morbid obesity


Code(s): E66.01 - MORBID (SEVERE) OBESITY DUE TO EXCESS CALORIES   Status: 

Chronic   Comment: with BMI 43   





(8) Multiple sclerosis, secondary progressive


Code(s): G35 - MULTIPLE SCLEROSIS   Status: Chronic   





(9) Ulcerative colitis


Code(s): K51.90 - ULCERATIVE COLITIS, UNSPECIFIED, WITHOUT COMPLICATIONS   

Status: Chronic   





(10) Neurogenic bladder


Code(s): N31.9 - NEUROMUSCULAR DYSFUNCTION OF BLADDER, UNSPECIFIED   Status: 

Chronic   





- Plan


cont current plan of care, continue antibiotics, PT/OT, 





* medication reviewed as below


* symptomatic treatment


* rehab screen


* continue macrobid, rocephin


* PT/OT.








Review of Systems





- Review of Systems


ENT: negative: Ear Pain, Ear Discharge, Nose Pain, Nose Discharge, Nose 

Congestion, Mouth Pain, Mouth Swelling, Throat Pain, Throat Swelling, Other


Respiratory: negative: Cough, Dry, Shortness of Breath, Hemoptysis, SOB with 

Excertion, Pleuritic Pain, Sputum, Wheezing


Cardiovascular: negative: chest pain, palpitations, orthopnea, paroxysmal 

nocturnal dyspnea, edema, light headedness, other


Gastrointestinal: negative: Nausea, Vomiting, Abdominal Pain, Diarrhea, 

Constipation, Melena, Hematochezia, Other


Genitourinary: negative: Dysuria, Frequency, Incontinence, Hematuria, Retention

, Other


Musculoskeletal: negative: Neck Pain, Shoulder Pain, Arm Pain, Back Pain, Hand 

Pain, Leg Pain, Foot Pain, Other





- Medications/Allergies


Allergies/Adverse Reactions: 


 Allergies











Allergy/AdvReac Type Severity Reaction Status Date / Time


 


clarithromycin [From Biaxin] Allergy  Hives Verified 12/02/18 00:44











Medications: 


 Current Medications





Acetaminophen (Tylenol)  650 mg PO Q4H PRN


   PRN Reason: Headache/Fever/Mild Pain (1-3)


Hydrocodone Bitart/Acetaminophen (Norco 5/325)  1 tab PO Q4H PRN


   PRN Reason: Moderate Pain (4-6)


Hydrocodone Bitart/Acetaminophen (Norco 7.5/325)  1 tab PO Q4H PRN


   PRN Reason: Severe Pain (7-10)


Acidophilus (Floranex)  1 tab PO DAILY [facility]


   Last Admin: 12/05/18 09:07 Dose:  1 tab


Amoxicillin/Clavulanate Potassium (Augmentin)  875 mg PO BID [facility]


   Last Admin: 12/05/18 09:06 Dose:  875 mg


Baclofen (Lioresal)  15 mg PO 0600,2100 [facility]


   Last Admin: 12/05/18 06:34 Dose:  15 mg


Baclofen (Lioresal)  5 mg PO 1100,1600 [facility]


   Last Admin: 12/04/18 16:06 Dose:  5 mg


Bisacodyl (Dulcolax)  10 mg PO DAILYPRN PRN


   PRN Reason: Constipation


   Last Admin: 12/02/18 00:54 Dose:  10 mg


Brimonidine Tartrate (Alphagan 0.2% Ophth Soln)  1 drop R EYE TID [facility]


   Last Admin: 12/05/18 09:09 Dose:  1 drop


Cholecalciferol (Vitamin D3)  5,000 units PO DAILY [facility]


   Last Admin: 12/05/18 09:06 Dose:  5,000 units


Cyclobenzaprine HCl (Flexeril)  10 mg PO TID [facility]


   Last Admin: 12/05/18 09:06 Dose:  10 mg


Docusate Sodium (Colace)  100 mg PO HS [facility]


   Last Admin: 12/04/18 20:35 Dose:  100 mg


Enoxaparin Sodium (Lovenox)  40 mg SC 0900 [facility]


   Last Admin: 12/05/18 09:07 Dose:  40 mg


Famotidine (Pepcid)  20 mg PO BID [facility]


   Last Admin: 12/05/18 09:07 Dose:  20 mg


Fluoxetine HCl (Prozac)  20 mg PO BID [facility]


   Last Admin: 12/05/18 09:07 Dose:  20 mg


Gabapentin (Neurontin)  800 mg PO QID [facility]


   Last Admin: 12/05/18 09:07 Dose:  800 mg


Guaifenesin/Dextromethorphan (Robitussin Dm)  15 ml PO Q4H PRN


   PRN Reason: Cough


Hyoscyamine Sulfate (Levsin)  0.25 mg PO QID [facility]


   Last Admin: 12/05/18 09:07 Dose:  0.25 mg


Ibuprofen (Motrin)  800 mg PO Q6H PRN


   PRN Reason: Fever>101/(Mi/Mod/Sev) Pain


Latanoprost (Xalatan 0.005% Ophth Soln)  1 drop R EYE HS [facility]


   Last Admin: 12/04/18 20:37 Dose:  1 drop


Mesalamine (Canasa)  1,000 mg [place] HSPRN PRN


   PRN Reason: Bleeding


Mometasone Furoate/Formoterol Fumar (Dulera 100 Mcg/5 Mcg Inhaler)  2 puff INH 

BID-RT [facility]


   Last Admin: 12/05/18 07:11 Dose:  2 puff


Ondansetron HCl (Zofran Odt)  4 mg PO Q6H PRN


   PRN Reason: Nausea/Vomiting


Ondansetron HCl (Zofran)  4 mg IVP Q6H PRN


   PRN Reason: Nausea/Vomiting


Prednisone (Prednisone)  10 mg PO QAM-[facility]


   Stop: 12/08/18 08:01


   Last Admin: 12/05/18 09:05 Dose:  10 mg


Senna/Docusate Sodium (Senokot S)  2 tab PO BIDPRN PRN


   PRN Reason: Constipation


Simethicone (Mylicon Chewable)  240 mg PO QID [facility]


   Last Admin: 12/05/18 09:08 Dose:  240 mg


Sodium Chloride (Flush - Normal Saline)  10 ml IVF Q12HR [facility]


   Last Admin: 12/05/18 09:08 Dose:  10 ml


Sodium Chloride (Flush - Normal Saline)  10 ml IVF PRN PRN


   PRN Reason: Saline Flush


   Last Admin: 12/02/18 03:34 Dose:  10 ml


Sodium Chloride (Param-128 5% Oph Sol)  1 drop R EYE QID [facility]


   Last Admin: 12/05/18 09:09 Dose:  1 drop


Trazodone HCl (Desyrel)  50 mg PO HS PRN


   PRN Reason: Insomnia


Zolpidem Tartrate (Ambien)  5 mg PO HSPRN PRN


   PRN Reason: Insomnia

## 2018-12-06 LAB
ANION GAP SERPL CALC-SCNC: 13 MMOL/L (ref 10–20)
BASOPHILS # BLD AUTO: 0.1 THOU/UL (ref 0–0.2)
BASOPHILS NFR BLD AUTO: 1 % (ref 0–1)
BUN SERPL-MCNC: 16 MG/DL (ref 9.8–20.1)
CALCIUM SERPL-MCNC: 9.7 MG/DL (ref 7.8–10.44)
CHLORIDE SERPL-SCNC: 102 MMOL/L (ref 98–107)
CO2 SERPL-SCNC: 28 MMOL/L (ref 22–29)
CREAT CL PREDICTED SERPL C-G-VRATE: 228 ML/MIN (ref 70–130)
EOSINOPHIL # BLD AUTO: 0.4 THOU/UL (ref 0–0.7)
EOSINOPHIL NFR BLD AUTO: 3.1 % (ref 0–10)
GLUCOSE SERPL-MCNC: 85 MG/DL (ref 70–105)
HGB BLD-MCNC: 15.6 G/DL (ref 12–16)
LYMPHOCYTES # BLD: 4 THOU/UL (ref 1.2–3.4)
LYMPHOCYTES NFR BLD AUTO: 29.3 % (ref 21–51)
MCH RBC QN AUTO: 27.3 PG (ref 27–31)
MCV RBC AUTO: 90 FL (ref 78–98)
MONOCYTES # BLD AUTO: 1.1 THOU/UL (ref 0.11–0.59)
MONOCYTES NFR BLD AUTO: 8.1 % (ref 0–10)
NEUTROPHILS # BLD AUTO: 8 THOU/UL (ref 1.4–6.5)
NEUTROPHILS NFR BLD AUTO: 58.5 % (ref 42–75)
PLATELET # BLD AUTO: 408 THOU/UL (ref 130–400)
POTASSIUM SERPL-SCNC: 3.7 MMOL/L (ref 3.5–5.1)
RBC # BLD AUTO: 5.69 MILL/UL (ref 4.2–5.4)
SODIUM SERPL-SCNC: 139 MMOL/L (ref 136–145)
WBC # BLD AUTO: 13.6 THOU/UL (ref 4.8–10.8)

## 2018-12-06 RX ADMIN — BRIMONIDINE TARTRATE SCH DROP: 2 SOLUTION OPHTHALMIC at 20:56

## 2018-12-06 RX ADMIN — LACTOBACILLUS ACIDOPH-L.BULGARICUS 1 MILLION CELL CHEWABLE TABLET SCH TAB: at 08:44

## 2018-12-06 RX ADMIN — SIMETHICONE SCH MG: 80 TABLET, CHEWABLE ORAL at 12:16

## 2018-12-06 RX ADMIN — SODIUM CHLORIDE SCH DROP: 50 SOLUTION/ DROPS OPHTHALMIC at 20:56

## 2018-12-06 RX ADMIN — SODIUM CHLORIDE SCH DROP: 50 SOLUTION/ DROPS OPHTHALMIC at 16:51

## 2018-12-06 RX ADMIN — SODIUM CHLORIDE SCH DROP: 50 SOLUTION/ DROPS OPHTHALMIC at 08:46

## 2018-12-06 RX ADMIN — SODIUM CHLORIDE SCH DROP: 50 SOLUTION/ DROPS OPHTHALMIC at 14:14

## 2018-12-06 RX ADMIN — MOMETASONE FUROATE AND FORMOTEROL FUMARATE DIHYDRATE SCH PUFF: 100; 5 AEROSOL RESPIRATORY (INHALATION) at 19:50

## 2018-12-06 RX ADMIN — BRIMONIDINE TARTRATE SCH DROP: 2 SOLUTION OPHTHALMIC at 14:14

## 2018-12-06 RX ADMIN — Medication SCH ML: at 21:00

## 2018-12-06 RX ADMIN — BRIMONIDINE TARTRATE SCH DROP: 2 SOLUTION OPHTHALMIC at 08:46

## 2018-12-06 RX ADMIN — AMOXICILLIN AND CLAVULANATE POTASSIUM SCH MG: 875; 125 TABLET, FILM COATED ORAL at 08:45

## 2018-12-06 RX ADMIN — SIMETHICONE SCH MG: 80 TABLET, CHEWABLE ORAL at 16:51

## 2018-12-06 RX ADMIN — AMOXICILLIN AND CLAVULANATE POTASSIUM SCH MG: 875; 125 TABLET, FILM COATED ORAL at 20:56

## 2018-12-06 RX ADMIN — SIMETHICONE SCH MG: 80 TABLET, CHEWABLE ORAL at 08:44

## 2018-12-06 RX ADMIN — MOMETASONE FUROATE AND FORMOTEROL FUMARATE DIHYDRATE SCH PUFF: 100; 5 AEROSOL RESPIRATORY (INHALATION) at 06:25

## 2018-12-06 RX ADMIN — SIMETHICONE SCH MG: 80 TABLET, CHEWABLE ORAL at 20:59

## 2018-12-06 RX ADMIN — Medication SCH ML: at 08:47

## 2018-12-06 NOTE — PDOC.PN
- Subjective


Encounter Start Date: 12/06/18


Encounter Start Time: 09:30





Patient seen and examined. No new complaints. No overnight events





- Objective


Resuscitation Status - Order Detail:





12/02/18 00:07


Resuscitation Status Routine 


   Resuscitation Status: DNAR: NO Resuscitation


   Discussed with: patient








MAR Reviewed: Yes


Vital Signs & Weight: 


 Vital Signs (12 hours)











  Temp Pulse Resp BP Pulse Ox


 


 12/06/18 08:21  97.7 F  90  20  125/90  95


 


 12/06/18 06:25   87  16   97


 


 12/06/18 05:11  97.4 F L  90  18  131/87  95


 


 12/06/18 00:55  97.9 F  95  18  92/68  95








 Weight











Admit Weight                   302 lb


 


Weight                         302 lb














I&O: 


 











 12/05/18 12/06/18 12/07/18





 06:59 06:59 06:59


 


Intake Total 2920 3420 


 


Output Total 5850 3400 


 


Balance -2930 20 











Result Diagrams: 


 12/06/18 08:17





 12/06/18 08:17





Phys Exam





- Physical Examination


Constitutional: NAD


HEENT: PERRLA, moist MMs, sclera anicteric


Neck: no JVD, supple


Respiratory: no wheezing, no rales, no rhonchi


Cardiovascular: RRR, no significant murmur, no rub


Gastrointestinal: soft, non-tender, no distention, positive bowel sounds


Musculoskeletal: no edema, pulses present


Lymphatic: no nodes


Psychiatric: normal affect, A&O x 3


Skin: no rash, normal turgor





Dx/Plan


(1) Complicated UTI (urinary tract infection)


Code(s): N39.0 - URINARY TRACT INFECTION, SITE NOT SPECIFIED   Status: Acute   

Comment: related with indwelling rothman catheter   





(2) Hypokalemia


Code(s): E87.6 - HYPOKALEMIA   Status: Acute   





(3) Lactic acidosis


Code(s): E87.2 - ACIDOSIS   Status: Acute   





(4) Sepsis


Code(s): A41.9 - SEPSIS, UNSPECIFIED ORGANISM   Status: Acute   





(5) Colostomy in place


Code(s): Z93.3 - COLOSTOMY STATUS   Status: Chronic   





(6) HTN (hypertension)


Code(s): I10 - ESSENTIAL (PRIMARY) HYPERTENSION   Status: Chronic   





(7) Morbid obesity


Code(s): E66.01 - MORBID (SEVERE) OBESITY DUE TO EXCESS CALORIES   Status: 

Chronic   Comment: with BMI 43   





(8) Multiple sclerosis, secondary progressive


Code(s): G35 - MULTIPLE SCLEROSIS   Status: Chronic   





(9) Ulcerative colitis


Code(s): K51.90 - ULCERATIVE COLITIS, UNSPECIFIED, WITHOUT COMPLICATIONS   

Status: Chronic   





(10) Neurogenic bladder


Code(s): N31.9 - NEUROMUSCULAR DYSFUNCTION OF BLADDER, UNSPECIFIED   Status: 

Chronic   





- Plan


cont current plan of care, continue antibiotics, PT/OT, 





* continue augmentin


* will consider discharge when rehab decision made


* medication reviewed as below


* symptomatic treatment.








Review of Systems





- Review of Systems


ENT: negative: Ear Pain, Ear Discharge, Nose Pain, Nose Discharge, Nose 

Congestion, Mouth Pain, Mouth Swelling, Throat Pain, Throat Swelling, Other


Respiratory: negative: Cough, Dry, Shortness of Breath, Hemoptysis, SOB with 

Excertion, Pleuritic Pain, Sputum, Wheezing


Cardiovascular: negative: chest pain, palpitations, orthopnea, paroxysmal 

nocturnal dyspnea, edema, light headedness, other


Gastrointestinal: negative: Nausea, Vomiting, Abdominal Pain, Diarrhea, 

Constipation, Melena, Hematochezia, Other


Genitourinary: negative: Dysuria, Frequency, Incontinence, Hematuria, Retention

, Other


Musculoskeletal: negative: Neck Pain, Shoulder Pain, Arm Pain, Back Pain, Hand 

Pain, Leg Pain, Foot Pain, Other


Skin: negative: Rash, Lesions, Giovany, Bruising, Other





- Medications/Allergies


Allergies/Adverse Reactions: 


 Allergies











Allergy/AdvReac Type Severity Reaction Status Date / Time


 


clarithromycin [From Biaxin] Allergy  Hives Verified 12/02/18 00:44











Medications: 


 Current Medications





Acetaminophen (Tylenol)  650 mg PO Q4H PRN


   PRN Reason: Headache/Fever/Mild Pain (1-3)


Hydrocodone Bitart/Acetaminophen (Norco 5/325)  1 tab PO Q4H PRN


   PRN Reason: Moderate Pain (4-6)


Hydrocodone Bitart/Acetaminophen (Norco 7.5/325)  1 tab PO Q4H PRN


   PRN Reason: Severe Pain (7-10)


Acidophilus (Floranex)  1 tab PO DAILY [facility]


   Last Admin: 12/06/18 08:44 Dose:  1 tab


Amoxicillin/Clavulanate Potassium (Augmentin)  875 mg PO BID [facility]


   Last Admin: 12/06/18 08:45 Dose:  875 mg


Baclofen (Lioresal)  15 mg PO 0600,2100 [facility]


   Last Admin: 12/06/18 05:12 Dose:  15 mg


Baclofen (Lioresal)  5 mg PO 1100,1600 [facility]


   Last Admin: 12/05/18 16:24 Dose:  5 mg


Bisacodyl (Dulcolax)  10 mg PO DAILYPRN PRN


   PRN Reason: Constipation


   Last Admin: 12/02/18 00:54 Dose:  10 mg


Brimonidine Tartrate (Alphagan 0.2% Ophth Soln)  1 drop R EYE TID [facility]


   Last Admin: 12/06/18 08:46 Dose:  1 drop


Cholecalciferol (Vitamin D3)  5,000 units PO DAILY [facility]


   Last Admin: 12/06/18 08:45 Dose:  5,000 units


Cyclobenzaprine HCl (Flexeril)  10 mg PO TID [facility]


   Last Admin: 12/06/18 08:45 Dose:  10 mg


Docusate Sodium (Colace)  100 mg PO [facility]


   Last Admin: 12/05/18 21:09 Dose:  100 mg


Enoxaparin Sodium (Lovenox)  40 mg SC 0900 [facility]


   Last Admin: 12/06/18 08:46 Dose:  40 mg


Famotidine (Pepcid)  20 mg PO BID [facility]


   Last Admin: 12/06/18 08:45 Dose:  20 mg


Fluoxetine HCl (Prozac)  20 mg PO BID [facility]


   Last Admin: 12/06/18 08:45 Dose:  20 mg


Gabapentin (Neurontin)  800 mg PO QID [facility]


   Last Admin: 12/06/18 08:44 Dose:  800 mg


Guaifenesin/Dextromethorphan (Robitussin Dm)  15 ml PO Q4H PRN


   PRN Reason: Cough


Hyoscyamine Sulfate (Levsin)  0.25 mg PO QID [facility]


   Last Admin: 12/06/18 08:43 Dose:  0.25 mg


Ibuprofen (Motrin)  800 mg PO Q6H PRN


   PRN Reason: Fever>101/(Mi/Mod/Sev) Pain


   Last Admin: 12/05/18 21:27 Dose:  800 mg


Latanoprost (Xalatan 0.005% Ophth Soln)  1 drop R EYE [facility]


   Last Admin: 12/05/18 21:07 Dose:  1 drop


Mesalamine (Canasa)  1,000 mg [place] HSPRN PRN


   PRN Reason: Bleeding


Mometasone Furoate/Formoterol Fumar (Dulera 100 Mcg/5 Mcg Inhaler)  2 puff INH 

BID-RT [facility]


   Last Admin: 12/06/18 06:25 Dose:  2 puff


Ondansetron HCl (Zofran Odt)  4 mg PO Q6H PRN


   PRN Reason: Nausea/Vomiting


Ondansetron HCl (Zofran)  4 mg IVP Q6H PRN


   PRN Reason: Nausea/Vomiting


Prednisone (Prednisone)  10 mg PO QAM-[facility]


   Stop: 12/08/18 08:01


   Last Admin: 12/06/18 08:44 Dose:  10 mg


Senna/Docusate Sodium (Senokot S)  2 tab PO BIDPRN PRN


   PRN Reason: Constipation


Simethicone (Mylicon Chewable)  240 mg PO QID [facility]


   Last Admin: 12/06/18 08:44 Dose:  240 mg


Sodium Chloride (Flush - Normal Saline)  10 ml IVF Q12HR [facility]


   Last Admin: 12/06/18 08:47 Dose:  10 ml


Sodium Chloride (Flush - Normal Saline)  10 ml IVF PRN PRN


   PRN Reason: Saline Flush


   Last Admin: 12/02/18 03:34 Dose:  10 ml


Sodium Chloride (Param-128 5% Oph Sol)  1 drop R EYE QID [facility]


   Last Admin: 12/06/18 08:46 Dose:  1 drop


Trazodone HCl (Desyrel)  50 mg PO HS PRN


   PRN Reason: Insomnia


Zolpidem Tartrate (Ambien)  5 mg PO HSPRN PRN


   PRN Reason: Insomnia

## 2018-12-07 RX ADMIN — BRIMONIDINE TARTRATE SCH DROP: 2 SOLUTION OPHTHALMIC at 09:29

## 2018-12-07 RX ADMIN — Medication SCH ML: at 09:30

## 2018-12-07 RX ADMIN — LACTOBACILLUS ACIDOPH-L.BULGARICUS 1 MILLION CELL CHEWABLE TABLET SCH TAB: at 09:27

## 2018-12-07 RX ADMIN — SODIUM CHLORIDE SCH DROP: 50 SOLUTION/ DROPS OPHTHALMIC at 09:24

## 2018-12-07 RX ADMIN — Medication SCH ML: at 20:59

## 2018-12-07 RX ADMIN — MOMETASONE FUROATE AND FORMOTEROL FUMARATE DIHYDRATE SCH PUFF: 100; 5 AEROSOL RESPIRATORY (INHALATION) at 07:36

## 2018-12-07 RX ADMIN — SODIUM CHLORIDE SCH DROP: 50 SOLUTION/ DROPS OPHTHALMIC at 12:08

## 2018-12-07 RX ADMIN — AMOXICILLIN AND CLAVULANATE POTASSIUM SCH MG: 875; 125 TABLET, FILM COATED ORAL at 09:27

## 2018-12-07 RX ADMIN — BRIMONIDINE TARTRATE SCH DROP: 2 SOLUTION OPHTHALMIC at 16:06

## 2018-12-07 RX ADMIN — SIMETHICONE SCH MG: 80 TABLET, CHEWABLE ORAL at 12:18

## 2018-12-07 RX ADMIN — SIMETHICONE SCH MG: 80 TABLET, CHEWABLE ORAL at 09:25

## 2018-12-07 RX ADMIN — MOMETASONE FUROATE AND FORMOTEROL FUMARATE DIHYDRATE SCH PUFF: 100; 5 AEROSOL RESPIRATORY (INHALATION) at 19:08

## 2018-12-07 RX ADMIN — SODIUM CHLORIDE SCH DROP: 50 SOLUTION/ DROPS OPHTHALMIC at 20:54

## 2018-12-07 RX ADMIN — BRIMONIDINE TARTRATE SCH DROP: 2 SOLUTION OPHTHALMIC at 20:55

## 2018-12-07 RX ADMIN — SIMETHICONE SCH MG: 80 TABLET, CHEWABLE ORAL at 20:57

## 2018-12-07 RX ADMIN — AMOXICILLIN AND CLAVULANATE POTASSIUM SCH MG: 875; 125 TABLET, FILM COATED ORAL at 20:56

## 2018-12-07 RX ADMIN — SODIUM CHLORIDE SCH DROP: 50 SOLUTION/ DROPS OPHTHALMIC at 16:03

## 2018-12-07 RX ADMIN — SIMETHICONE SCH MG: 80 TABLET, CHEWABLE ORAL at 17:37

## 2018-12-07 NOTE — PDOC.PN
- Subjective


Encounter Start Date: 12/07/18


Encounter Start Time: 09:30





Patient seen and examined. No new complaints. No overnight events





- Objective


Resuscitation Status - Order Detail:





12/02/18 00:07


Resuscitation Status Routine 


   Resuscitation Status: DNAR: NO Resuscitation


   Discussed with: patient








MAR Reviewed: Yes


Vital Signs & Weight: 


 Vital Signs (12 hours)











  Temp Pulse Resp BP Pulse Ox


 


 12/07/18 08:00  98.2 F  93  18  118/88 


 


 12/07/18 04:51  97.7 F  91  16  134/84  97


 


 12/07/18 01:20  97.9 F  96  16  125/85  95








 Weight











Admit Weight                   302 lb


 


Weight                         302 lb














I&O: 


 











 12/06/18 12/07/18 12/08/18





 06:59 06:59 06:59


 


Intake Total 3420 2210 


 


Output Total 3400 4210 


 


Balance 20 -2000 











Result Diagrams: 


 12/06/18 08:17





 12/06/18 08:17





Phys Exam





- Physical Examination


Constitutional: NAD


HEENT: PERRLA, moist MMs, sclera anicteric


Neck: no JVD, supple


Respiratory: no wheezing, no rales, no rhonchi


Cardiovascular: RRR, no significant murmur, no rub


Gastrointestinal: soft, non-tender, no distention


Musculoskeletal: no edema, pulses present


Lymphatic: no nodes


Psychiatric: normal affect


Skin: no rash, normal turgor





Dx/Plan


(1) Complicated UTI (urinary tract infection)


Code(s): N39.0 - URINARY TRACT INFECTION, SITE NOT SPECIFIED   Status: Acute   

Comment: related with indwelling rothman catheter   





(2) Hypokalemia


Code(s): E87.6 - HYPOKALEMIA   Status: Acute   





(3) Lactic acidosis


Code(s): E87.2 - ACIDOSIS   Status: Acute   





(4) Sepsis


Code(s): A41.9 - SEPSIS, UNSPECIFIED ORGANISM   Status: Acute   





(5) Colostomy in place


Code(s): Z93.3 - COLOSTOMY STATUS   Status: Chronic   





(6) HTN (hypertension)


Code(s): I10 - ESSENTIAL (PRIMARY) HYPERTENSION   Status: Chronic   





(7) Morbid obesity


Code(s): E66.01 - MORBID (SEVERE) OBESITY DUE TO EXCESS CALORIES   Status: 

Chronic   Comment: with BMI 43   





(8) Multiple sclerosis, secondary progressive


Code(s): G35 - MULTIPLE SCLEROSIS   Status: Chronic   





(9) Ulcerative colitis


Code(s): K51.90 - ULCERATIVE COLITIS, UNSPECIFIED, WITHOUT COMPLICATIONS   

Status: Chronic   





(10) Neurogenic bladder


Code(s): N31.9 - NEUROMUSCULAR DYSFUNCTION OF BLADDER, UNSPECIFIED   Status: 

Chronic   





- Plan


cont current plan of care, continue antibiotics, PT/OT, 





* medication reviewed as below


* symptomatic treatment


* stable medically


* await final discharge disposition decision as rehab declined and she does not 

want to go to [facility].








Review of Systems





- Review of Systems


ENT: negative: Ear Pain, Ear Discharge, Nose Pain, Nose Discharge, Nose 

Congestion, Mouth Pain, Mouth Swelling, Throat Pain, Throat Swelling, Other


Respiratory: negative: Cough, Dry, Shortness of Breath, Hemoptysis, SOB with 

Excertion, Pleuritic Pain, Sputum, Wheezing


Cardiovascular: negative: chest pain, palpitations, orthopnea, paroxysmal 

nocturnal dyspnea, edema, light headedness, other


Gastrointestinal: negative: Nausea, Vomiting, Abdominal Pain, Diarrhea, 

Constipation, Melena, Hematochezia, Other


Genitourinary: negative: Dysuria, Frequency, Incontinence, Hematuria, Retention

, Other


Musculoskeletal: negative: Neck Pain, Shoulder Pain, Arm Pain, Back Pain, Hand 

Pain, Leg Pain, Foot Pain, Other





- Medications/Allergies


Allergies/Adverse Reactions: 


 Allergies











Allergy/AdvReac Type Severity Reaction Status Date / Time


 


clarithromycin [From Biaxin] Allergy  Hives Verified 12/02/18 00:44











Medications: 


 Current Medications





Acetaminophen (Tylenol)  650 mg PO Q4H PRN


   PRN Reason: Headache/Fever/Mild Pain (1-3)


Hydrocodone Bitart/Acetaminophen (Norco 5/325)  1 tab PO Q4H PRN


   PRN Reason: Moderate Pain (4-6)


Hydrocodone Bitart/Acetaminophen (Norco 7.5/325)  1 tab PO Q4H PRN


   PRN Reason: Severe Pain (7-10)


Acidophilus (Floranex)  1 tab PO DAILY [facility]


   Last Admin: 12/07/18 09:27 Dose:  1 tab


Amoxicillin/Clavulanate Potassium (Augmentin)  875 mg PO BID [facility]


   Last Admin: 12/07/18 09:27 Dose:  875 mg


Baclofen (Lioresal)  15 mg PO 0600,2100 [facility]


   Last Admin: 12/07/18 05:44 Dose:  15 mg


Baclofen (Lioresal)  5 mg PO 1100,1600 [facility]


   Last Admin: 12/06/18 16:50 Dose:  5 mg


Bisacodyl (Dulcolax)  10 mg PO DAILYPRN PRN


   PRN Reason: Constipation


   Last Admin: 12/02/18 00:54 Dose:  10 mg


Brimonidine Tartrate (Alphagan 0.2% Ophth Soln)  1 drop R EYE TID [facility]


   Last Admin: 12/07/18 09:29 Dose:  1 drop


Cholecalciferol (Vitamin D3)  5,000 units PO DAILY [facility]


   Last Admin: 12/07/18 09:26 Dose:  5,000 units


Cyclobenzaprine HCl (Flexeril)  10 mg PO TID [facility]


   Last Admin: 12/07/18 09:26 Dose:  10 mg


Docusate Sodium (Colace)  100 mg PO HS [facility]


   Last Admin: 12/06/18 20:58 Dose:  100 mg


Enoxaparin Sodium (Lovenox)  40 mg SC 0900 [facility]


   Last Admin: 12/07/18 09:28 Dose:  40 mg


Famotidine (Pepcid)  20 mg PO BID [facility]


   Last Admin: 12/07/18 09:28 Dose:  20 mg


Fluoxetine HCl (Prozac)  20 mg PO BID [facility]


   Last Admin: 12/07/18 09:26 Dose:  20 mg


Gabapentin (Neurontin)  800 mg PO QID [facility]


   Last Admin: 12/07/18 09:27 Dose:  800 mg


Guaifenesin/Dextromethorphan (Robitussin Dm)  15 ml PO Q4H PRN


   PRN Reason: Cough


Hyoscyamine Sulfate (Levsin)  0.25 mg PO QID [facility]


   Last Admin: 12/07/18 09:29 Dose:  0.25 mg


Ibuprofen (Motrin)  800 mg PO Q6H PRN


   PRN Reason: Fever>101/(Mi/Mod/Sev) Pain


   Last Admin: 12/05/18 21:27 Dose:  800 mg


Latanoprost (Xalatan 0.005% Ophth Soln)  1 drop R EYE HS [facility]


   Last Admin: 12/06/18 20:56 Dose:  1 drop


Mesalamine (Canasa)  1,000 mg MA HSPRN PRN


   PRN Reason: Bleeding


Mometasone Furoate/Formoterol Fumar (Dulera 100 Mcg/5 Mcg Inhaler)  2 puff INH 

BID-RT [facility]


   Last Admin: 12/07/18 07:36 Dose:  2 puff


Ondansetron HCl (Zofran Odt)  4 mg PO Q6H PRN


   PRN Reason: Nausea/Vomiting


Ondansetron HCl (Zofran)  4 mg IVP Q6H PRN


   PRN Reason: Nausea/Vomiting


Prednisone (Prednisone)  10 mg PO QAM-WM [facility]


   Stop: 12/08/18 08:01


   Last Admin: 12/07/18 09:27 Dose:  10 mg


Senna/Docusate Sodium (Senokot S)  2 tab PO BIDPRN PRN


   PRN Reason: Constipation


Simethicone (Mylicon Chewable)  240 mg PO QID [facility]


   Last Admin: 12/07/18 09:25 Dose:  240 mg


Sodium Chloride (Flush - Normal Saline)  10 ml IVF Q12HR [facility]


   Last Admin: 12/07/18 09:30 Dose:  10 ml


Sodium Chloride (Flush - Normal Saline)  10 ml IVF PRN PRN


   PRN Reason: Saline Flush


   Last Admin: 12/02/18 03:34 Dose:  10 ml


Sodium Chloride (Param-128 5% Oph Sol)  1 drop R EYE QID [facility]


   Last Admin: 12/07/18 09:24 Dose:  1 drop


Trazodone HCl (Desyrel)  50 mg PO HS PRN


   PRN Reason: Insomnia


Zolpidem Tartrate (Ambien)  5 mg PO HSPRN PRN


   PRN Reason: Insomnia

## 2018-12-08 RX ADMIN — SIMETHICONE SCH MG: 80 TABLET, CHEWABLE ORAL at 20:26

## 2018-12-08 RX ADMIN — SODIUM CHLORIDE SCH DROP: 50 SOLUTION/ DROPS OPHTHALMIC at 08:46

## 2018-12-08 RX ADMIN — MOMETASONE FUROATE AND FORMOTEROL FUMARATE DIHYDRATE SCH PUFF: 100; 5 AEROSOL RESPIRATORY (INHALATION) at 17:58

## 2018-12-08 RX ADMIN — SODIUM CHLORIDE SCH DROP: 50 SOLUTION/ DROPS OPHTHALMIC at 20:24

## 2018-12-08 RX ADMIN — LACTOBACILLUS ACIDOPH-L.BULGARICUS 1 MILLION CELL CHEWABLE TABLET SCH TAB: at 08:48

## 2018-12-08 RX ADMIN — Medication SCH ML: at 20:28

## 2018-12-08 RX ADMIN — BRIMONIDINE TARTRATE SCH DROP: 2 SOLUTION OPHTHALMIC at 16:22

## 2018-12-08 RX ADMIN — SIMETHICONE SCH MG: 80 TABLET, CHEWABLE ORAL at 08:51

## 2018-12-08 RX ADMIN — SODIUM CHLORIDE SCH DROP: 50 SOLUTION/ DROPS OPHTHALMIC at 13:18

## 2018-12-08 RX ADMIN — Medication SCH ML: at 08:53

## 2018-12-08 RX ADMIN — AMOXICILLIN AND CLAVULANATE POTASSIUM SCH MG: 875; 125 TABLET, FILM COATED ORAL at 08:48

## 2018-12-08 RX ADMIN — BRIMONIDINE TARTRATE SCH DROP: 2 SOLUTION OPHTHALMIC at 08:55

## 2018-12-08 RX ADMIN — AMOXICILLIN AND CLAVULANATE POTASSIUM SCH MG: 875; 125 TABLET, FILM COATED ORAL at 20:26

## 2018-12-08 RX ADMIN — BRIMONIDINE TARTRATE SCH DROP: 2 SOLUTION OPHTHALMIC at 20:25

## 2018-12-08 RX ADMIN — SODIUM CHLORIDE SCH: 50 SOLUTION/ DROPS OPHTHALMIC at 16:58

## 2018-12-08 RX ADMIN — SIMETHICONE SCH MG: 80 TABLET, CHEWABLE ORAL at 13:18

## 2018-12-08 RX ADMIN — MOMETASONE FUROATE AND FORMOTEROL FUMARATE DIHYDRATE SCH PUFF: 100; 5 AEROSOL RESPIRATORY (INHALATION) at 06:59

## 2018-12-08 RX ADMIN — SIMETHICONE SCH MG: 80 TABLET, CHEWABLE ORAL at 16:26

## 2018-12-08 NOTE — PDOC.PN
- Subjective


Encounter Start Date: 12/08/18


Encounter Start Time: 09:30





Patient seen and examined. No new complaints. No overnight events





- Objective


Resuscitation Status - Order Detail:





12/02/18 00:07


Resuscitation Status Routine 


   Resuscitation Status: DNAR: NO Resuscitation


   Discussed with: patient








MAR Reviewed: Yes


Vital Signs & Weight: 


 Vital Signs (12 hours)











  Temp Pulse Resp BP BP Pulse Ox


 


 12/08/18 08:00  97.8 F  98  18  104/71   97


 


 12/08/18 06:59   93  20    99


 


 12/08/18 04:00  97.9 F  89  20   122/84  95


 


 12/08/18 00:29  98.1 F  97  20   120/85  95








 Weight











Admit Weight                   302 lb


 


Weight                         302 lb














I&O: 


 











 12/07/18 12/08/18 12/09/18





 06:59 06:59 06:59


 


Intake Total 2210 2970 


 


Output Total 4210 3600 


 


Balance -2000 -630 











Result Diagrams: 


 12/06/18 08:17





 12/06/18 08:17





Phys Exam





- Physical Examination


Constitutional: NAD


HEENT: PERRLA, moist MMs, sclera anicteric


Neck: no JVD, supple


Respiratory: no wheezing, no rales, no rhonchi


Cardiovascular: RRR, no significant murmur, no rub


Gastrointestinal: soft, non-tender, no distention


Musculoskeletal: no edema, pulses present


Lymphatic: no nodes


Psychiatric: normal affect, A&O x 3


Skin: no rash, normal turgor





Dx/Plan


(1) Complicated UTI (urinary tract infection)


Code(s): N39.0 - URINARY TRACT INFECTION, SITE NOT SPECIFIED   Status: Acute   

Comment: related with indwelling rothman catheter   





(2) Hypokalemia


Code(s): E87.6 - HYPOKALEMIA   Status: Acute   





(3) Lactic acidosis


Code(s): E87.2 - ACIDOSIS   Status: Acute   





(4) Sepsis


Code(s): A41.9 - SEPSIS, UNSPECIFIED ORGANISM   Status: Acute   





(5) Colostomy in place


Code(s): Z93.3 - COLOSTOMY STATUS   Status: Chronic   





(6) HTN (hypertension)


Code(s): I10 - ESSENTIAL (PRIMARY) HYPERTENSION   Status: Chronic   





(7) Morbid obesity


Code(s): E66.01 - MORBID (SEVERE) OBESITY DUE TO EXCESS CALORIES   Status: 

Chronic   Comment: with BMI 43   





(8) Multiple sclerosis, secondary progressive


Code(s): G35 - MULTIPLE SCLEROSIS   Status: Chronic   





(9) Ulcerative colitis


Code(s): K51.90 - ULCERATIVE COLITIS, UNSPECIFIED, WITHOUT COMPLICATIONS   

Status: Chronic   





(10) Neurogenic bladder


Code(s): N31.9 - NEUROMUSCULAR DYSFUNCTION OF BLADDER, UNSPECIFIED   Status: 

Chronic   





- Plan


cont current plan of care, continue antibiotics, 





* medication reviewed as below


* symptomatic treatment


* continue augmentin


* await discharge placement.








Review of Systems





- Review of Systems


ENT: negative: Ear Pain, Ear Discharge, Nose Pain, Nose Discharge, Nose 

Congestion, Mouth Pain, Mouth Swelling, Throat Pain, Throat Swelling, Other


Respiratory: negative: Cough, Dry, Shortness of Breath, Hemoptysis, SOB with 

Excertion, Pleuritic Pain, Sputum, Wheezing


Cardiovascular: negative: chest pain, palpitations, orthopnea, paroxysmal 

nocturnal dyspnea, edema, light headedness, other


Gastrointestinal: negative: Nausea, Vomiting, Abdominal Pain, Diarrhea, 

Constipation, Melena, Hematochezia, Other


Genitourinary: negative: Dysuria, Frequency, Incontinence, Hematuria, Retention

, Other


Musculoskeletal: negative: Neck Pain, Shoulder Pain, Arm Pain, Back Pain, Hand 

Pain, Leg Pain, Foot Pain, Other





- Medications/Allergies


Allergies/Adverse Reactions: 


 Allergies











Allergy/AdvReac Type Severity Reaction Status Date / Time


 


clarithromycin [From Biaxin] Allergy  Hives Verified 12/02/18 00:44











Medications: 


 Current Medications





Acetaminophen (Tylenol)  650 mg PO Q4H PRN


   PRN Reason: Headache/Fever/Mild Pain (1-3)


Hydrocodone Bitart/Acetaminophen (Norco 5/325)  1 tab PO Q4H PRN


   PRN Reason: Moderate Pain (4-6)


Hydrocodone Bitart/Acetaminophen (Norco 7.5/325)  1 tab PO Q4H PRN


   PRN Reason: Severe Pain (7-10)


Acidophilus (Floranex)  1 tab PO DAILY [facility]


   Last Admin: 12/08/18 08:48 Dose:  1 tab


Amoxicillin/Clavulanate Potassium (Augmentin)  875 mg PO BID [facility]


   Last Admin: 12/08/18 08:48 Dose:  875 mg


Baclofen (Lioresal)  15 mg PO 0600,2100 [facility]


   Last Admin: 12/08/18 05:21 Dose:  15 mg


Baclofen (Lioresal)  5 mg PO 1100,1600 [facility]


   Last Admin: 12/08/18 10:34 Dose:  5 mg


Bisacodyl (Dulcolax)  10 mg PO DAILYPRN PRN


   PRN Reason: Constipation


   Last Admin: 12/02/18 00:54 Dose:  10 mg


Brimonidine Tartrate (Alphagan 0.2% Ophth Soln)  1 drop R EYE TID [facility]


   Last Admin: 12/08/18 08:55 Dose:  1 drop


Cholecalciferol (Vitamin D3)  5,000 units PO DAILY [facility]


   Last Admin: 12/08/18 08:49 Dose:  5,000 units


Cyclobenzaprine HCl (Flexeril)  10 mg PO TID [facility]


   Last Admin: 12/08/18 08:48 Dose:  10 mg


Docusate Sodium (Colace)  100 mg PO HS [facility]


   Last Admin: 12/07/18 20:57 Dose:  100 mg


Enoxaparin Sodium (Lovenox)  40 mg SC 0900 [facility]


   Last Admin: 12/08/18 08:50 Dose:  40 mg


Famotidine (Pepcid)  20 mg PO BID [facility]


   Last Admin: 12/08/18 08:49 Dose:  20 mg


Fluoxetine HCl (Prozac)  20 mg PO BID [facility]


   Last Admin: 12/08/18 08:49 Dose:  20 mg


Gabapentin (Neurontin)  800 mg PO QID [facility]


   Last Admin: 12/08/18 08:50 Dose:  800 mg


Guaifenesin/Dextromethorphan (Robitussin Dm)  15 ml PO Q4H PRN


   PRN Reason: Cough


Hyoscyamine Sulfate (Levsin)  0.25 mg PO QID [facility]


   Last Admin: 12/08/18 08:52 Dose:  0.25 mg


Ibuprofen (Motrin)  800 mg PO Q6H PRN


   PRN Reason: Fever>101/(Mi/Mod/Sev) Pain


   Last Admin: 12/05/18 21:27 Dose:  800 mg


Latanoprost (Xalatan 0.005% Ophth Soln)  1 drop R EYE HS [facility]


   Last Admin: 12/07/18 20:56 Dose:  1 drop


Mesalamine (Canasa)  1,000 mg [place] HSPRN PRN


   PRN Reason: Bleeding


   Last Admin: 12/07/18 11:59 Dose:  1,000 mg


Mometasone Furoate/Formoterol Fumar (Dulera 100 Mcg/5 Mcg Inhaler)  2 puff INH 

BID-RT [facility]


   Last Admin: 12/08/18 06:59 Dose:  2 puff


Ondansetron HCl (Zofran Odt)  4 mg PO Q6H PRN


   PRN Reason: Nausea/Vomiting


Ondansetron HCl (Zofran)  4 mg IVP Q6H PRN


   PRN Reason: Nausea/Vomiting


Senna/Docusate Sodium (Senokot S)  2 tab PO BIDPRN PRN


   PRN Reason: Constipation


Simethicone (Mylicon Chewable)  240 mg PO QID [facility]


   Last Admin: 12/08/18 08:51 Dose:  240 mg


Sodium Chloride (Flush - Normal Saline)  10 ml IVF Q12HR [facility]


   Last Admin: 12/08/18 08:53 Dose:  10 ml


Sodium Chloride (Flush - Normal Saline)  10 ml IVF PRN PRN


   PRN Reason: Saline Flush


   Last Admin: 12/02/18 03:34 Dose:  10 ml


Sodium Chloride (Param-128 5% Oph Sol)  1 drop R EYE QID [facility]


   Last Admin: 12/08/18 08:46 Dose:  1 drop


Trazodone HCl (Desyrel)  50 mg PO HS PRN


   PRN Reason: Insomnia


Zolpidem Tartrate (Ambien)  5 mg PO HSPRN PRN


   PRN Reason: Insomnia

## 2018-12-08 NOTE — EKG
Test Reason : 

Blood Pressure : ***/*** mmHG

Vent. Rate : 116 BPM     Atrial Rate : 116 BPM

   P-R Int : 146 ms          QRS Dur : 088 ms

    QT Int : 344 ms       P-R-T Axes : 028 002 000 degrees

   QTc Int : 478 ms

 

Sinus tachycardia

Otherwise normal EKG

Confirmed by NESTOR ALFONSO DO ([id number]),  LIZBETH JASMINE ([id number]) on 12/8/2018 12:12:02 PM

 

Referred By:  KADEN           Confirmed By:NESTOR ALFONSO DO

## 2018-12-09 RX ADMIN — LACTOBACILLUS ACIDOPH-L.BULGARICUS 1 MILLION CELL CHEWABLE TABLET SCH TAB: at 09:19

## 2018-12-09 RX ADMIN — SIMETHICONE SCH MG: 80 TABLET, CHEWABLE ORAL at 13:28

## 2018-12-09 RX ADMIN — AMOXICILLIN AND CLAVULANATE POTASSIUM SCH MG: 875; 125 TABLET, FILM COATED ORAL at 09:19

## 2018-12-09 RX ADMIN — Medication SCH ML: at 09:26

## 2018-12-09 RX ADMIN — Medication SCH: at 21:45

## 2018-12-09 RX ADMIN — SIMETHICONE SCH MG: 80 TABLET, CHEWABLE ORAL at 21:41

## 2018-12-09 RX ADMIN — SODIUM CHLORIDE SCH DROP: 50 SOLUTION/ DROPS OPHTHALMIC at 13:26

## 2018-12-09 RX ADMIN — BRIMONIDINE TARTRATE SCH DROP: 2 SOLUTION OPHTHALMIC at 16:12

## 2018-12-09 RX ADMIN — SIMETHICONE SCH MG: 80 TABLET, CHEWABLE ORAL at 09:20

## 2018-12-09 RX ADMIN — AMOXICILLIN AND CLAVULANATE POTASSIUM SCH MG: 875; 125 TABLET, FILM COATED ORAL at 21:42

## 2018-12-09 RX ADMIN — MOMETASONE FUROATE AND FORMOTEROL FUMARATE DIHYDRATE SCH PUFF: 100; 5 AEROSOL RESPIRATORY (INHALATION) at 12:20

## 2018-12-09 RX ADMIN — SODIUM CHLORIDE SCH DROP: 50 SOLUTION/ DROPS OPHTHALMIC at 16:08

## 2018-12-09 RX ADMIN — SODIUM CHLORIDE SCH DROP: 50 SOLUTION/ DROPS OPHTHALMIC at 09:17

## 2018-12-09 RX ADMIN — BRIMONIDINE TARTRATE SCH DROP: 2 SOLUTION OPHTHALMIC at 13:29

## 2018-12-09 RX ADMIN — MOMETASONE FUROATE AND FORMOTEROL FUMARATE DIHYDRATE SCH PUFF: 100; 5 AEROSOL RESPIRATORY (INHALATION) at 18:11

## 2018-12-09 RX ADMIN — SODIUM CHLORIDE SCH DROP: 50 SOLUTION/ DROPS OPHTHALMIC at 21:39

## 2018-12-09 RX ADMIN — SIMETHICONE SCH MG: 80 TABLET, CHEWABLE ORAL at 16:13

## 2018-12-09 RX ADMIN — BRIMONIDINE TARTRATE SCH DROP: 2 SOLUTION OPHTHALMIC at 09:25

## 2018-12-09 NOTE — PDOC.PN
- Subjective


Encounter Start Date: 12/09/18


Encounter Start Time: 07:10





Patient seen and examined. No new complaints. No overnight events





- Objective


Resuscitation Status - Order Detail:





12/02/18 00:07


Resuscitation Status Routine 


   Resuscitation Status: DNAR: NO Resuscitation


   Discussed with: patient








MAR Reviewed: Yes


Vital Signs & Weight: 


 Vital Signs (12 hours)











  Temp Pulse Resp BP Pulse Ox


 


 12/09/18 08:00  97.9 F  86  18  130/83  97


 


 12/09/18 04:00  98.2 F  87  16  123/83  96


 


 12/09/18 00:00  97.7 F  90  16  108/78  95








 Weight











Admit Weight                   302 lb


 


Weight                         302 lb














I&O: 


 











 12/08/18 12/09/18 12/10/18





 06:59 06:59 06:59


 


Intake Total 2970 4560 


 


Output Total 3600 3900 


 


Balance -630 660 











Result Diagrams: 


 12/06/18 08:17





 12/06/18 08:17





Phys Exam





- Physical Examination


Constitutional: NAD


HEENT: PERRLA, moist MMs, sclera anicteric


Neck: no JVD, supple


Respiratory: no wheezing, no rales, no rhonchi


Cardiovascular: RRR, no significant murmur, no rub


Gastrointestinal: soft, non-tender, no distention


rothman+


Musculoskeletal: no edema, pulses present


left heel with dressing


Lymphatic: no nodes


Psychiatric: normal affect, A&O x 3


Skin: no rash, normal turgor





Dx/Plan


(1) Complicated UTI (urinary tract infection)


Code(s): N39.0 - URINARY TRACT INFECTION, SITE NOT SPECIFIED   Status: Acute   

Comment: related with indwelling rothman catheter   





(2) Hypokalemia


Code(s): E87.6 - HYPOKALEMIA   Status: Acute   





(3) Lactic acidosis


Code(s): E87.2 - ACIDOSIS   Status: Acute   





(4) Sepsis


Code(s): A41.9 - SEPSIS, UNSPECIFIED ORGANISM   Status: Acute   





(5) Colostomy in place


Code(s): Z93.3 - COLOSTOMY STATUS   Status: Chronic   





(6) HTN (hypertension)


Code(s): I10 - ESSENTIAL (PRIMARY) HYPERTENSION   Status: Chronic   





(7) Morbid obesity


Code(s): E66.01 - MORBID (SEVERE) OBESITY DUE TO EXCESS CALORIES   Status: 

Chronic   Comment: with BMI 43   





(8) Multiple sclerosis, secondary progressive


Code(s): G35 - MULTIPLE SCLEROSIS   Status: Chronic   





(9) Ulcerative colitis


Code(s): K51.90 - ULCERATIVE COLITIS, UNSPECIFIED, WITHOUT COMPLICATIONS   

Status: Chronic   





(10) Neurogenic bladder


Code(s): N31.9 - NEUROMUSCULAR DYSFUNCTION OF BLADDER, UNSPECIFIED   Status: 

Chronic   





(11) Functional quadriplegia secondary to MS


Code(s): G35 - MULTIPLE SCLEROSIS; R53.2 - FUNCTIONAL QUADRIPLEGIA   Status: 

Chronic   





(12) Pressure ulcer, heel, left, unstageable


Code(s): L89.620 - PRESSURE ULCER OF LEFT HEEL, UNSTAGEABLE   Status: Acute   

Comment: present on admission   





- Plan


cont current plan of care, continue antibiotics





* continue augmentin


* pt is not feeling safe to go home, she prefer to go to rehab at [facility], await 

approval


* medication reviewed as below


* symptomatic treatment


* wound care.








Review of Systems





- Review of Systems


Eyes: negative: Pain, Vision Change, Conjunctivae Inflammation, Eyelid 

Inflammation, Redness, Other


ENT: negative: Ear Pain, Ear Discharge, Nose Pain, Nose Discharge, Nose 

Congestion, Mouth Pain, Mouth Swelling, Throat Pain, Throat Swelling, Other


Respiratory: negative: Cough, Dry, Shortness of Breath, Hemoptysis, SOB with 

Excertion, Pleuritic Pain, Sputum, Wheezing


Cardiovascular: negative: chest pain, palpitations, orthopnea, paroxysmal 

nocturnal dyspnea, edema, light headedness, other


Gastrointestinal: negative: Nausea, Vomiting, Abdominal Pain, Diarrhea, 

Constipation, Melena, Hematochezia, Other


Genitourinary: negative: Dysuria, Frequency, Incontinence, Hematuria, Retention

, Other


Musculoskeletal: negative: Neck Pain, Shoulder Pain, Arm Pain, Back Pain, Hand 

Pain, Leg Pain, Foot Pain, Other


Skin: negative: Rash, Lesions, Giovany, Bruising, Other





- Medications/Allergies


Allergies/Adverse Reactions: 


 Allergies











Allergy/AdvReac Type Severity Reaction Status Date / Time


 


clarithromycin [From Biaxin] Allergy  Hives Verified 12/02/18 00:44











Medications: 


 Current Medications





Acetaminophen (Tylenol)  650 mg PO Q4H PRN


   PRN Reason: Headache/Fever/Mild Pain (1-3)


Hydrocodone Bitart/Acetaminophen (Norco 5/325)  1 tab PO Q4H PRN


   PRN Reason: Moderate Pain (4-6)


Hydrocodone Bitart/Acetaminophen (Norco 7.5/325)  1 tab PO Q4H PRN


   PRN Reason: Severe Pain (7-10)


Acidophilus (Floranex)  1 tab PO DAILY [facility]


   Last Admin: 12/08/18 08:48 Dose:  1 tab


Amoxicillin/Clavulanate Potassium (Augmentin)  875 mg PO BID [facility]


   Last Admin: 12/08/18 20:26 Dose:  875 mg


Baclofen (Lioresal)  15 mg PO 0600,2100 [facility]


   Last Admin: 12/09/18 05:08 Dose:  15 mg


Baclofen (Lioresal)  5 mg PO 1100,1600 [facility]


   Last Admin: 12/08/18 16:24 Dose:  5 mg


Bisacodyl (Dulcolax)  10 mg PO DAILYPRN PRN


   PRN Reason: Constipation


   Last Admin: 12/02/18 00:54 Dose:  10 mg


Brimonidine Tartrate (Alphagan 0.2% Ophth Soln)  1 drop R EYE TID [facility]


   Last Admin: 12/08/18 20:25 Dose:  1 drop


Cholecalciferol (Vitamin D3)  5,000 units PO DAILY [facility]


   Last Admin: 12/08/18 08:49 Dose:  5,000 units


Cyclobenzaprine HCl (Flexeril)  10 mg PO TID [facility]


   Last Admin: 12/08/18 20:26 Dose:  10 mg


Docusate Sodium (Colace)  100 mg PO HS [facility]


   Last Admin: 12/08/18 20:26 Dose:  100 mg


Enoxaparin Sodium (Lovenox)  40 mg SC 0900 [facility]


   Last Admin: 12/08/18 08:50 Dose:  40 mg


Famotidine (Pepcid)  20 mg PO BID [facility]


   Last Admin: 12/08/18 20:27 Dose:  20 mg


Fluoxetine HCl (Prozac)  20 mg PO BID [facility]


   Last Admin: 12/08/18 20:27 Dose:  20 mg


Gabapentin (Neurontin)  800 mg PO QID [facility]


   Last Admin: 12/08/18 20:27 Dose:  800 mg


Guaifenesin/Dextromethorphan (Robitussin Dm)  15 ml PO Q4H PRN


   PRN Reason: Cough


Hyoscyamine Sulfate (Levsin)  0.25 mg PO QID [facility]


   Last Admin: 12/08/18 20:22 Dose:  0.25 mg


Ibuprofen (Motrin)  800 mg PO Q6H PRN


   PRN Reason: Fever>101/(Mi/Mod/Sev) Pain


   Last Admin: 12/05/18 21:27 Dose:  800 mg


Latanoprost (Xalatan 0.005% Ophth Soln)  1 drop R EYE HS [facility]


   Last Admin: 12/08/18 20:26 Dose:  1 drop


Mesalamine (Canasa)  1,000 mg [place] HSPRN PRN


   PRN Reason: Bleeding


   Last Admin: 12/07/18 11:59 Dose:  1,000 mg


Mometasone Furoate/Formoterol Fumar (Dulera 100 Mcg/5 Mcg Inhaler)  2 puff INH 

BID-RT [facility]


   Last Admin: 12/08/18 17:58 Dose:  2 puff


Ondansetron HCl (Zofran Odt)  4 mg PO Q6H PRN


   PRN Reason: Nausea/Vomiting


Ondansetron HCl (Zofran)  4 mg IVP Q6H PRN


   PRN Reason: Nausea/Vomiting


Senna/Docusate Sodium (Senokot S)  2 tab PO BIDPRN PRN


   PRN Reason: Constipation


Simethicone (Mylicon Chewable)  240 mg PO QID [facility]


   Last Admin: 12/08/18 20:26 Dose:  240 mg


Sodium Chloride (Flush - Normal Saline)  10 ml IVF Q12HR [facility]


   Last Admin: 12/08/18 20:28 Dose:  10 ml


Sodium Chloride (Flush - Normal Saline)  10 ml IVF PRN PRN


   PRN Reason: Saline Flush


   Last Admin: 12/02/18 03:34 Dose:  10 ml


Sodium Chloride (Param-128 5% Oph Sol)  1 drop R EYE QID [facility]


   Last Admin: 12/08/18 20:24 Dose:  1 drop


Trazodone HCl (Desyrel)  50 mg PO HS PRN


   PRN Reason: Insomnia


Zolpidem Tartrate (Ambien)  5 mg PO HSPRN PRN


   PRN Reason: Insomnia

## 2018-12-10 VITALS — SYSTOLIC BLOOD PRESSURE: 108 MMHG | DIASTOLIC BLOOD PRESSURE: 73 MMHG | TEMPERATURE: 98.2 F

## 2018-12-10 RX ADMIN — SIMETHICONE SCH MG: 80 TABLET, CHEWABLE ORAL at 09:42

## 2018-12-10 RX ADMIN — LACTOBACILLUS ACIDOPH-L.BULGARICUS 1 MILLION CELL CHEWABLE TABLET SCH TAB: at 09:43

## 2018-12-10 RX ADMIN — SODIUM CHLORIDE SCH DROP: 50 SOLUTION/ DROPS OPHTHALMIC at 12:25

## 2018-12-10 RX ADMIN — SODIUM CHLORIDE SCH DROP: 50 SOLUTION/ DROPS OPHTHALMIC at 09:44

## 2018-12-10 RX ADMIN — SIMETHICONE SCH MG: 80 TABLET, CHEWABLE ORAL at 12:24

## 2018-12-10 RX ADMIN — BRIMONIDINE TARTRATE SCH DROP: 2 SOLUTION OPHTHALMIC at 16:15

## 2018-12-10 RX ADMIN — AMOXICILLIN AND CLAVULANATE POTASSIUM SCH MG: 875; 125 TABLET, FILM COATED ORAL at 09:44

## 2018-12-10 RX ADMIN — Medication SCH: at 09:45

## 2018-12-10 RX ADMIN — BRIMONIDINE TARTRATE SCH DROP: 2 SOLUTION OPHTHALMIC at 09:44

## 2018-12-10 RX ADMIN — SIMETHICONE SCH MG: 80 TABLET, CHEWABLE ORAL at 16:17

## 2018-12-10 RX ADMIN — SODIUM CHLORIDE SCH: 50 SOLUTION/ DROPS OPHTHALMIC at 16:21

## 2018-12-10 RX ADMIN — MOMETASONE FUROATE AND FORMOTEROL FUMARATE DIHYDRATE SCH PUFF: 100; 5 AEROSOL RESPIRATORY (INHALATION) at 06:55

## 2018-12-10 NOTE — PDOC.PN
- Subjective


Encounter Start Date: 12/10/18


Encounter Start Time: 08:00





Patient seen and examined. No new complaints. No overnight events





- Objective


Resuscitation Status - Order Detail:





12/02/18 00:07


Resuscitation Status Routine 


   Resuscitation Status: DNAR: NO Resuscitation


   Discussed with: patient








MAR Reviewed: Yes


Vital Signs & Weight: 


 Vital Signs (12 hours)











  Temp Pulse Resp BP Pulse Ox


 


 12/10/18 07:46  98.2 F  98  20  108/73  96


 


 12/10/18 06:55   92    97








 Weight











Admit Weight                   302 lb


 


Weight                         302 lb














I&O: 


 











 12/09/18 12/10/18 12/11/18





 06:59 06:59 06:59


 


Intake Total 4560 2810 


 


Output Total 3900 3400 


 


Balance 660 -590 











Result Diagrams: 


 12/06/18 08:17





 12/06/18 08:17





Phys Exam





- Physical Examination


Constitutional: NAD


HEENT: PERRLA, moist MMs, sclera anicteric


Neck: no JVD, supple


Respiratory: no wheezing, no rales, no rhonchi


Cardiovascular: RRR, no significant murmur, no rub


Gastrointestinal: soft, non-tender, no distention, positive bowel sounds


Musculoskeletal: no edema, pulses present


Lymphatic: no nodes


Psychiatric: normal affect, A&O x 3


Skin: no rash, normal turgor





Dx/Plan


(1) Complicated UTI (urinary tract infection)


Code(s): N39.0 - URINARY TRACT INFECTION, SITE NOT SPECIFIED   Status: Acute   

Comment: related with indwelling rothman catheter   





(2) Hypokalemia


Code(s): E87.6 - HYPOKALEMIA   Status: Acute   





(3) Lactic acidosis


Code(s): E87.2 - ACIDOSIS   Status: Acute   





(4) Sepsis


Code(s): A41.9 - SEPSIS, UNSPECIFIED ORGANISM   Status: Acute   





(5) Colostomy in place


Code(s): Z93.3 - COLOSTOMY STATUS   Status: Chronic   





(6) HTN (hypertension)


Code(s): I10 - ESSENTIAL (PRIMARY) HYPERTENSION   Status: Chronic   





(7) Morbid obesity


Code(s): E66.01 - MORBID (SEVERE) OBESITY DUE TO EXCESS CALORIES   Status: 

Chronic   Comment: with BMI 43   





(8) Multiple sclerosis, secondary progressive


Code(s): G35 - MULTIPLE SCLEROSIS   Status: Chronic   





(9) Ulcerative colitis


Code(s): K51.90 - ULCERATIVE COLITIS, UNSPECIFIED, WITHOUT COMPLICATIONS   

Status: Chronic   





(10) Neurogenic bladder


Code(s): N31.9 - NEUROMUSCULAR DYSFUNCTION OF BLADDER, UNSPECIFIED   Status: 

Chronic   





(11) Functional quadriplegia secondary to MS


Code(s): G35 - MULTIPLE SCLEROSIS; R53.2 - FUNCTIONAL QUADRIPLEGIA   Status: 

Chronic   





(12) Pressure ulcer, heel, left, unstageable


Code(s): L89.620 - PRESSURE ULCER OF LEFT HEEL, UNSTAGEABLE   Status: Acute   

Comment: present on admission   





- Plan


cont current plan of care, continue antibiotics, PT/OT, 





* medically stable for discharge


* medication reviewed as below


* symptomatic treatment


* await placement.








Review of Systems





- Review of Systems


ENT: negative: Ear Pain, Ear Discharge, Nose Pain, Nose Discharge, Nose 

Congestion, Mouth Pain, Mouth Swelling, Throat Pain, Throat Swelling, Other


Respiratory: negative: Cough, Dry, Shortness of Breath, Hemoptysis, SOB with 

Excertion, Pleuritic Pain, Sputum, Wheezing


Cardiovascular: negative: chest pain, palpitations, orthopnea, paroxysmal 

nocturnal dyspnea, edema, light headedness, other


Gastrointestinal: negative: Nausea, Vomiting, Abdominal Pain, Diarrhea, 

Constipation, Melena, Hematochezia, Other


Genitourinary: negative: Dysuria, Frequency, Incontinence, Hematuria, Retention

, Other


Musculoskeletal: negative: Neck Pain, Shoulder Pain, Arm Pain, Back Pain, Hand 

Pain, Leg Pain, Foot Pain, Other





- Medications/Allergies


Allergies/Adverse Reactions: 


 Allergies











Allergy/AdvReac Type Severity Reaction Status Date / Time


 


clarithromycin [From Biaxin] Allergy  Hives Verified 12/02/18 00:44











Medications: 


 Current Medications





Acetaminophen (Tylenol)  650 mg PO Q4H PRN


   PRN Reason: Headache/Fever/Mild Pain (1-3)


Hydrocodone Bitart/Acetaminophen (Norco 5/325)  1 tab PO Q4H PRN


   PRN Reason: Moderate Pain (4-6)


Hydrocodone Bitart/Acetaminophen (Norco 7.5/325)  1 tab PO Q4H PRN


   PRN Reason: Severe Pain (7-10)


Acidophilus (Floranex)  1 tab PO DAILY [facility]


   Last Admin: 12/09/18 09:19 Dose:  1 tab


Amoxicillin/Clavulanate Potassium (Augmentin)  875 mg PO BID [facility]


   Last Admin: 12/09/18 21:42 Dose:  875 mg


Baclofen (Lioresal)  15 mg PO 0600,2100 [facility]


   Last Admin: 12/10/18 05:21 Dose:  15 mg


Baclofen (Lioresal)  5 mg PO 1100,1600 [facility]


   Last Admin: 12/09/18 16:06 Dose:  5 mg


Bisacodyl (Dulcolax)  10 mg PO DAILYPRN PRN


   PRN Reason: Constipation


   Last Admin: 12/02/18 00:54 Dose:  10 mg


Brimonidine Tartrate (Alphagan 0.2% Ophth Soln)  1 drop R EYE TID [facility]


   Last Admin: 12/09/18 16:12 Dose:  1 drop


Cholecalciferol (Vitamin D3)  5,000 units PO DAILY [facility]


   Last Admin: 12/09/18 09:19 Dose:  5,000 units


Cyclobenzaprine HCl (Flexeril)  10 mg PO TID [facility]


   Last Admin: 12/09/18 21:44 Dose:  10 mg


Docusate Sodium (Colace)  100 mg PO [facility]


   Last Admin: 12/09/18 21:44 Dose:  100 mg


Enoxaparin Sodium (Lovenox)  40 mg SC 0900 [facility]


   Last Admin: 12/09/18 09:18 Dose:  40 mg


Famotidine (Pepcid)  20 mg PO BID [facility]


   Last Admin: 12/09/18 21:44 Dose:  20 mg


Fluoxetine HCl (Prozac)  20 mg PO BID [facility]


   Last Admin: 12/09/18 21:44 Dose:  20 mg


Gabapentin (Neurontin)  800 mg PO QID [facility]


   Last Admin: 12/09/18 21:42 Dose:  800 mg


Guaifenesin/Dextromethorphan (Robitussin Dm)  15 ml PO Q4H PRN


   PRN Reason: Cough


Hyoscyamine Sulfate (Levsin)  0.25 mg PO QID [facility]


   Last Admin: 12/09/18 21:42 Dose:  0.25 mg


Ibuprofen (Motrin)  800 mg PO Q6H PRN


   PRN Reason: Fever>101/(Mi/Mod/Sev) Pain


   Last Admin: 12/05/18 21:27 Dose:  800 mg


Latanoprost (Xalatan 0.005% Ophth Soln)  1 drop R EYE [facility]


   Last Admin: 12/09/18 21:44 Dose:  1 drop


Mesalamine (Canasa)  1,000 mg LA HSPRN PRN


   PRN Reason: Bleeding


   Last Admin: 12/07/18 11:59 Dose:  1,000 mg


Mometasone Furoate/Formoterol Fumar (Dulera 100 Mcg/5 Mcg Inhaler)  2 puff INH 

BID-RT [facility]


   Last Admin: 12/10/18 06:55 Dose:  2 puff


Ondansetron HCl (Zofran Odt)  4 mg PO Q6H PRN


   PRN Reason: Nausea/Vomiting


Ondansetron HCl (Zofran)  4 mg IVP Q6H PRN


   PRN Reason: Nausea/Vomiting


Senna/Docusate Sodium (Senokot S)  2 tab PO BIDPRN PRN


   PRN Reason: Constipation


Simethicone (Mylicon Chewable)  240 mg PO QID [facility]


   Last Admin: 12/09/18 21:41 Dose:  240 mg


Sodium Chloride (Flush - Normal Saline)  10 ml IVF Q12HR [facility]


   Last Admin: 12/09/18 21:45 Dose:  Not Given


Sodium Chloride (Flush - Normal Saline)  10 ml IVF PRN PRN


   PRN Reason: Saline Flush


   Last Admin: 12/02/18 03:34 Dose:  10 ml


Sodium Chloride (Param-128 5% Oph Sol)  1 drop R EYE QID [facility]


   Last Admin: 12/09/18 21:39 Dose:  1 drop


Trazodone HCl (Desyrel)  50 mg PO HS PRN


   PRN Reason: Insomnia


Zolpidem Tartrate (Ambien)  5 mg PO HSPRN PRN


   PRN Reason: Insomnia

## 2018-12-10 NOTE — DIS
DATE OF ADMISSION:  12/01/2018



DATE OF DISCHARGE:  12/10/2018



PRIMARY CARE PHYSICIAN:  Dr. Mares.



DISCHARGE DISPOSITION:  Skilled nursing home.



PRIMARY DISCHARGE DIAGNOSES:  Urinary tract infection related with Martinez catheter,

hypokalemia, lactic acidosis, left heel pressure ulcer unstageable present on

admission, sepsis resolved. 



SECONDARY DISCHARGE DIAGNOSES:  History of neurogenic bladder, multiple sclerosis,

functional quadriplegia, morbid obesity, ulcerative colitis, colostomy in place,

hypertension. 



PRIMARY PROCEDURE/OPERATION:  None.



RADIOLOGICAL INVESTIGATION:  MRI of lower extremity, foot x-ray.



SIGNIFICANT LABORATORY DATA:  WBC 13.6, hemoglobin 15.6, platelet 408.  Creatinine

0.59.  Electrolytes normal.  Urine culture grew Klebsiella and Enterococcus

faecalis.  Blood culture negative. 



DISCHARGE MEDICATIONS:  

1. Baclofen 15 mg b.i.d.

2. Alphagan ophthalmic drops t.i.d.

3. Vitamin D3 of 5000 units daily.

4. Flexeril 10 mg t.i.d.

5. Colace 100 mg at bedtime.

6. Prozac 20 mg b.i.d.

7. Advair 2 inhalations b.i.d.

8. Gabapentin 800 mg q.i.d.

9. Norco 7.5 one tablet q.4 hourly p.r.n.

10. Probiotic one capsule daily.

11. Mesalamine 1000 mg per rectum at bedtime p.r.n.

12. Travatan ophthalmic drops at bedtime.

13. Trazodone 50 mg p.o. at bedtime p.r.n.

14. Augmentin 875 mg twice daily for 5 more days.



CONTRAINDICATION:  None.



CODE STATUS:  DNR.



INPATIENT CONSULTANT:  Dr. Giraldo.



ALLERGIES:  CLARITHROMYCIN.



DISCHARGE PLAN:  Posthospital, the patient is discharged to skilled nursing home.



HOSPITAL COURSE:  A 58-year-old female with above-mentioned medical problem, who was

admitted by Dr. Oliver.  Please see his H and P for further details.  The patient

was admitted for left heel ulcer, which was related with pressure ulcer.  MRI did

not show any osteomyelitis.  She also had UTI, which was related with a Martinez

catheter.  While in hospital, she was given antibiotic therapy.  Dr. Giraldo was

consulted and Dr. Giraldo recommended to continue Augmentin for few more days. 



This patient remained in the hospital for longer because she was waiting for

discharge placement.  She was declined by all rehab facilities and finally, she

agreed to go to skilled nursing home. 



The patient is doing very well.  At this point, she is medically stable.  The

patient is seen and examined at bedside today.  Please see my progress note from

today for further detail.  Paperwork for discharge, done.  Discharge medication

reconciliation, done. 



TOTAL TIME SPENT:  Total time spent to coordinate discharge today is 31 minutes.







Job ID:  [id number]

## 2019-07-18 ENCOUNTER — HOSPITAL ENCOUNTER (OUTPATIENT)
Dept: HOSPITAL 92 - SDC | Age: 59
Discharge: HOME | End: 2019-07-18
Attending: INTERNAL MEDICINE
Payer: MEDICARE

## 2019-07-18 DIAGNOSIS — Z90.49: ICD-10-CM

## 2019-07-18 DIAGNOSIS — Z79.51: ICD-10-CM

## 2019-07-18 DIAGNOSIS — Z88.1: ICD-10-CM

## 2019-07-18 DIAGNOSIS — Z79.899: ICD-10-CM

## 2019-07-18 DIAGNOSIS — G35: ICD-10-CM

## 2019-07-18 DIAGNOSIS — K62.1: Primary | ICD-10-CM

## 2019-07-18 DIAGNOSIS — E66.01: ICD-10-CM

## 2019-07-18 DIAGNOSIS — K52.9: ICD-10-CM

## 2019-07-18 DIAGNOSIS — I73.00: ICD-10-CM

## 2019-07-18 DIAGNOSIS — J45.909: ICD-10-CM

## 2019-07-18 DIAGNOSIS — F41.9: ICD-10-CM

## 2019-07-18 DIAGNOSIS — Z87.891: ICD-10-CM

## 2019-07-18 PROCEDURE — 88305 TISSUE EXAM BY PATHOLOGIST: CPT

## 2019-07-18 PROCEDURE — 0DJD8ZZ INSPECTION OF LOWER INTESTINAL TRACT, VIA NATURAL OR ARTIFICIAL OPENING ENDOSCOPIC: ICD-10-PCS | Performed by: INTERNAL MEDICINE

## 2019-07-18 PROCEDURE — 0DBP8ZX EXCISION OF RECTUM, VIA NATURAL OR ARTIFICIAL OPENING ENDOSCOPIC, DIAGNOSTIC: ICD-10-PCS | Performed by: INTERNAL MEDICINE

## 2019-07-18 NOTE — OP
DATE OF PROCEDURE:  07/18/2019



PROCEDURES PERFORMED:  Ileoscopy through stoma and flexible proctosigmoidoscopy with

biopsy and snare polyp. 



PREOPERATIVE DIAGNOSES:  Hematochezia and surveillance of chronic ulcerative

proctitis. 



DESCRIPTION OF PROCEDURE:  Informed consent was obtained from the patient.  The

procedure was performed without sedation.  The endoscope was advanced through her

stoma into the distal ileum for several centimeters.  The mucosa of the distal ileum

was normal.  She was turned up on her side and rectal exam was performed and was

normal.  There may be a small shallow anal fissure.  The endoscope was advanced to

the proximal rectum, where the staple line was encountered.  She has a rectal stump,

status post prior colectomy.  There was a 5 mm polyp in the proximal rectum, which

was removed by snare cautery polypectomy.  This could be an area of granulation

tissue near a staple.  Rule out a dysplastic polyp.  The rectal mucosa was diffusely

erythematous with friability and granularity consistent with chronic ulcerative

colitis.  Biopsies were obtained randomly through the rectum to rule out dysplasia. 



IMPRESSION:  

1. Normal ileoscopy through the stoma.

2. Diffuse grainy friable proctitis consistent with her history of ulcerative

colitis status post subtotal colectomy with a rectal stump remaining.  Random

biopsies taken from the rectum to rule out dysplasia. 

3. A 5 mm polyp removed from the proximal rectum.



RECOMMENDATIONS:  

1. Await histopathology.

2. Canasa suppository daily.  Prior to this, she has only just been taking it on a

p.r.n. basis. 

3. Repeat proctoscopy in a year for ulcerative colitis surveillance.







Job ID:  [id number]

## 2019-08-01 ENCOUNTER — HOSPITAL ENCOUNTER (OUTPATIENT)
Dept: HOSPITAL 92 - ULT | Age: 59
Discharge: HOME | End: 2019-08-01
Attending: FAMILY MEDICINE
Payer: MEDICARE

## 2019-08-01 DIAGNOSIS — N95.0: ICD-10-CM

## 2019-08-01 DIAGNOSIS — M62.3: Primary | ICD-10-CM

## 2019-08-01 PROCEDURE — 76856 US EXAM PELVIC COMPLETE: CPT

## 2019-08-01 NOTE — ULT
PELVIC ULTRASOUND:

8/1/19

 

HISTORY: 

Postmenopausal bleeding. 

 

COMPARISON: 

None.

 

TECHNIQUE:  

Transabdominal and endovaginal imaging of the pelvis is performed.

 

FINDINGS: 

Markedly limited evaluation. Pelvic structures could not be assessed. No obvious masses or free fluid
. 

 

IMPRESSION: 

Markedly limited evaluation. 

 

POS: OFF

## 2019-08-10 ENCOUNTER — HOSPITAL ENCOUNTER (INPATIENT)
Dept: HOSPITAL 92 - ERS | Age: 59
LOS: 7 days | Discharge: HOME | DRG: 698 | End: 2019-08-17
Attending: HOSPITALIST | Admitting: HOSPITALIST
Payer: MEDICARE

## 2019-08-10 VITALS — BODY MASS INDEX: 44.4 KG/M2

## 2019-08-10 DIAGNOSIS — N39.0: ICD-10-CM

## 2019-08-10 DIAGNOSIS — A41.9: ICD-10-CM

## 2019-08-10 DIAGNOSIS — Z16.30: ICD-10-CM

## 2019-08-10 DIAGNOSIS — N31.9: ICD-10-CM

## 2019-08-10 DIAGNOSIS — K51.90: ICD-10-CM

## 2019-08-10 DIAGNOSIS — J96.01: ICD-10-CM

## 2019-08-10 DIAGNOSIS — Z88.1: ICD-10-CM

## 2019-08-10 DIAGNOSIS — Z79.899: ICD-10-CM

## 2019-08-10 DIAGNOSIS — G35: ICD-10-CM

## 2019-08-10 DIAGNOSIS — Z74.01: ICD-10-CM

## 2019-08-10 DIAGNOSIS — E87.6: ICD-10-CM

## 2019-08-10 DIAGNOSIS — R53.2: ICD-10-CM

## 2019-08-10 DIAGNOSIS — E66.01: ICD-10-CM

## 2019-08-10 DIAGNOSIS — J45.901: ICD-10-CM

## 2019-08-10 DIAGNOSIS — B96.1: ICD-10-CM

## 2019-08-10 DIAGNOSIS — M62.838: ICD-10-CM

## 2019-08-10 DIAGNOSIS — T83.511A: Primary | ICD-10-CM

## 2019-08-10 DIAGNOSIS — G47.33: ICD-10-CM

## 2019-08-10 DIAGNOSIS — G89.4: ICD-10-CM

## 2019-08-10 LAB
ALBUMIN SERPL BCG-MCNC: 3.9 G/DL (ref 3.5–5)
ALP SERPL-CCNC: 113 U/L (ref 40–150)
ALT SERPL W P-5'-P-CCNC: 34 U/L (ref 8–55)
ANION GAP SERPL CALC-SCNC: 16 MMOL/L (ref 10–20)
AST SERPL-CCNC: 30 U/L (ref 5–34)
BACTERIA UR QL AUTO: (no result) HPF
BASOPHILS # BLD AUTO: 0 THOU/UL (ref 0–0.2)
BASOPHILS NFR BLD AUTO: 0.3 % (ref 0–1)
BILIRUB SERPL-MCNC: 0.5 MG/DL (ref 0.2–1.2)
BUN SERPL-MCNC: 5 MG/DL (ref 9.8–20.1)
CALCIUM SERPL-MCNC: 9.6 MG/DL (ref 7.8–10.44)
CHLORIDE SERPL-SCNC: 103 MMOL/L (ref 98–107)
CK MB SERPL-MCNC: 0.6 NG/ML (ref 0–6.6)
CK SERPL-CCNC: 31 U/L (ref 29–168)
CO2 SERPL-SCNC: 23 MMOL/L (ref 22–29)
CREAT CL PREDICTED SERPL C-G-VRATE: 0 ML/MIN (ref 70–130)
EOSINOPHIL # BLD AUTO: 0.2 THOU/UL (ref 0–0.7)
EOSINOPHIL NFR BLD AUTO: 1.4 % (ref 0–10)
GLOBULIN SER CALC-MCNC: 3.1 G/DL (ref 2.4–3.5)
GLUCOSE SERPL-MCNC: 107 MG/DL (ref 70–105)
HGB BLD-MCNC: 16.4 G/DL (ref 12–16)
LEUKOCYTE ESTERASE UR QL STRIP.AUTO: 500 LEU/UL
LIPASE SERPL-CCNC: 8 U/L (ref 8–78)
LYMPHOCYTES # BLD: 1.5 THOU/UL (ref 1.2–3.4)
LYMPHOCYTES NFR BLD AUTO: 12.7 % (ref 21–51)
MCH RBC QN AUTO: 29.2 PG (ref 27–31)
MCV RBC AUTO: 89.3 FL (ref 78–98)
MONOCYTES # BLD AUTO: 1 THOU/UL (ref 0.11–0.59)
MONOCYTES NFR BLD AUTO: 8.9 % (ref 0–10)
MUCOUS THREADS UR QL AUTO: (no result) LPF
NEUTROPHILS # BLD AUTO: 8.9 THOU/UL (ref 1.4–6.5)
NEUTROPHILS NFR BLD AUTO: 76.7 % (ref 42–75)
PLATELET # BLD AUTO: 401 THOU/UL (ref 130–400)
POTASSIUM SERPL-SCNC: 3.8 MMOL/L (ref 3.5–5.1)
PROT UR STRIP.AUTO-MCNC: 10 MG/DL
RBC # BLD AUTO: 5.62 MILL/UL (ref 4.2–5.4)
RBC UR QL AUTO: (no result) HPF (ref 0–3)
SODIUM SERPL-SCNC: 138 MMOL/L (ref 136–145)
WBC # BLD AUTO: 11.6 THOU/UL (ref 4.8–10.8)

## 2019-08-10 PROCEDURE — 94760 N-INVAS EAR/PLS OXIMETRY 1: CPT

## 2019-08-10 PROCEDURE — 80048 BASIC METABOLIC PNL TOTAL CA: CPT

## 2019-08-10 PROCEDURE — 96375 TX/PRO/DX INJ NEW DRUG ADDON: CPT

## 2019-08-10 PROCEDURE — 71045 X-RAY EXAM CHEST 1 VIEW: CPT

## 2019-08-10 PROCEDURE — 90471 IMMUNIZATION ADMIN: CPT

## 2019-08-10 PROCEDURE — 90670 PCV13 VACCINE IM: CPT

## 2019-08-10 PROCEDURE — 82550 ASSAY OF CK (CPK): CPT

## 2019-08-10 PROCEDURE — 80053 COMPREHEN METABOLIC PANEL: CPT

## 2019-08-10 PROCEDURE — 36415 COLL VENOUS BLD VENIPUNCTURE: CPT

## 2019-08-10 PROCEDURE — 87186 SC STD MICRODIL/AGAR DIL: CPT

## 2019-08-10 PROCEDURE — 87086 URINE CULTURE/COLONY COUNT: CPT

## 2019-08-10 PROCEDURE — 93005 ELECTROCARDIOGRAM TRACING: CPT

## 2019-08-10 PROCEDURE — 85379 FIBRIN DEGRADATION QUANT: CPT

## 2019-08-10 PROCEDURE — 87077 CULTURE AEROBIC IDENTIFY: CPT

## 2019-08-10 PROCEDURE — 82805 BLOOD GASES W/O2 SATURATION: CPT

## 2019-08-10 PROCEDURE — 83605 ASSAY OF LACTIC ACID: CPT

## 2019-08-10 PROCEDURE — G0009 ADMIN PNEUMOCOCCAL VACCINE: HCPCS

## 2019-08-10 PROCEDURE — 71275 CT ANGIOGRAPHY CHEST: CPT

## 2019-08-10 PROCEDURE — 93306 TTE W/DOPPLER COMPLETE: CPT

## 2019-08-10 PROCEDURE — 96361 HYDRATE IV INFUSION ADD-ON: CPT

## 2019-08-10 PROCEDURE — 82553 CREATINE MB FRACTION: CPT

## 2019-08-10 PROCEDURE — 85025 COMPLETE CBC W/AUTO DIFF WBC: CPT

## 2019-08-10 PROCEDURE — 83690 ASSAY OF LIPASE: CPT

## 2019-08-10 PROCEDURE — 84484 ASSAY OF TROPONIN QUANT: CPT

## 2019-08-10 PROCEDURE — 81015 MICROSCOPIC EXAM OF URINE: CPT

## 2019-08-10 PROCEDURE — 83880 ASSAY OF NATRIURETIC PEPTIDE: CPT

## 2019-08-10 PROCEDURE — 81003 URINALYSIS AUTO W/O SCOPE: CPT

## 2019-08-10 PROCEDURE — 96365 THER/PROPH/DIAG IV INF INIT: CPT

## 2019-08-10 PROCEDURE — 87040 BLOOD CULTURE FOR BACTERIA: CPT

## 2019-08-10 NOTE — RAD
XR Chest 1 View Portable



History: Dyspnea



Comparison: Radiograph 2018



Findings: Scarring left lateral costophrenic sulcus is similar. Old right rib fractures with peripher
al pleural thickening. No pneumothorax. No focal confluent airspace consolidation. Cardiac

silhouette is similar.



Impression: Chronic findings. No acute intrathoracic abnormality.



Reported By: John Downey 

Electronically Signed:  8/10/2019 5:30 PM

## 2019-08-10 NOTE — CT
CTA Angio Chest W WO Con



History: Elevated d-dimer. Fever and weakness.



Comparison: Chest radiograph same day



Findings: CT angiogram chest performed after the intravenous ministration of contrast. 3-D rendering 
provided. Evaluation for distal embolism is limited due to the delayed phase of contrast. No

proximal segmental pulmonary arterial filling defect. Pulmonary trunk size is normal. Large volume pe
ricardial fat.



Mild atelectasis within the lung bases. No focal consolidation. No pneumothorax or effusion.



No mediastinal adenopathy. Likely subacute superior endplate compression fracture at T8.



No acute displaced rib fracture.



Impression: 

1. No proximal segmental pulmonary arterial filling defect.

2. No evidence for pneumonia.

3. Likely subacute T8 superior endplate compression fracture with 15-20% anterior height loss. No ret
ropulsion.



Reported By: John Downey 

Electronically Signed:  8/10/2019 7:36 PM

## 2019-08-11 LAB
ANALYZER IN CARDIO: (no result)
BASE EXCESS STD BLDA CALC-SCNC: 1.3 MEQ/L
CA-I BLDA-SCNC: 1.24 MMOL/L (ref 1.12–1.3)
HCO3 BLDA-SCNC: 26.5 MEQ/L (ref 22–28)
HCT VFR BLDA CALC: 47 % (ref 36–47)
HGB BLDA-MCNC: 16.1 G/DL (ref 12–16)
PCO2 BLDA: 43.6 MMHG (ref 35–45)
PH BLDA: 7.4 [PH] (ref 7.35–7.45)
PO2 BLDA: 76 MMHG (ref 80–100)
POTASSIUM BLD-SCNC: 3.4 MMOL/L (ref 3.7–5.3)
SPECIMEN DRAWN FROM PATIENT: (no result)

## 2019-08-11 RX ADMIN — SIMETHICONE SCH MG: 80 TABLET, CHEWABLE ORAL at 20:07

## 2019-08-11 RX ADMIN — HYDROCODONE BITARTRATE AND ACETAMINOPHEN PRN TAB: 7.5; 325 TABLET ORAL at 23:58

## 2019-08-11 RX ADMIN — GUAIFENESIN PRN MG: 200 SOLUTION ORAL at 00:22

## 2019-08-11 RX ADMIN — CEFTRIAXONE SCH MLS: 2 INJECTION, POWDER, FOR SOLUTION INTRAMUSCULAR; INTRAVENOUS at 08:40

## 2019-08-11 RX ADMIN — MOMETASONE FUROATE AND FORMOTEROL FUMARATE DIHYDRATE SCH PUFF: 100; 5 AEROSOL RESPIRATORY (INHALATION) at 18:38

## 2019-08-11 RX ADMIN — SIMETHICONE SCH MG: 80 TABLET, CHEWABLE ORAL at 17:39

## 2019-08-11 RX ADMIN — GUAIFENESIN PRN MG: 200 SOLUTION ORAL at 20:06

## 2019-08-11 RX ADMIN — GUAIFENESIN PRN MG: 200 SOLUTION ORAL at 23:56

## 2019-08-11 RX ADMIN — HYDROCODONE BITARTRATE AND ACETAMINOPHEN PRN TAB: 7.5; 325 TABLET ORAL at 14:24

## 2019-08-11 RX ADMIN — BRIMONIDINE TARTRATE SCH DRP: 2 SOLUTION OPHTHALMIC at 14:25

## 2019-08-11 RX ADMIN — LACTOBACILLUS ACIDOPH-L.BULGARICUS 1 MILLION CELL CHEWABLE TABLET SCH TAB: at 11:42

## 2019-08-11 RX ADMIN — SIMETHICONE SCH MG: 80 TABLET, CHEWABLE ORAL at 12:01

## 2019-08-11 RX ADMIN — HYDROCODONE BITARTRATE AND ACETAMINOPHEN PRN TAB: 7.5; 325 TABLET ORAL at 20:09

## 2019-08-11 RX ADMIN — BRIMONIDINE TARTRATE SCH APPLIC: 2 SOLUTION OPHTHALMIC at 09:52

## 2019-08-11 RX ADMIN — BRIMONIDINE TARTRATE SCH DRP: 2 SOLUTION OPHTHALMIC at 20:06

## 2019-08-11 RX ADMIN — SIMETHICONE SCH MG: 80 TABLET, CHEWABLE ORAL at 09:56

## 2019-08-11 RX ADMIN — MULTIPLE VITAMINS W/ MINERALS TAB SCH TAB: TAB at 17:38

## 2019-08-11 RX ADMIN — MESALAMINE PRN MG: 1000 SUPPOSITORY RECTAL at 20:17

## 2019-08-11 NOTE — CON
DATE OF CONSULTATION:  



CONSULTING PHYSICIAN:  Carroll Blunt DO



REASON FOR CONSULTATION:  Shortness of breath.



HISTORY OF PRESENT ILLNESS:  The patient is a 58-year-old female, who came to the

hospital with subacute onset of shortness of breath.  Initially, she told me it had

been over the last 2 or 3 days, but it sounds like symptoms have been progressive

over several months.  She was told recently in the ER that she had asthma, but in

questioning her, she has never had any formal pulmonary function test.  She tells me

she has had episodes where her blood pressure shoots up.  She has also had

difficulty swallowing.  She says that speech therapy has modified her diet because

she has such difficulty with her swallowing.  Apparently, albuterol does make her

breathing better. 



PAST MEDICAL HISTORY:  

1. Multiple sclerosis with below the waist paralysis.

2. Lymphedema.

3. Obesity.

4. Inflammatory bowel disease.



PAST SURGICAL HISTORY:  

1. Colectomy.

2. Bilateral tubal ligation.

3. Left wrist surgery.

4. Colostomy after failed ileostomy.



SOCIAL HISTORY:  Quit smoking more than 10 years ago.  Does not consume alcohol.



ALLERGIES:  BIAXIN.



MEDICATIONS:  Prior to admission, multiple, these are listed in the home medication

section in the chart and I have reviewed them personally. 



REVIEW OF SYSTEMS:  The patient is not on oxygen at home.  She is limited in her

mobility to a wheelchair.  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

with her and attend to her needs. 



PHYSICAL EXAMINATION:

VITAL SIGNS:  Temperature 98.5, pulse 114, respirations 19, O2 saturation 94%, blood

pressure 134/92.  The patient is 5 feet 10, weighs 309 pounds. 

HEENT:  She has a class 4 Mallampati airway. 

NECK:  No adenopathy or JVD. 

LUNGS:  She has very faint expiratory wheezing bilaterally. 

CARDIOVASCULAR:  S1 and S2.  Regular. 

ABDOMEN:  Soft, obese, nontender. 

EXTREMITIES:  Brawny edema from her knees downward.



LABORATORY DATA:  Sodium 138, potassium 3.8, chloride 103, CO2 23, BUN 5, creatinine

0.5 glucose 107.  Troponin 0.01.  BNP 16.2.  Urinalysis demonstrates numerous white

blood cells.  White blood cell count is 11.6, hematocrit is 50.2, and platelet count

401. 



ASSESSMENT:  Dyspnea.  Probably multifactorial.  She could have some component of

asthma.  Diastolic dysfunction needs to be in the differential.  Aspiration could

also be an issue given her multiple sclerosis and previous history.  Also I suspect

she has underlying obstructive sleep apnea. 



RECOMMENDATIONS:  I agree with the antibiotics, steroids, and nebulization

treatments.  Additionally, I would check an ABG to make sure she does not have CO2

retention and obesity hypoventilation syndrome. 



Thank you for the referral.  We will follow with you.







Job ID:  [id number]

## 2019-08-12 LAB
ANALYZER IN CARDIO: (no result)
ANION GAP SERPL CALC-SCNC: 17 MMOL/L (ref 10–20)
BASE EXCESS STD BLDA CALC-SCNC: -0.8 MEQ/L
BASOPHILS # BLD AUTO: 0 THOU/UL (ref 0–0.2)
BASOPHILS NFR BLD AUTO: 0.2 % (ref 0–1)
BUN SERPL-MCNC: 5 MG/DL (ref 9.8–20.1)
CA-I BLDA-SCNC: 1.22 MMOL/L (ref 1.12–1.3)
CALCIUM SERPL-MCNC: 9.6 MG/DL (ref 7.8–10.44)
CHLORIDE SERPL-SCNC: 102 MMOL/L (ref 98–107)
CO2 SERPL-SCNC: 25 MMOL/L (ref 22–29)
CREAT CL PREDICTED SERPL C-G-VRATE: 222 ML/MIN (ref 70–130)
EOSINOPHIL # BLD AUTO: 0.1 THOU/UL (ref 0–0.7)
EOSINOPHIL NFR BLD AUTO: 0.5 % (ref 0–10)
GLUCOSE SERPL-MCNC: 90 MG/DL (ref 70–105)
HCO3 BLDA-SCNC: 23.6 MEQ/L (ref 22–28)
HCT VFR BLDA CALC: 46 % (ref 36–47)
HGB BLD-MCNC: 16.4 G/DL (ref 12–16)
HGB BLDA-MCNC: 15.8 G/DL (ref 12–16)
LYMPHOCYTES # BLD: 1.3 THOU/UL (ref 1.2–3.4)
LYMPHOCYTES NFR BLD AUTO: 11.4 % (ref 21–51)
MCH RBC QN AUTO: 28.7 PG (ref 27–31)
MCV RBC AUTO: 90.8 FL (ref 78–98)
MONOCYTES # BLD AUTO: 1.7 THOU/UL (ref 0.11–0.59)
MONOCYTES NFR BLD AUTO: 14.9 % (ref 0–10)
NEUTROPHILS # BLD AUTO: 8.1 THOU/UL (ref 1.4–6.5)
NEUTROPHILS NFR BLD AUTO: 73 % (ref 42–75)
PCO2 BLDA: 38.4 MMHG (ref 35–45)
PH BLDA: 7.41 [PH] (ref 7.35–7.45)
PLATELET # BLD AUTO: 314 THOU/UL (ref 130–400)
PO2 BLDA: 59.1 MMHG (ref 80–100)
POTASSIUM BLD-SCNC: 3.31 MMOL/L (ref 3.7–5.3)
POTASSIUM SERPL-SCNC: 3.6 MMOL/L (ref 3.5–5.1)
RBC # BLD AUTO: 5.71 MILL/UL (ref 4.2–5.4)
SODIUM SERPL-SCNC: 140 MMOL/L (ref 136–145)
SPECIMEN DRAWN FROM PATIENT: (no result)
WBC # BLD AUTO: 11.1 THOU/UL (ref 4.8–10.8)

## 2019-08-12 RX ADMIN — LACTOBACILLUS ACIDOPH-L.BULGARICUS 1 MILLION CELL CHEWABLE TABLET SCH TAB: at 11:54

## 2019-08-12 RX ADMIN — MULTIPLE VITAMINS W/ MINERALS TAB SCH TAB: TAB at 16:26

## 2019-08-12 RX ADMIN — BRIMONIDINE TARTRATE SCH DRP: 2 SOLUTION OPHTHALMIC at 14:34

## 2019-08-12 RX ADMIN — SIMETHICONE SCH MG: 80 TABLET, CHEWABLE ORAL at 16:26

## 2019-08-12 RX ADMIN — HYDROCODONE BITARTRATE AND ACETAMINOPHEN PRN TAB: 7.5; 325 TABLET ORAL at 20:58

## 2019-08-12 RX ADMIN — GUAIFENESIN PRN MG: 200 SOLUTION ORAL at 12:01

## 2019-08-12 RX ADMIN — IPRATROPIUM BROMIDE SCH ML: 0.5 SOLUTION RESPIRATORY (INHALATION) at 14:29

## 2019-08-12 RX ADMIN — SIMETHICONE SCH MG: 80 TABLET, CHEWABLE ORAL at 08:04

## 2019-08-12 RX ADMIN — IPRATROPIUM BROMIDE SCH ML: 0.5 SOLUTION RESPIRATORY (INHALATION) at 22:00

## 2019-08-12 RX ADMIN — CEFTRIAXONE SCH MLS: 2 INJECTION, POWDER, FOR SOLUTION INTRAMUSCULAR; INTRAVENOUS at 08:05

## 2019-08-12 RX ADMIN — BRIMONIDINE TARTRATE SCH DRP: 2 SOLUTION OPHTHALMIC at 20:33

## 2019-08-12 RX ADMIN — HYDROCODONE BITARTRATE AND ACETAMINOPHEN PRN TAB: 7.5; 325 TABLET ORAL at 04:19

## 2019-08-12 RX ADMIN — BRIMONIDINE TARTRATE SCH DRP: 2 SOLUTION OPHTHALMIC at 08:00

## 2019-08-12 RX ADMIN — SIMETHICONE SCH MG: 80 TABLET, CHEWABLE ORAL at 12:14

## 2019-08-12 RX ADMIN — SIMETHICONE SCH MG: 80 TABLET, CHEWABLE ORAL at 20:39

## 2019-08-12 RX ADMIN — GUAIFENESIN PRN MG: 200 SOLUTION ORAL at 04:20

## 2019-08-12 RX ADMIN — MOMETASONE FUROATE AND FORMOTEROL FUMARATE DIHYDRATE SCH PUFF: 100; 5 AEROSOL RESPIRATORY (INHALATION) at 07:23

## 2019-08-12 RX ADMIN — GUAIFENESIN PRN MG: 200 SOLUTION ORAL at 20:58

## 2019-08-12 RX ADMIN — MOMETASONE FUROATE AND FORMOTEROL FUMARATE DIHYDRATE SCH PUFF: 100; 5 AEROSOL RESPIRATORY (INHALATION) at 18:35

## 2019-08-12 NOTE — RAD
Exam: Chest one view



COMPARISON: 8/10/2019



FINDINGS: Normal cardiac silhouette.

Pulmonary vessels and hilum

Right costophrenic angle is clear. Chronic changes opacify the left heart border and left hemidiaphra
gm. No pneumothorax or osseous abnormalities



IMPRESSION: No acute cardiopulmonary process.



Reported By: Cecelia Leyva 

Electronically Signed:  8/12/2019 9:00 AM

## 2019-08-12 NOTE — PQF
CLINICAL DOCUMENTATION IMPROVEMENT CLARIFICATION FORM:  ICD-10 Updated



PLEASE DO AN ADDENDUM TO THE PROGRESS NOTE WITH ANY DOCUMENTATION UPDATES OR 
ADDITIONS AND CARRY THROUGH TO DC SUMMARY.   THANK YOU.



DATE:            8/12/19                                                 ATTN: 
  DR. PALOMO



Please exercise your independent, professional judgment in responding to the 
clarification form. 

Clinical indicators are provided on the bottom of this form for your review



Please check appropriate box(es):



 [  ] Sepsis d/t UTI



 [ XX ] Sepsis d/t UTI d/t Indwelling Catheter



 [  ] Other diagnosis ___________ 



 [  ] Unable to determine



In addition, please specify:

Present on Admission (POA):  [ XX ] Yes             [  ] No             [  ] 
Unable to determine



For continuity of documentation, please document condition throughout progress 
notes and discharge summary.  Thank You.



CLINICAL INDICATORS - SIGNS / SYMPTOMS / LABS



H&P: "SEPSIS"



PULSE 110

RR 22



RISKS:

COMPLICATED URINARY TRACT INFECTION

ADVANCED MULTIPLE SCLEROSIS

CHRONIC INDWELLING CATHETER



TREATMENT:

IV ROCEPHIN (ER-PRESENT)

IV LEVAQUIN (ER)

IV FLUIDS (ER)

URINE CULTURES





(This form is maintained as a part of the permanent medical record)

 2015 [employer]  All Rights Reserved

[email or web address]    Office:  [phone]

                                                              

[facility]

## 2019-08-12 NOTE — PDOC.HOSPP
- Subjective


Subjective: 





Seen and examined. Patient tremulous after Duoneb, tachycardia. ABG and CXR 

ordered and reviewed. Patient seen again a while after Duoneb has worn off and 

improved. Breathing better overall. Motor strength is profoundly weak at 

baseline. Wheezing is improving since admission. Pain better controlled.





- Objective


Vital Signs & Weight: 


 Vital Signs (12 hours)











  Temp Pulse Resp BP Pulse Ox


 


 08/12/19 14:29   115 H  18   95


 


 08/12/19 12:00  100.3 F H  130 H  20  127/81  93 L


 


 08/12/19 09:00     140/80 


 


 08/12/19 08:00  99.7 F H  121 H  20  173/99 H  94 L


 


 08/12/19 07:20   112 H  16   94 L


 


 08/12/19 03:58  98.3 F  113 H  22 H  141/77 H  93 L








 Weight











Admit Weight                   309 lb 12.8 oz


 


Weight                         309 lb 12.8 oz














I&O: 


 











 08/11/19 08/12/19 08/13/19





 06:59 06:59 06:59


 


Intake Total 1036 1320 240


 


Output Total 925 3750 


 


Balance 111 -2430 240











Result Diagrams: 


 08/12/19 06:05





 08/12/19 06:05


Radiology Reviewed by me: Yes (CXR reviewed.)





ROS





- Medication


Medications: 


Active Medications











Generic Name Dose Route Start Last Admin





  Trade Name Freq  PRN Reason Stop Dose Admin


 


Hydrocodone Bitart/Acetaminophen  1 tab  08/11/19 08:03  08/12/19 04:19





  Norco 7.5/325  PO   1 tab





  Q4H PRN   Administration





  Moderate to Severe Pain (6-10)   





     





     





     


 


Acidophilus  1 tab  08/11/19 12:00  08/12/19 11:54





  Floranex  PO   1 tab





  1200 NAGA   Administration





     





     





     





     


 


Albuterol/Ipratropium  3 ml  08/11/19 00:11  08/11/19 08:49





  Duoneb  NEB   3 ml





  [id number] PRN   Administration





  SOB &/or Wheezing   





     





     





     


 


Baclofen  15 mg  08/11/19 23:59  08/11/19 23:57





  Lioresal  PO   15 mg





  2359 NAGA   Administration





     





     





     





     


 


Baclofen  15 mg  08/12/19 06:00  08/12/19 05:25





  Lioresal  PO   15 mg





  0600 NAGA   Administration





     





     





     





     


 


Baclofen  5 mg  08/11/19 12:00  08/12/19 11:54





  Lioresal  PO   5 mg





  1200 NAGA   Administration





     





     





     





     


 


Baclofen  5 mg  08/11/19 18:00  08/11/19 17:40





  Lioresal  PO   5 mg





  1800 NAGA   Administration





     





     





     





     


 


Brimonidine Tartrate  1 drop  08/11/19 09:00  08/12/19 14:34





  Alphagan 0.2% Ophth Soln  R EYE   1 drp





  TID NAGA   Administration





     





     





     





     


 


Cholecalciferol  5,000 units  08/11/19 09:00  08/12/19 08:06





  Vitamin D3  PO   5,000 units





  DAILY NAGA   Administration





     





     





     





     


 


Cyclobenzaprine HCl  10 mg  08/11/19 08:02  08/12/19 04:19





  Flexeril  PO   10 mg





  Q8H PRN   Administration





  Muscle Spasm   





     





     





     


 


Docusate Sodium  100 mg  08/11/19 21:00  08/11/19 20:08





  Colace  PO   100 mg





  HS NAGA   Administration





     





     





     





     


 


Enoxaparin Sodium  40 mg  08/12/19 09:00  08/12/19 10:00





  Lovenox  SC   40 mg





  0900 NAGA   Administration





     





     





     





     


 


Famotidine  20 mg  08/11/19 09:00  08/12/19 08:07





  Pepcid  PO   20 mg





  BID NAGA   Administration





     





     





     





     


 


Fluoxetine HCl  20 mg  08/11/19 09:00  08/12/19 08:07





  Prozac  PO   20 mg





  BID NAGA   Administration





     





     





     





     


 


Gabapentin  800 mg  08/11/19 09:00  08/12/19 12:14





  Neurontin  PO   800 mg





  QID NAGA   Administration





     





     





     





     


 


Guaifenesin  200 mg  08/11/19 00:15  08/12/19 12:01





  Robitussin Sf  PO   200 mg





  Q4H PRN   Administration





  Cough   





     





     





     


 


Hyoscyamine Sulfate  0.25 mg  08/11/19 11:30  08/12/19 11:53





  Levsin  PO   0.25 mg





  ACHS NAGA   Administration





     





     





     





     


 


Ceftriaxone Sodium 2 gm/  100 mls @ 200 mls/hr  08/11/19 09:00  08/12/19 08:05





  Sodium Chloride  IVPB   100 mls





  Q24HR NAGA   Administration





     





     





     





     


 


Ipratropium Bromide  2.5 ml  08/12/19 15:00  08/12/19 14:29





  Atrovent  NEB   2.5 ml





  [id number] NAGA   Administration





     





     





     





     


 


Iron/Minerals/Multivitamins  1 tab  08/11/19 17:00  08/11/19 17:38





  Theragran M  PO   1 tab





  QPM-WM NAGA   Administration





     





     





     





     


 


Latanoprost  1 drop  08/11/19 21:00  08/11/19 20:06





  Xalatan 0.005% Ophth Soln  R EYE   1 drop





  HS NAGA   Administration





     





     





     





     


 


Levalbuterol HCl  0.63 mg  08/12/19 15:00  08/12/19 14:31





  Xopenex  NEB   0.63 mg





  [id number] NAGA   Administration





     





     





     





     


 


Mesalamine  1,000 mg  08/11/19 08:03  08/11/19 20:17





  Canasa  SC   1,000 mg





  HSPRN PRN   Administration





  Bleeding   





     





     





     


 


Methylprednisolone Sodium Succinate  40 mg  08/11/19 09:00  08/12/19 08:07





  Solu-Medrol  IVP   40 mg





  DAILY NAGA   Administration





     





     





     





     


 


Mometasone Furoate/Formoterol Fumar  2 puff  08/11/19 18:30  08/12/19 07:23





  Dulera 100 Mcg/5 Mcg Inhaler  INH   2 puff





  BID-RT NAGA   Administration





     





     





     





     


 


Simethicone  240 mg  08/11/19 09:00  08/12/19 12:14





  Mylicon Chewable  PO   240 mg





  QID NAGA   Administration





     





     





     





     


 


Tramadol HCl  50 mg  08/11/19 08:03  08/11/19 11:47





  Ultram  PO   50 mg





  Q6H PRN   Administration





  Mild-Moderate Pain (1-5)   





     





     





     














- Exam


ill appearing


Eye: PERRL


ENT: moist mucosa


Neck: supple, symmetric, no JVD


Heart - other findings: Rapid regular rate. No murmur. No gallops. No rubs


Respiratory: no rales, no ronchi, wheezes


Respiratory - other findings: Decreased pulmonary excursion. 


Gastrointestinal: soft, non-tender, non-distended, normal bowel sounds


Extremities: 1+ LE edema


Skin: normal turgor, no lesions


Neurological: no new deficit


Musculoskeletal: generalized weakness, diffuse muscle atrophy


Psychiatric: normal affect, normal behavior, A&O x 3





Hosp A/P


(1) Asthma exacerbation


Code(s): J45.901 - UNSPECIFIED ASTHMA WITH (ACUTE) EXACERBATION   Status: Acute

   





(2) Complicated UTI (urinary tract infection)


Code(s): N39.0 - URINARY TRACT INFECTION, SITE NOT SPECIFIED   Status: Acute   





(3) HTN (hypertension)


Code(s): I10 - ESSENTIAL (PRIMARY) HYPERTENSION   Status: Chronic   





(4) Morbid obesity


Code(s): E66.01 - MORBID (SEVERE) OBESITY DUE TO EXCESS CALORIES   Status: 

Chronic   





(5) Multiple sclerosis, secondary progressive


Code(s): G35 - MULTIPLE SCLEROSIS   Status: Chronic   





(6) Neurogenic bladder


Code(s): N31.9 - NEUROMUSCULAR DYSFUNCTION OF BLADDER, UNSPECIFIED   Status: 

Chronic   





(7) Ulcerative colitis


Code(s): K51.90 - ULCERATIVE COLITIS, UNSPECIFIED, WITHOUT COMPLICATIONS   

Status: Chronic   





- Plan





Plan:


Pulmonology consultation, recommendations appreciated


IV steroids


IV abx with coverage for pulmonary and UTI organisms


Stop Duonebs with albuterol for worsening tachycardia 


Start Xopenex and Ipratropium H1mtlan


Echo with preserved EF


CTA chest neg for PE or focal PNA


Obesity hypoventilation syndrome less likely based off ABG


UTI culture with Klebsiella species


GI and DVT PPX

## 2019-08-12 NOTE — PRG
DATE OF SERVICE:  08/12/2019



SUBJECTIVE:  The patient is complaining of shortness of breath and tachycardia.



OBJECTIVE:  VITAL SIGNS:  Temperature 99.7, pulse 121, respirations 20, O2

saturation 94%, blood pressure 173/99. 

HEENT:  Unremarkable. 

NECK:  No adenopathy or JVD. 

LUNGS:  Mild expiratory wheezing. 

CARDIAC:  S1 and S2.  Tachycardic. 

ABDOMEN:  Soft. 

EXTREMITIES:  No edema.



LABORATORY DATA:  White blood cell count 11, hematocrit 51.9, platelet count 314.

ABG; pH 7.41, pCO2 of 38, pO2 of 59.  Sodium 140, potassium 3.6, chloride 102, CO2

of 25, BUN 5, creatinine 0.6, glucose 90.  Chest x-ray, basically negative.

Echocardiogram demonstrates mild concentric LVH.  Valvular function is normal. 



ASSESSMENT:  

1. Asthmatic bronchitis.

2. No evidence of obesity hypoventilation syndrome by ABG.

3. Multiple sclerosis, perhaps weakened diaphragmatic function.



PLAN:  

1. Continue antibiotics and steroids.

2. We will decrease frequency of nebulization treatments given her tachycardia.







Job ID:  [id number]

## 2019-08-12 NOTE — HP
CHIEF COMPLAINT:  Shortness of breath and generalized weakness.



HISTORY OF PRESENT ILLNESS:  This is a very pleasant 58-year-old white female 
with

past medical history of advanced multiple sclerosis with profound debility and 
paraplegic from the waist down,

chronic muscle spasm, degenerative disk disease, chronic pain, asthma, 
ulcerative

colitis status post subtotal colectomy.  She does occasionally have rectal 
bleeding

from the stump of her colon/rectum from her UC disease. Patient with 
neuropathic pain, UC, elevated

BMI, and debility.  The patient states that she lives at home and she does have

several care takers, who are able to  help care for her.  The patient does also 
have home

health care who assists with activity such as bathing.  Over the past

several days, the patient complaining of feeling worse including worsening 
shortness of

breath, generalized weakness, sore throat, cough.  The patient presented to 
[facility] for further evaluation.  In the emergency department found to have

elevated D-dimer and the CT angiography of the chest was performed, please see

full radiographic study for details.  CT angiography of the chest negative for 
acute PE,

no pneumonia, no other acute cardiothoracic pathology. The patient states that

she was supposed to get an echocardiogram completed last Thursday by her primary

care though she missed the appointment, secondary to being sick.  The patient 
denies prior history of cardiac

disease, no MI, denies history of heart failure. 



PAST MEDICAL HISTORY:  

1. Debilitating multiple sclerosis, resulting in bedbound state, hemiplegia 
from the

waist down. 

2. Ulcerative colitis, status post subtotal colectomy, she still has a very 
small

portion of her sigmoid colon, which does occasionally bleed per the patient, 
this

is chronic. 

3. Chronic pain syndrome.

4. Neuropathic pain.

5. Muscle spasms.

6. Asthma.

7. Elevated BMI.

8. Debility.



REVIEW OF SYSTEMS:  A 10-point review of systems was completed and negative 
aside

from above what mentioned in the history of present illness. 



MEDICATIONS:  Please see home medication list for details

1. Trazodone.

2. Tramadol.

3. Travoprost.

4. Lyrica.

5. Vitamin

6. Mupirocin.

7.  Baclofen.

8. Meclizine.

9.  Duloxetine.

10. Ibuprofen.

11.  Advair  

12. Hydrocodone.

13. Gabapentin.

 



ALLERGIES: Clarithromycin



PHYSICAL EXAMINATION:

VITAL SIGNS:  Temperature  98.6 pulse  73, respirations  18,

O2 saturation 94%  on 2L nasal cannula, and blood pressure 123/78. 

GENERAL: The patient appears ill, in mild acute pulmonary distress.    

HEENT: hoarseness to her voice, moist oral mucosa without lesion or exudates on 
the tonsil. 

NECK:  No lymphadenopathy . 

CARDIOVASCULAR:  Regular rate and rhythm, faint heart sounds secondary to body

habitus. No appreciable  murmur, rubs, or gallops. 

LUNGS:  There is few scattered wheezing in the upper airways, no rales, no

ronchus appreciated, poor air movement secondary to body habitus.  

ABDOMEN:  Soft, non tender, non distended, obese.  No appreciable hepatomegaly 
or splenomegaly.   

BACK:  Diffusely tender to palpation of the spine, without obvious deformities

EXTREMITIES:  +1 lower extremity edema, which is chronic for the patient.

There is muscle wasting in the lower extremities. 

NEUROLOGIC:  The patient has 1/4 motor strength in lower extremities and 3/4

bilaterally in the upper extremities.  The patient has no new neurologic

deficits.  Decreased sensation in lower extremities. 



LABORATORY DATA:  WBC 11, hemoglobin 16.4,  platelets, D-dimer 0.64, sodium, 
anion gap 16. 

 See lab report for full details



ASSESSMENT:  

1. Sepsis.

2. Complicated urinary tract infection 

3. Shortness of breath with hypoxia.

4. Asthma with acute exacerbation 

5. Advanced multiple sclerosis, with severe debility and plegia of the lower 
extremity bilaterally and bedbound state.

6. Neuropathic pain.

7. Chronic pain.

8. Muscle spasms.

9. Elevated BMI  

10. Ulcerative colitis, status post subtotal colectomy with frequent rectal 
bleeding.

11. Debility.



PLAN:  

1. Admit to medical floor/surgical unit.

2. Broad spectrum antibiotics for complicated urinary tract infection de 
escilate to

culture and sensitivity as able. 

3. Urine culture.

4. Blood culture.

5. The patient's CT angiography of the chest is normal, negative for PE, no 
obvious heart

failure, the patient's shortness of breath has likely attributed to her elevated

BMI, obesity hypoventilation is suspected vs asthma exacerbation

6. Pulmonology consultation, recommendation appreciated.

7. Small volume nebulizers scheduled and as needed.

8. Continue Advair, if available on formulary.

9. Echocardiogram.

10. Lower extremity US rule out DVT.

11. GI prophylaxis.

12. DVT prophylaxis.

13. Continue other home medications as able







Job ID:  [id number]



[facility]

## 2019-08-13 LAB
ANION GAP SERPL CALC-SCNC: 14 MMOL/L (ref 10–20)
BUN SERPL-MCNC: 6 MG/DL (ref 9.8–20.1)
CALCIUM SERPL-MCNC: 9.3 MG/DL (ref 7.8–10.44)
CHLORIDE SERPL-SCNC: 102 MMOL/L (ref 98–107)
CO2 SERPL-SCNC: 25 MMOL/L (ref 22–29)
CREAT CL PREDICTED SERPL C-G-VRATE: 246 ML/MIN (ref 70–130)
GLUCOSE SERPL-MCNC: 86 MG/DL (ref 70–105)
HGB BLD-MCNC: 15.7 G/DL (ref 12–16)
MCH RBC QN AUTO: 29.5 PG (ref 27–31)
MCV RBC AUTO: 88.9 FL (ref 78–98)
MDIFF COMPLETE?: YES
PLATELET # BLD AUTO: 274 THOU/UL (ref 130–400)
POTASSIUM SERPL-SCNC: 3.3 MMOL/L (ref 3.5–5.1)
RBC # BLD AUTO: 5.33 MILL/UL (ref 4.2–5.4)
SODIUM SERPL-SCNC: 138 MMOL/L (ref 136–145)
WBC # BLD AUTO: 13.3 THOU/UL (ref 4.8–10.8)

## 2019-08-13 RX ADMIN — SIMETHICONE SCH MG: 80 TABLET, CHEWABLE ORAL at 13:48

## 2019-08-13 RX ADMIN — BRIMONIDINE TARTRATE SCH DRP: 2 SOLUTION OPHTHALMIC at 16:28

## 2019-08-13 RX ADMIN — MOMETASONE FUROATE AND FORMOTEROL FUMARATE DIHYDRATE SCH PUFF: 100; 5 AEROSOL RESPIRATORY (INHALATION) at 18:11

## 2019-08-13 RX ADMIN — CEFTRIAXONE SCH MLS: 2 INJECTION, POWDER, FOR SOLUTION INTRAMUSCULAR; INTRAVENOUS at 08:23

## 2019-08-13 RX ADMIN — GUAIFENESIN PRN MG: 200 SOLUTION ORAL at 11:52

## 2019-08-13 RX ADMIN — LACTOBACILLUS ACIDOPH-L.BULGARICUS 1 MILLION CELL CHEWABLE TABLET SCH TAB: at 11:43

## 2019-08-13 RX ADMIN — MULTIPLE VITAMINS W/ MINERALS TAB SCH TAB: TAB at 16:35

## 2019-08-13 RX ADMIN — GUAIFENESIN PRN MG: 200 SOLUTION ORAL at 08:14

## 2019-08-13 RX ADMIN — MESALAMINE PRN MG: 1000 SUPPOSITORY RECTAL at 20:23

## 2019-08-13 RX ADMIN — SIMETHICONE SCH MG: 80 TABLET, CHEWABLE ORAL at 10:01

## 2019-08-13 RX ADMIN — BRIMONIDINE TARTRATE SCH DRP: 2 SOLUTION OPHTHALMIC at 20:26

## 2019-08-13 RX ADMIN — IPRATROPIUM BROMIDE SCH ML: 0.5 SOLUTION RESPIRATORY (INHALATION) at 15:39

## 2019-08-13 RX ADMIN — BRIMONIDINE TARTRATE SCH DRP: 2 SOLUTION OPHTHALMIC at 08:08

## 2019-08-13 RX ADMIN — IPRATROPIUM BROMIDE SCH ML: 0.5 SOLUTION RESPIRATORY (INHALATION) at 07:06

## 2019-08-13 RX ADMIN — IPRATROPIUM BROMIDE SCH ML: 0.5 SOLUTION RESPIRATORY (INHALATION) at 22:08

## 2019-08-13 RX ADMIN — HYDROCODONE BITARTRATE AND ACETAMINOPHEN PRN TAB: 7.5; 325 TABLET ORAL at 08:12

## 2019-08-13 RX ADMIN — SIMETHICONE SCH MG: 80 TABLET, CHEWABLE ORAL at 20:22

## 2019-08-13 RX ADMIN — MOMETASONE FUROATE AND FORMOTEROL FUMARATE DIHYDRATE SCH PUFF: 100; 5 AEROSOL RESPIRATORY (INHALATION) at 07:02

## 2019-08-13 RX ADMIN — SIMETHICONE SCH MG: 80 TABLET, CHEWABLE ORAL at 16:36

## 2019-08-13 NOTE — PDOC.HOSPP
- Subjective


Subjective: 





Seen and examined. Patient coughing more and wheezing more. Patient still 

feeling and looking ill. Less tremulous/ shaky off Albuterol. No new complaints





- Objective


Vital Signs & Weight: 


 Vital Signs (12 hours)











  Temp Pulse Resp BP Pulse Ox


 


 08/13/19 11:48  98.2 F  112 H  22 H  148/83 H  93 L


 


 08/13/19 08:00      97


 


 08/13/19 07:21  98.0 F  118 H  22 H  142/75 H  97


 


 08/13/19 07:06      91 L


 


 08/13/19 07:05   113 H  16   90 L


 


 08/13/19 07:02   113 H  20   91 L


 


 08/13/19 04:01  98.7 F  122 H  20  132/74  93 L








 Weight











Admit Weight                   309 lb 12.8 oz


 


Weight                         309 lb 12.8 oz














I&O: 


 











 08/12/19 08/13/19 08/14/19





 06:59 06:59 06:59


 


Intake Total 1320 2020 


 


Output Total 3750 3050 


 


Balance -2430 -1030 











Result Diagrams: 


 08/13/19 06:16





 08/13/19 06:16





ROS





- Medication


Medications: 


Active Medications











Generic Name Dose Route Start Last Admin





  Trade Name Freq  PRN Reason Stop Dose Admin


 


Hydrocodone Bitart/Acetaminophen  1 tab  08/11/19 08:03  08/13/19 08:12





  Norco 7.5/325  PO   1 tab





  Q4H PRN   Administration





  Moderate to Severe Pain (6-10)   





     





     





     


 


Acidophilus  1 tab  08/11/19 12:00  08/13/19 11:43





  Floranex  PO   1 tab





  1200 NAGA   Administration





     





     





     





     


 


Albuterol/Ipratropium  3 ml  08/11/19 00:11  08/11/19 08:49





  Duoneb  NEB   3 ml





  [id number] PRN   Administration





  SOB &/or Wheezing   





     





     





     


 


Baclofen  15 mg  08/11/19 23:59  08/13/19 00:05





  Lioresal  PO   15 mg





  2359 NAGA   Administration





     





     





     





     


 


Baclofen  15 mg  08/12/19 06:00  08/13/19 05:27





  Lioresal  PO   15 mg





  0600 NAGA   Administration





     





     





     





     


 


Baclofen  5 mg  08/11/19 12:00  08/13/19 11:42





  Lioresal  PO   5 mg





  1200 NAGA   Administration





     





     





     





     


 


Baclofen  5 mg  08/11/19 18:00  08/12/19 18:23





  Lioresal  PO   5 mg





  1800 NAGA   Administration





     





     





     





     


 


Brimonidine Tartrate  1 drop  08/11/19 09:00  08/13/19 08:08





  Alphagan 0.2% Ophth Soln  R EYE   1 drp





  TID NAGA   Administration





     





     





     





     


 


Cholecalciferol  5,000 units  08/11/19 09:00  08/13/19 08:17





  Vitamin D3  PO   5,000 units





  DAILY NAGA   Administration





     





     





     





     


 


Cyclobenzaprine HCl  10 mg  08/11/19 08:02  08/12/19 04:19





  Flexeril  PO   10 mg





  Q8H PRN   Administration





  Muscle Spasm   





     





     





     


 


Docusate Sodium  100 mg  08/11/19 21:00  08/12/19 20:32





  Colace  PO   100 mg





  HS NAGA   Administration





     





     





     





     


 


Enoxaparin Sodium  40 mg  08/12/19 09:00  08/13/19 08:15





  Lovenox  SC   40 mg





  0900 NAGA   Administration





     





     





     





     


 


Famotidine  20 mg  08/11/19 09:00  08/13/19 08:16





  Pepcid  PO   20 mg





  BID NAGA   Administration





     





     





     





     


 


Fluoxetine HCl  20 mg  08/11/19 09:00  08/13/19 08:16





  Prozac  PO   20 mg





  BID NAGA   Administration





     





     





     





     


 


Gabapentin  800 mg  08/11/19 09:00  08/13/19 13:48





  Neurontin  PO   800 mg





  QID NAGA   Administration





     





     





     





     


 


Guaifenesin  200 mg  08/11/19 00:15  08/13/19 11:52





  Robitussin Sf  PO   200 mg





  Q4H PRN   Administration





  Cough   





     





     





     


 


Hyoscyamine Sulfate  0.25 mg  08/11/19 11:30  08/13/19 11:41





  Levsin  PO   0.25 mg





  ACHS NAGA   Administration





     





     





     





     


 


Ceftriaxone Sodium 2 gm/  100 mls @ 200 mls/hr  08/11/19 09:00  08/13/19 08:23





  Sodium Chloride  IVPB   100 mls





  Q24HR NAGA   Administration





     





     





     





     


 


Ipratropium Bromide  2.5 ml  08/12/19 15:00  08/13/19 07:06





  Atrovent  NEB   2.5 ml





  [id number] NAGA   Administration





     





     





     





     


 


Iron/Minerals/Multivitamins  1 tab  08/11/19 17:00  08/12/19 16:26





  Theragran M  PO   1 tab





  QPM-WM NAGA   Administration





     





     





     





     


 


Latanoprost  1 drop  08/11/19 21:00  08/12/19 20:32





  Xalatan 0.005% Ophth Soln  R EYE   1 drop





  HS NAGA   Administration





     





     





     





     


 


Levalbuterol HCl  0.63 mg  08/12/19 15:00  08/13/19 07:05





  Xopenex  NEB   0.63 mg





  [id number] NAGA   Administration





     





     





     





     


 


Mesalamine  1,000 mg  08/11/19 08:03  08/11/19 20:17





  Canasa  NE   1,000 mg





  HSPRN PRN   Administration





  Bleeding   





     





     





     


 


Methylprednisolone Sodium Succinate  40 mg  08/11/19 09:00  08/13/19 08:16





  Solu-Medrol  IVP   40 mg





  DAILY NAGA   Administration





     





     





     





     


 


Mometasone Furoate/Formoterol Fumar  2 puff  08/11/19 18:30  08/13/19 07:02





  Dulera 100 Mcg/5 Mcg Inhaler  INH   2 puff





  BID-RT NAGA   Administration





     





     





     





     


 


Simethicone  240 mg  08/11/19 09:00  08/13/19 13:48





  Mylicon Chewable  PO   240 mg





  QID NAGA   Administration





     





     





     





     


 


Tramadol HCl  50 mg  08/11/19 08:03  08/11/19 11:47





  Ultram  PO   50 mg





  Q6H PRN   Administration





  Mild-Moderate Pain (1-5)   





     





     





     














- Exam


ill appearing


Eye: PERRL


Eye - other findings: EOMI


ENT: normocephalic atraumatic


Neck: supple


Heart: RRR, no murmur, no gallops


Respiratory: rhonchi, wheezes.  negative: no rales


Respiratory - other findings: Decreased chest expansion secondary to body 

habitus


Gastrointestinal: soft, non-tender, non-distended, normal bowel sounds


Extremities: 1+ LE edema


Skin: no rashes


Neurological: CN's grossly intact, no new deficit


Neurological - other findings: LE plegic at baseline


Musculoskeletal: generalized weakness, diffuse muscle atrophy


Psychiatric: normal affect, A&O x 3





Hosp A/P


(1) Asthma exacerbation


Code(s): J45.901 - UNSPECIFIED ASTHMA WITH (ACUTE) EXACERBATION   Status: Acute

   





(2) Complicated UTI (urinary tract infection)


Code(s): N39.0 - URINARY TRACT INFECTION, SITE NOT SPECIFIED   Status: Acute   





(3) HTN (hypertension)


Code(s): I10 - ESSENTIAL (PRIMARY) HYPERTENSION   Status: Chronic   





(4) Morbid obesity


Code(s): E66.01 - MORBID (SEVERE) OBESITY DUE TO EXCESS CALORIES   Status: 

Chronic   





(5) Multiple sclerosis, secondary progressive


Code(s): G35 - MULTIPLE SCLEROSIS   Status: Chronic   





(6) Neurogenic bladder


Code(s): N31.9 - NEUROMUSCULAR DYSFUNCTION OF BLADDER, UNSPECIFIED   Status: 

Chronic   





(7) Ulcerative colitis


Code(s): K51.90 - ULCERATIVE COLITIS, UNSPECIFIED, WITHOUT COMPLICATIONS   

Status: Chronic   





- Plan





Plan:


Pulmonology consultation, recommendations appreciated


IV steroids


IV abx with coverage for pulmonary and UTI organisms


Continue Xopenex and Ipratropium [id number]


Echo with preserved EF


CTA chest neg for PE or focal PNA


UTI culture with Klebsiella species


GI and DVT PPX


Continue current plan of care

## 2019-08-13 NOTE — PRG
DATE OF SERVICE:  08/13/2019



SUBJECTIVE:  The patient is coughing more than what she was.  Continues to be short

of breath. 



OBJECTIVE:  VITAL SIGNS:  On exam, temperature is 98.2, pulse 112, respirations 20,

O2 saturation 93%, and blood pressure 148/83. 

HEENT:  Unremarkable. 

NECK:  No adenopathy or JVD. 

LUNGS:  Coarse wheezing bilaterally. 

CARDIAC:  S1 and S2.  Regular. 

ABDOMEN:  Soft. 

EXTREMITIES:  No edema.



ASSESSMENT:  Asthmatic bronchitis.



PLAN:  I would continue steroids, nebulization therapy, and antibiotics.  This trial

probably take several days to get better. 







Job ID:  [id number]

## 2019-08-14 LAB
ANION GAP SERPL CALC-SCNC: 9 MMOL/L (ref 10–20)
BASOPHILS # BLD AUTO: 0.1 THOU/UL (ref 0–0.2)
BASOPHILS NFR BLD AUTO: 0.7 % (ref 0–1)
BUN SERPL-MCNC: 6 MG/DL (ref 9.8–20.1)
CALCIUM SERPL-MCNC: 9.4 MG/DL (ref 7.8–10.44)
CHLORIDE SERPL-SCNC: 100 MMOL/L (ref 98–107)
CO2 SERPL-SCNC: 33 MMOL/L (ref 22–29)
CREAT CL PREDICTED SERPL C-G-VRATE: 255 ML/MIN (ref 70–130)
EOSINOPHIL # BLD AUTO: 0 THOU/UL (ref 0–0.7)
EOSINOPHIL NFR BLD AUTO: 0.2 % (ref 0–10)
GLUCOSE SERPL-MCNC: 86 MG/DL (ref 70–105)
HGB BLD-MCNC: 14.6 G/DL (ref 12–16)
LYMPHOCYTES # BLD: 3.2 THOU/UL (ref 1.2–3.4)
LYMPHOCYTES NFR BLD AUTO: 38.4 % (ref 21–51)
MCH RBC QN AUTO: 28.5 PG (ref 27–31)
MCV RBC AUTO: 90.8 FL (ref 78–98)
MONOCYTES # BLD AUTO: 1.2 THOU/UL (ref 0.11–0.59)
MONOCYTES NFR BLD AUTO: 14 % (ref 0–10)
NEUTROPHILS # BLD AUTO: 3.9 THOU/UL (ref 1.4–6.5)
NEUTROPHILS NFR BLD AUTO: 46.7 % (ref 42–75)
PLATELET # BLD AUTO: 328 THOU/UL (ref 130–400)
POTASSIUM SERPL-SCNC: 3.4 MMOL/L (ref 3.5–5.1)
RBC # BLD AUTO: 5.14 MILL/UL (ref 4.2–5.4)
SODIUM SERPL-SCNC: 139 MMOL/L (ref 136–145)
WBC # BLD AUTO: 8.4 THOU/UL (ref 4.8–10.8)

## 2019-08-14 RX ADMIN — MOMETASONE FUROATE AND FORMOTEROL FUMARATE DIHYDRATE SCH PUFF: 100; 5 AEROSOL RESPIRATORY (INHALATION) at 18:39

## 2019-08-14 RX ADMIN — BRIMONIDINE TARTRATE SCH DRP: 2 SOLUTION OPHTHALMIC at 09:28

## 2019-08-14 RX ADMIN — BRIMONIDINE TARTRATE SCH DRP: 2 SOLUTION OPHTHALMIC at 21:53

## 2019-08-14 RX ADMIN — MOMETASONE FUROATE AND FORMOTEROL FUMARATE DIHYDRATE SCH PUFF: 100; 5 AEROSOL RESPIRATORY (INHALATION) at 07:00

## 2019-08-14 RX ADMIN — MESALAMINE PRN MG: 1000 SUPPOSITORY RECTAL at 22:01

## 2019-08-14 RX ADMIN — IPRATROPIUM BROMIDE SCH ML: 0.5 SOLUTION RESPIRATORY (INHALATION) at 12:31

## 2019-08-14 RX ADMIN — SIMETHICONE SCH MG: 80 TABLET, CHEWABLE ORAL at 12:22

## 2019-08-14 RX ADMIN — CEFTRIAXONE SCH MLS: 2 INJECTION, POWDER, FOR SOLUTION INTRAMUSCULAR; INTRAVENOUS at 09:26

## 2019-08-14 RX ADMIN — IPRATROPIUM BROMIDE SCH ML: 0.5 SOLUTION RESPIRATORY (INHALATION) at 06:59

## 2019-08-14 RX ADMIN — SIMETHICONE SCH MG: 80 TABLET, CHEWABLE ORAL at 09:28

## 2019-08-14 RX ADMIN — MULTIPLE VITAMINS W/ MINERALS TAB SCH TAB: TAB at 16:21

## 2019-08-14 RX ADMIN — IPRATROPIUM BROMIDE SCH ML: 0.5 SOLUTION RESPIRATORY (INHALATION) at 18:39

## 2019-08-14 RX ADMIN — SIMETHICONE SCH MG: 80 TABLET, CHEWABLE ORAL at 16:20

## 2019-08-14 RX ADMIN — LACTOBACILLUS ACIDOPH-L.BULGARICUS 1 MILLION CELL CHEWABLE TABLET SCH TAB: at 12:20

## 2019-08-14 RX ADMIN — SIMETHICONE SCH MG: 80 TABLET, CHEWABLE ORAL at 21:54

## 2019-08-14 RX ADMIN — BRIMONIDINE TARTRATE SCH DRP: 2 SOLUTION OPHTHALMIC at 15:20

## 2019-08-14 NOTE — PRG
DATE OF SERVICE:  08/14/2019



SUBJECTIVE:  She says she feels better today.  She is having a runny nose and a

croupy cough. 



OBJECTIVE:  VITAL SIGNS:  Temperature 98.0, pulse 101, respirations 20, O2

saturation 94% on 1 L, and blood pressure 156/99. 

HEENT:  Unremarkable. 

NECK:  No JVD. 

LUNGS:  Decreased wheezing. 

CARDIAC:  S1 and S2.  Regular. 

ABDOMEN:  Soft. 

EXTREMITIES:  Edematous.



LABORATORY DATA:  White blood cell count 8.4, hematocrit 46.6, and platelet count

328.  Sodium 139, potassium 3.4, chloride 100, CO2 of 33, BUN 6, creatinine 0.5, and

glucose 86. 



ASSESSMENT:  Asthmatic bronchitis.



PLAN:  Continue nebs, steroids, and antibiotics.  I think she can probably go home

by tomorrow.  I will be out of town.  If there is any further pulmonary concerns,

please call one of my partners on-call. 







Job ID:  [id number]

## 2019-08-14 NOTE — PDOC.HOSPP
- Subjective


Subjective: 





Seen and examined. Clinically improving. Still with cough, feels like she cant 

get the mucus out. Cough is weak from MS and possible weaking of the diaphragm. 

Patient overall feeling much better. 





- Objective


Vital Signs & Weight: 


 Vital Signs (12 hours)











  Temp Pulse Resp BP BP Pulse Ox


 


 08/14/19 11:52  98.4 F  93  20  158/98 H   94 L


 


 08/14/19 08:00       96


 


 08/14/19 07:27  98.2 F  94  18  148/93 H   95


 


 08/14/19 06:59   101 H  20    94 L


 


 08/14/19 06:57   101 H  20    94 L


 


 08/14/19 04:00  98.0 F  90  20   156/99 H  94 L








 Weight











Admit Weight                   309 lb 12.8 oz


 


Weight                         309 lb 12.8 oz














I&O: 


 











 08/13/19 08/14/19 08/15/19





 06:59 06:59 06:59


 


Intake Total 2020 1810 


 


Output Total 3050 1650 


 


Balance -1030 160 











Result Diagrams: 


 08/14/19 05:21





 08/14/19 05:21





ROS





- Medication


Medications: 


Active Medications











Generic Name Dose Route Start Last Admin





  Trade Name Freq  PRN Reason Stop Dose Admin


 


Hydrocodone Bitart/Acetaminophen  1 tab  08/11/19 08:03  08/13/19 08:12





  Norco 7.5/325  PO   1 tab





  Q4H PRN   Administration





  Moderate to Severe Pain (6-10)   





     





     





     


 


Acidophilus  1 tab  08/11/19 12:00  08/13/19 11:43





  Floranex  PO   1 tab





  1200 NAGA   Administration





     





     





     





     


 


Albuterol/Ipratropium  3 ml  08/11/19 00:11  08/11/19 08:49





  Duoneb  NEB   3 ml





  [id number]-[id number] PRN   Administration





  SOB &/or Wheezing   





     





     





     


 


Baclofen  15 mg  08/11/19 23:59  08/14/19 00:47





  Lioresal  PO   15 mg





  2359 NAGA   Administration





     





     





     





     


 


Baclofen  15 mg  08/12/19 06:00  08/14/19 05:13





  Lioresal  PO   15 mg





  0600 NAGA   Administration





     





     





     





     


 


Baclofen  5 mg  08/11/19 12:00  08/13/19 11:42





  Lioresal  PO   5 mg





  1200 NAGA   Administration





     





     





     





     


 


Baclofen  5 mg  08/11/19 18:00  08/13/19 17:53





  Lioresal  PO   5 mg





  1800 NAGA   Administration





     





     





     





     


 


Brimonidine Tartrate  1 drop  08/11/19 09:00  08/14/19 09:28





  Alphagan 0.2% Ophth Soln  R EYE   1 drp





  TID NAGA   Administration





     





     





     





     


 


Cholecalciferol  5,000 units  08/11/19 09:00  08/14/19 09:26





  Vitamin D3  PO   5,000 units





  DAILY NAGA   Administration





     





     





     





     


 


Cyclobenzaprine HCl  10 mg  08/11/19 08:02  08/12/19 04:19





  Flexeril  PO   10 mg





  Q8H PRN   Administration





  Muscle Spasm   





     





     





     


 


Docusate Sodium  100 mg  08/11/19 21:00  08/13/19 20:22





  Colace  PO   100 mg





  HS NAGA   Administration





     





     





     





     


 


Enoxaparin Sodium  40 mg  08/12/19 09:00  08/14/19 09:26





  Lovenox  SC   40 mg





  0900 NAGA   Administration





     





     





     





     


 


Famotidine  20 mg  08/11/19 09:00  08/14/19 09:27





  Pepcid  PO   20 mg





  BID NAGA   Administration





     





     





     





     


 


Fluoxetine HCl  20 mg  08/11/19 09:00  08/14/19 09:27





  Prozac  PO   20 mg





  BID NAGA   Administration





     





     





     





     


 


Gabapentin  800 mg  08/11/19 09:00  08/14/19 09:26





  Neurontin  PO   800 mg





  QID NAGA   Administration





     





     





     





     


 


Guaifenesin  200 mg  08/11/19 00:15  08/13/19 11:52





  Robitussin Sf  PO   200 mg





  Q4H PRN   Administration





  Cough   





     





     





     


 


Hyoscyamine Sulfate  0.25 mg  08/11/19 11:30  08/14/19 09:27





  Levsin  PO   0.25 mg





  ACHS NAGA   Administration





     





     





     





     


 


Ceftriaxone Sodium 2 gm/  100 mls @ 200 mls/hr  08/11/19 09:00  08/14/19 09:26





  Sodium Chloride  IVPB   100 mls





  Q24HR NAGA   Administration





     





     





     





     


 


Ipratropium Bromide  2.5 ml  08/12/19 15:00  08/14/19 06:59





  Atrovent  NEB   2.5 ml





  R2VH-NN NAGA   Administration





     





     





     





     


 


Iron/Minerals/Multivitamins  1 tab  08/11/19 17:00  08/13/19 16:35





  Theragran M  PO   1 tab





  QPM-WM NAGA   Administration





     





     





     





     


 


Latanoprost  1 drop  08/11/19 21:00  08/13/19 20:27





  Xalatan 0.005% Ophth Soln  R EYE   1 drop





  HS NAGA   Administration





     





     





     





     


 


Levalbuterol HCl  0.63 mg  08/12/19 15:00  08/14/19 06:57





  Xopenex  NEB   0.63 mg





  [id number] NAGA   Administration





     





     





     





     


 


Mesalamine  1,000 mg  08/11/19 08:03  08/13/19 20:23





  Canasa  NC   1,000 mg





  HSPRN PRN   Administration





  Bleeding   





     





     





     


 


Methylprednisolone Sodium Succinate  40 mg  08/11/19 09:00  08/14/19 09:32





  Solu-Medrol  IVP   40 mg





  DAILY NAGA   Administration





     





     





     





     


 


Mometasone Furoate/Formoterol Fumar  2 puff  08/11/19 18:30  08/14/19 07:00





  Dulera 100 Mcg/5 Mcg Inhaler  INH   2 puff





  BID-RT NAGA   Administration





     





     





     





     


 


Simethicone  240 mg  08/11/19 09:00  08/14/19 09:28





  Mylicon Chewable  PO   240 mg





  QID NAGA   Administration





     





     





     





     


 


Tramadol HCl  50 mg  08/11/19 08:03  08/11/19 11:47





  Ultram  PO   50 mg





  Q6H PRN   Administration





  Mild-Moderate Pain (1-5)   





     





     





     














- Exam


ill appearing


Eye: PERRL


ENT: moist mucosa


Neck: supple, symmetric


Heart: RRR, no murmur, no gallops, no rubs


Respiratory: rhonchi, tachypneic, wheezes.  negative: CTAB, rales


Gastrointestinal: soft, non-tender, non-distended, normal bowel sounds, no 

rigidity


Extremities: no cyanosis, 1+ LE edema


Skin: no rashes


Neurological: CN's grossly intact, normal sensation to touch, no new deficit.  

negative: speech deficit, vision deficit


Musculoskeletal: generalized weakness, diffuse muscle atrophy


Psychiatric: A&O x 3





Hosp A/P


(1) Asthma exacerbation


Code(s): J45.901 - UNSPECIFIED ASTHMA WITH (ACUTE) EXACERBATION   Status: Acute

   





(2) Complicated UTI (urinary tract infection)


Code(s): N39.0 - URINARY TRACT INFECTION, SITE NOT SPECIFIED   Status: Acute   





(3) HTN (hypertension)


Code(s): I10 - ESSENTIAL (PRIMARY) HYPERTENSION   Status: Chronic   





(4) Morbid obesity


Code(s): E66.01 - MORBID (SEVERE) OBESITY DUE TO EXCESS CALORIES   Status: 

Chronic   





(5) Multiple sclerosis, secondary progressive


Code(s): G35 - MULTIPLE SCLEROSIS   Status: Chronic   





(6) Neurogenic bladder


Code(s): N31.9 - NEUROMUSCULAR DYSFUNCTION OF BLADDER, UNSPECIFIED   Status: 

Chronic   





(7) Ulcerative colitis


Code(s): K51.90 - ULCERATIVE COLITIS, UNSPECIFIED, WITHOUT COMPLICATIONS   

Status: Chronic   





- Plan





Plan:


Pulmonology consultation, recommendations appreciated


IV steroids


IV abx with coverage for pulmonary and UTI organisms


Continue Xopenex and Ipratropium L4tsons


Add Mucinex 1200mg BID for 3 days to help patient cough out mucus deep in her 

lungs


Echo with preserved EF


CTA chest neg for PE or focal PNA


UTI culture with Klebsiella species


GI and DVT PPX


Continue current plan of care


Clinically improving on maximum medical therapy - will attempt to wean off 

oxygen 


Anticipate D/c if clinically continues to improve in the next 24-48 hours

## 2019-08-15 LAB
ANION GAP SERPL CALC-SCNC: 12 MMOL/L (ref 10–20)
BASOPHILS # BLD AUTO: 0.1 THOU/UL (ref 0–0.2)
BASOPHILS NFR BLD AUTO: 0.7 % (ref 0–1)
BUN SERPL-MCNC: 10 MG/DL (ref 9.8–20.1)
CALCIUM SERPL-MCNC: 9.3 MG/DL (ref 7.8–10.44)
CHLORIDE SERPL-SCNC: 103 MMOL/L (ref 98–107)
CO2 SERPL-SCNC: 28 MMOL/L (ref 22–29)
CREAT CL PREDICTED SERPL C-G-VRATE: 260 ML/MIN (ref 70–130)
EOSINOPHIL # BLD AUTO: 0 THOU/UL (ref 0–0.7)
EOSINOPHIL NFR BLD AUTO: 0.5 % (ref 0–10)
GLUCOSE SERPL-MCNC: 98 MG/DL (ref 70–105)
HGB BLD-MCNC: 14.4 G/DL (ref 12–16)
LYMPHOCYTES # BLD: 3.8 THOU/UL (ref 1.2–3.4)
LYMPHOCYTES NFR BLD AUTO: 43.3 % (ref 21–51)
MCH RBC QN AUTO: 28.5 PG (ref 27–31)
MCV RBC AUTO: 90.2 FL (ref 78–98)
MONOCYTES # BLD AUTO: 1.1 THOU/UL (ref 0.11–0.59)
MONOCYTES NFR BLD AUTO: 11.8 % (ref 0–10)
NEUTROPHILS # BLD AUTO: 3.9 THOU/UL (ref 1.4–6.5)
NEUTROPHILS NFR BLD AUTO: 43.7 % (ref 42–75)
PLATELET # BLD AUTO: 335 THOU/UL (ref 130–400)
POTASSIUM SERPL-SCNC: 3.1 MMOL/L (ref 3.5–5.1)
RBC # BLD AUTO: 5.06 MILL/UL (ref 4.2–5.4)
SODIUM SERPL-SCNC: 140 MMOL/L (ref 136–145)
WBC # BLD AUTO: 8.8 THOU/UL (ref 4.8–10.8)

## 2019-08-15 RX ADMIN — IPRATROPIUM BROMIDE SCH ML: 0.5 SOLUTION RESPIRATORY (INHALATION) at 07:04

## 2019-08-15 RX ADMIN — SIMETHICONE SCH MG: 80 TABLET, CHEWABLE ORAL at 14:00

## 2019-08-15 RX ADMIN — BRIMONIDINE TARTRATE SCH DRP: 2 SOLUTION OPHTHALMIC at 22:00

## 2019-08-15 RX ADMIN — Medication SCH ML: at 07:59

## 2019-08-15 RX ADMIN — BRIMONIDINE TARTRATE SCH DRP: 2 SOLUTION OPHTHALMIC at 14:17

## 2019-08-15 RX ADMIN — LACTOBACILLUS ACIDOPH-L.BULGARICUS 1 MILLION CELL CHEWABLE TABLET SCH TAB: at 11:11

## 2019-08-15 RX ADMIN — CEFTRIAXONE SCH MLS: 2 INJECTION, POWDER, FOR SOLUTION INTRAMUSCULAR; INTRAVENOUS at 07:58

## 2019-08-15 RX ADMIN — MOMETASONE FUROATE AND FORMOTEROL FUMARATE DIHYDRATE SCH PUFF: 100; 5 AEROSOL RESPIRATORY (INHALATION) at 20:16

## 2019-08-15 RX ADMIN — SIMETHICONE SCH MG: 80 TABLET, CHEWABLE ORAL at 07:56

## 2019-08-15 RX ADMIN — MOMETASONE FUROATE AND FORMOTEROL FUMARATE DIHYDRATE SCH PUFF: 100; 5 AEROSOL RESPIRATORY (INHALATION) at 07:04

## 2019-08-15 RX ADMIN — MULTIPLE VITAMINS W/ MINERALS TAB SCH TAB: TAB at 17:49

## 2019-08-15 RX ADMIN — SIMETHICONE SCH MG: 80 TABLET, CHEWABLE ORAL at 17:49

## 2019-08-15 RX ADMIN — IPRATROPIUM BROMIDE SCH ML: 0.5 SOLUTION RESPIRATORY (INHALATION) at 13:40

## 2019-08-15 RX ADMIN — BRIMONIDINE TARTRATE SCH DRP: 2 SOLUTION OPHTHALMIC at 07:55

## 2019-08-15 RX ADMIN — SIMETHICONE SCH MG: 80 TABLET, CHEWABLE ORAL at 22:16

## 2019-08-15 RX ADMIN — Medication SCH ML: at 08:03

## 2019-08-15 NOTE — PRG
DATE OF SERVICE:  08/15/2019



SERVICE:  Pulmonary Medicine.



INTERVAL HISTORY:  The patient is doing fine from respiratory standpoint.  She is

breathing comfortably.  She has no conversational dyspnea.  Otherwise, there has

been no interval change in her condition.  She is doing her best work with physical

therapy to get as strong as possible.  Otherwise, there has been no interval change

to her condition. 



PHYSICAL EXAMINATION:

VITAL SIGNS:  Afebrile, pulse 91, blood pressure 158/97, respirations 20, and

saturation 96% on 2 L nasal cannula. 

GENERAL:  The patient is awake and alert, in no apparent distress. 

LUNGS:  Very good air entry.  No prolonged expiratory phase is present.  Dependent

crackles are minimal. 

HEART:  Normal rate and regular. 

ABDOMEN:  Soft, nontender, nondistended.  Bowel sounds are positive. 

MUSCULOSKELETAL:  No cyanosis or clubbing.  There is 2+ pitting in bilateral lower

extremities. 

NEUROLOGIC:  Grossly nonfocal.



LABORATORY DATA:  WBC 8.8, hemoglobin 14.4, platelets 335,000.  Potassium 3.1.

Basic metabolic profile is otherwise unremarkable/stable.  Klebsiella is growing in

the urine, which is a pansensitive organism.  Blood cultures x2 are unremarkable. 



ASSESSMENT:  

1. Acute hypoxic respiratory failure, resolved.

2. Asthma with acute exacerbation.

3. Obstructive sleep apnea, suspected.

4. Hypokalemia.



DISCUSSION AND PLAN:  I will replace the potassium.  We will try her on a little bit

of BiPAP tonight to see if this improves her quality of rest.  If it does, she will

likely need to pursue a polysomnogram in the outpatient setting.  From purely

respiratory standpoint, the patient is stable for transition out of the hospital.  I

will continue to follow while she remains in-house for now. 







Job ID:  [id number]

## 2019-08-15 NOTE — PDOC.HOSPP
- Subjective


Encounter Date: 08/15/19


Encounter Time: 11:30


Subjective: 





Patient seen and examined. No new complaints. No overnight events





- Objective


Vital Signs & Weight: 


 Vital Signs (12 hours)











  Temp Pulse Resp BP Pulse Ox


 


 08/15/19 13:40   102 H  20   96


 


 08/15/19 13:38   102 H  20   96


 


 08/15/19 12:06  98.4 F  91  20  158/97 H  96


 


 08/15/19 08:00      97


 


 08/15/19 07:40  97.8 F  96  20  153/87 H  97


 


 08/15/19 07:05      94 L


 


 08/15/19 07:04   93  16   94 L


 


 08/15/19 07:01   93  16   94 L








 Weight











Admit Weight                   309 lb 12.8 oz


 


Weight                         309 lb 12.8 oz














I&O: 


 











 08/14/19 08/15/19 08/16/19





 06:59 06:59 06:59


 


Intake Total 1810 500 


 


Output Total 1650 2550 


 


Balance 160 -2050 











Result Diagrams: 


 08/15/19 05:22





 08/15/19 05:22





ROS





- Review of Systems


Constitutional: denies: fever, chills, sweats, weakness, malaise, other


Eyes: denies: pain, vision change, conjunctivae inflammation, eyelid 

inflammation, redness, other


ENT: denies: ear pain, ear discharge, nose pain, nose discharge, nose congestion

, mouth pain, mouth swelling, throat pain, throat swelling, other


Respiratory: denies: cough, dry, shortness of breath, hemoptysis, SOB with 

excertion, pleuritic pain, sputum, wheezing, other


Cardiovascular: denies: chest pain, palpitations, orthopnea, paroxysmal noc. 

dyspnea, edema, light headedness, other


Gastrointestinal: denies: nausea, vomitting, abdominal pain, diarrhea, 

constipation, melena, hematochezia, other


Genitourinary: denies: dysuria, frequency, incontinence, hematuria, retention, 

other


Musculoskeletal: denies: neck pain, shoulder pain, arm pain, back pain, hand 

pain, leg pain, foot pain, other


Skin: denies: rash, lesions, felton, bruising, other





- Medication


Medications: 


Active Medications











Generic Name Dose Route Start Last Admin





  Trade Name Freq  PRN Reason Stop Dose Admin


 


Hydrocodone Bitart/Acetaminophen  1 tab  08/11/19 08:03  08/13/19 08:12





  Norco 7.5/325  PO   1 tab





  Q4H PRN   Administration





  Moderate to Severe Pain (6-10)   





     





     





     


 


Acidophilus  1 tab  08/11/19 12:00  08/15/19 11:11





  Floranex  PO   1 tab





  1200 NAGA   Administration





     





     





     





     


 


Baclofen  15 mg  08/11/19 23:59  08/14/19 23:51





  Lioresal  PO   15 mg





  2359 NAGA   Administration





     





     





     





     


 


Baclofen  15 mg  08/12/19 06:00  08/15/19 05:56





  Lioresal  PO   15 mg





  0600 NAGA   Administration





     





     





     





     


 


Baclofen  5 mg  08/11/19 12:00  08/15/19 11:11





  Lioresal  PO   5 mg





  1200 NAGA   Administration





     





     





     





     


 


Baclofen  5 mg  08/11/19 18:00  08/14/19 17:48





  Lioresal  PO   5 mg





  1800 NAGA   Administration





     





     





     





     


 


Brimonidine Tartrate  1 drop  08/11/19 09:00  08/15/19 14:17





  Alphagan 0.2% Ophth Soln  R EYE   1 drp





  TID NAGA   Administration





     





     





     





     


 


Cholecalciferol  5,000 units  08/11/19 09:00  08/15/19 07:56





  Vitamin D3  PO   5,000 units





  DAILY NAGA   Administration





     





     





     





     


 


Cyclobenzaprine HCl  10 mg  08/11/19 08:02  08/12/19 04:19





  Flexeril  PO   10 mg





  Q8H PRN   Administration





  Muscle Spasm   





     





     





     


 


Docusate Sodium  100 mg  08/11/19 21:00  08/14/19 21:54





  Colace  PO   100 mg





  HS NAGA   Administration





     





     





     





     


 


Enoxaparin Sodium  40 mg  08/12/19 09:00  08/15/19 07:58





  Lovenox  SC   40 mg





  0900 NAGA   Administration





     





     





     





     


 


Famotidine  20 mg  08/11/19 09:00  08/15/19 07:57





  Pepcid  PO   20 mg





  BID NAGA   Administration





     





     





     





     


 


Fluoxetine HCl  20 mg  08/11/19 09:00  08/15/19 07:58





  Prozac  PO   20 mg





  BID NAGA   Administration





     





     





     





     


 


Gabapentin  800 mg  08/11/19 09:00  08/15/19 14:00





  Neurontin  PO   800 mg





  QID NAGA   Administration





     





     





     





     


 


Guaifenesin  200 mg  08/11/19 00:15  08/13/19 11:52





  Robitussin Sf  PO   200 mg





  Q4H PRN   Administration





  Cough   





     





     





     


 


Guaifenesin  1,200 mg  08/15/19 09:00  08/15/19 07:57





  Mucinex  PO   1,200 mg





  BID NAGA   Administration





     





     





     





     


 


Hyoscyamine Sulfate  0.25 mg  08/11/19 11:30  08/15/19 11:10





  Levsin  PO   0.25 mg





  ACHS NAGA   Administration





     





     





     





     


 


Ceftriaxone Sodium 2 gm/  100 mls @ 200 mls/hr  08/11/19 09:00  08/15/19 07:58





  Sodium Chloride  IVPB   100 mls





  Q24HR NAGA   Administration





     





     





     





     


 


Ipratropium Bromide  2.5 ml  08/12/19 15:00  08/15/19 13:40





  Atrovent  NEB   2.5 ml





  A4JE-SB NAGA   Administration





     





     





     





     


 


Iron/Minerals/Multivitamins  1 tab  08/11/19 17:00  08/14/19 16:21





  Theragran M  PO   1 tab





  QPM-WM NAGA   Administration





     





     





     





     


 


Latanoprost  1 drop  08/11/19 21:00  08/14/19 21:53





  Xalatan 0.005% Ophth Soln  R EYE   1 drop





  HS NAGA   Administration





     





     





     





     


 


Levalbuterol HCl  0.63 mg  08/14/19 19:00  08/15/19 13:38





  Xopenex  NEB   0.63 mg





  S1AV-NM NAGA   Administration





     





     





     





     


 


Mesalamine  1,000 mg  08/11/19 08:03  08/14/19 22:01





  Canasa  HI   1,000 mg





  HSPRN PRN   Administration





  Bleeding   





     





     





     


 


Methylprednisolone Sodium Succinate  40 mg  08/11/19 09:00  08/15/19 07:59





  Solu-Medrol  IVP   40 mg





  DAILY NAGA   Administration





     





     





     





     


 


Mometasone Furoate/Formoterol Fumar  2 puff  08/11/19 18:30  08/15/19 07:04





  Dulera 100 Mcg/5 Mcg Inhaler  INH   2 puff





  BID-RT NAGA   Administration





     





     





     





     


 


Simethicone  240 mg  08/11/19 09:00  08/15/19 14:00





  Mylicon Chewable  PO   240 mg





  QID NAGA   Administration





     





     





     





     


 


Sodium Chloride  10 ml  08/15/19 09:00  08/15/19 08:03





  Flush - Normal Saline  IVF   10 ml





  Q12HR NAGA   Administration





     





     





     





     


 


Tramadol HCl  50 mg  08/11/19 08:03  08/11/19 11:47





  Ultram  PO   50 mg





  Q6H PRN   Administration





  Mild-Moderate Pain (1-5)   





     





     





     














- Exam


NAD, awake alert


Eye: PERRL, anicteric sclera


ENT: normocephalic atraumatic, no oropharyngeal lesions


Neck: supple, symmetric, no JVD


Heart: RRR, no murmur, no gallops


Respiratory: CTAB, no wheezes, no rales


Gastrointestinal: soft, non-tender, non-distended


Gastrointestinal - other findings: colostomy+


Extremities: no cyanosis, no clubbing, no edema


Extremeties - other findings: rothman+


Skin: normal turgor, no lesions


Musculoskeletal: normal tone


Psychiatric: normal affect, normal behavior





Hosp A/P


(1) Asthma exacerbation


Code(s): J45.901 - UNSPECIFIED ASTHMA WITH (ACUTE) EXACERBATION   Status: Acute

   


Qualifiers: 


   Asthma severity: moderate 





(2) Colostomy in place


Code(s): Z93.3 - COLOSTOMY STATUS   Status: Chronic   





(3) Functional quadriplegia secondary to MS


Code(s): G35 - MULTIPLE SCLEROSIS; R53.2 - FUNCTIONAL QUADRIPLEGIA   Status: 

Chronic   





(4) HTN (hypertension)


Code(s): I10 - ESSENTIAL (PRIMARY) HYPERTENSION   Status: Chronic   





(5) Morbid obesity


Code(s): E66.01 - MORBID (SEVERE) OBESITY DUE TO EXCESS CALORIES   Status: 

Chronic   





(6) Multiple sclerosis, secondary progressive


Code(s): G35 - MULTIPLE SCLEROSIS   Status: Chronic   





(7) Neurogenic bladder


Code(s): N31.9 - NEUROMUSCULAR DYSFUNCTION OF BLADDER, UNSPECIFIED   Status: 

Chronic   





(8) Ulcerative colitis


Code(s): K51.90 - ULCERATIVE COLITIS, UNSPECIFIED, WITHOUT COMPLICATIONS   

Status: Chronic   





- Plan


old records reviewed/req, respiratory therapy





continue to wean off oxygen


medication reviewed as above


symptomatic treatment


currently on optimum medical therapy for asthma


will change to PO prednisone


will change rocephin to omnicef


discharge planning

## 2019-08-16 LAB
ANION GAP SERPL CALC-SCNC: 12 MMOL/L (ref 10–20)
BASOPHILS # BLD AUTO: 0.1 THOU/UL (ref 0–0.2)
BASOPHILS NFR BLD AUTO: 0.9 % (ref 0–1)
BUN SERPL-MCNC: 11 MG/DL (ref 9.8–20.1)
CALCIUM SERPL-MCNC: 9.6 MG/DL (ref 7.8–10.44)
CHLORIDE SERPL-SCNC: 103 MMOL/L (ref 98–107)
CO2 SERPL-SCNC: 28 MMOL/L (ref 22–29)
CREAT CL PREDICTED SERPL C-G-VRATE: 242 ML/MIN (ref 70–130)
EOSINOPHIL # BLD AUTO: 0.1 THOU/UL (ref 0–0.7)
EOSINOPHIL NFR BLD AUTO: 0.6 % (ref 0–10)
GLUCOSE SERPL-MCNC: 108 MG/DL (ref 70–105)
HGB BLD-MCNC: 15.1 G/DL (ref 12–16)
LYMPHOCYTES # BLD: 4.8 THOU/UL (ref 1.2–3.4)
LYMPHOCYTES NFR BLD AUTO: 39.3 % (ref 21–51)
MCH RBC QN AUTO: 29.3 PG (ref 27–31)
MCV RBC AUTO: 90 FL (ref 78–98)
MONOCYTES # BLD AUTO: 1.3 THOU/UL (ref 0.11–0.59)
MONOCYTES NFR BLD AUTO: 11 % (ref 0–10)
NEUTROPHILS # BLD AUTO: 5.8 THOU/UL (ref 1.4–6.5)
NEUTROPHILS NFR BLD AUTO: 48.2 % (ref 42–75)
PLATELET # BLD AUTO: 397 THOU/UL (ref 130–400)
POTASSIUM SERPL-SCNC: 4.2 MMOL/L (ref 3.5–5.1)
RBC # BLD AUTO: 5.17 MILL/UL (ref 4.2–5.4)
SODIUM SERPL-SCNC: 139 MMOL/L (ref 136–145)
WBC # BLD AUTO: 12.1 THOU/UL (ref 4.8–10.8)

## 2019-08-16 RX ADMIN — Medication SCH ML: at 09:23

## 2019-08-16 RX ADMIN — MOMETASONE FUROATE AND FORMOTEROL FUMARATE DIHYDRATE SCH PUFF: 100; 5 AEROSOL RESPIRATORY (INHALATION) at 18:23

## 2019-08-16 RX ADMIN — IPRATROPIUM BROMIDE SCH ML: 0.5 SOLUTION RESPIRATORY (INHALATION) at 13:22

## 2019-08-16 RX ADMIN — SIMETHICONE SCH MG: 80 TABLET, CHEWABLE ORAL at 20:27

## 2019-08-16 RX ADMIN — Medication SCH ML: at 20:30

## 2019-08-16 RX ADMIN — MOMETASONE FUROATE AND FORMOTEROL FUMARATE DIHYDRATE SCH PUFF: 100; 5 AEROSOL RESPIRATORY (INHALATION) at 07:13

## 2019-08-16 RX ADMIN — MESALAMINE PRN MG: 1000 SUPPOSITORY RECTAL at 01:00

## 2019-08-16 RX ADMIN — IPRATROPIUM BROMIDE SCH ML: 0.5 SOLUTION RESPIRATORY (INHALATION) at 07:11

## 2019-08-16 RX ADMIN — IPRATROPIUM BROMIDE SCH ML: 0.5 SOLUTION RESPIRATORY (INHALATION) at 00:29

## 2019-08-16 RX ADMIN — BRIMONIDINE TARTRATE SCH DRP: 2 SOLUTION OPHTHALMIC at 09:20

## 2019-08-16 RX ADMIN — SIMETHICONE SCH MG: 80 TABLET, CHEWABLE ORAL at 09:21

## 2019-08-16 RX ADMIN — SIMETHICONE SCH MG: 80 TABLET, CHEWABLE ORAL at 17:43

## 2019-08-16 RX ADMIN — MESALAMINE PRN MG: 1000 SUPPOSITORY RECTAL at 22:20

## 2019-08-16 RX ADMIN — BRIMONIDINE TARTRATE SCH DRP: 2 SOLUTION OPHTHALMIC at 20:39

## 2019-08-16 RX ADMIN — LACTOBACILLUS ACIDOPH-L.BULGARICUS 1 MILLION CELL CHEWABLE TABLET SCH TAB: at 12:42

## 2019-08-16 RX ADMIN — SIMETHICONE SCH MG: 80 TABLET, CHEWABLE ORAL at 12:43

## 2019-08-16 RX ADMIN — MULTIPLE VITAMINS W/ MINERALS TAB SCH TAB: TAB at 17:43

## 2019-08-16 RX ADMIN — BRIMONIDINE TARTRATE SCH DRP: 2 SOLUTION OPHTHALMIC at 17:42

## 2019-08-16 NOTE — PRG
DATE OF SERVICE:  08/16/2019



SERVICE:  Pulmonary Medicine.



INTERVAL HISTORY:  The patient is doing fine from respiratory standpoint.  Breathing

comfortably.  She only used the BiPAP for about an hour and a half last night.  She

was able to put it on and go to sleep, but then she woke up feeling like she was

suffocating.  As such, she took that thing off.  She had a good day this morning,

but then had a little bit of a coughing fit today, and she started having increasing

shortness of breath since then.  She is having some conversational dyspnea.

Otherwise, she refused taking her Lasix last night. 



PHYSICAL EXAMINATION:

VITAL SIGNS:  Afebrile.  Pulse 97, blood pressure 160/94, respirations 20,

saturation 94% on room air. 

GENERAL:  The patient is awake and alert, in no apparent distress. 

LUNGS:  Crackles are present dependently.  There are rhonchi present, which are more

pronounced on exhalation.  With forced exhalation, she has very large airway

rhonchi.  My suspicion is that we are dealing with tracheobronchomalacia. 



LABORATORY DATA:  WBC 12.1, hemoglobin 15.1, platelets 397,000.  Basic metabolic

profile is otherwise unremarkable.  Urine is growing Klebsiella pneumoniae. 



ASSESSMENT:  

1. Acute hypoxic respiratory failure, resolved.

2. Asthma with acute exacerbation.

3. Tracheobronchomalacia, suspected.

4. Obstructive sleep apnea, suspected.

5. Hypokalemia, resolved.



DISCUSSION AND PLAN:  The patient is stable for transition out of the hospital.  She

will need 10 days of steroids and 5 days of antibiotic.  I would like for her to

have an outpatient oximetry study.  She refused the Lasix last night, though I do

think she has a little bit of diastolic dysfunction and volume overload based on the

CT scan.  I talked to the patient about taking a dose of Lasix and she is not

interested in that.  I believe the patient is at high risk for having sleep apnea

and tracheobronchomalacia.  Both of these can contribute to some of the symptoms

that she is experiencing.  She does not believe that she has sleep apnea and is not

interested in pursuing a polysomnogram.  As a compromise, I requested that she

undergo a nocturnal oximetry study, which she has consented to.  If this shows

intermittent desaturation, polysomnogram will be pursued in the outpatient setting.

I would like for her to follow up with Dr. Arrieta or myself in 2 to 3 weeks in the

outpatient setting with results of the nocturnal oximetry.  At this point, she has

no further requirements for inpatient Pulmonary Critical Care opinion, and I will

sign off.  Please call with additional questions or concerns. 







Job ID:  [id number]

## 2019-08-16 NOTE — PDOC.HOSPP
- Subjective


Encounter Date: 08/16/19


Encounter Time: 10:15


Subjective: 





breathing better


used bipap only for an hour or so








- Objective


Vital Signs & Weight: 


 Vital Signs (12 hours)











  Temp Pulse Resp BP BP Pulse Ox Pulse Ox


 


 08/16/19 12:00  98.8 F  97  20  160/94 H   94 L 


 


 08/16/19 10:33       95 


 


 08/16/19 09:36        95


 


 08/16/19 08:00  98.3 F  99  18  127/85   96 


 


 08/16/19 07:11       98 


 


 08/16/19 07:07   87     98 


 


 08/16/19 04:56  97.7 F  95  20   149/94 H  97 














  Pulse Ox


 


 08/16/19 12:00 


 


 08/16/19 10:33 


 


 08/16/19 09:36  95


 


 08/16/19 08:00 


 


 08/16/19 07:11 


 


 08/16/19 07:07 


 


 08/16/19 04:56 








 Weight











Admit Weight                   309 lb 12.8 oz


 


Weight                         309 lb 12.8 oz














I&O: 


 











 08/15/19 08/16/19 08/17/19





 06:59 06:59 06:59


 


Intake Total 500 1910 


 


Output Total 2550 2900 


 


Balance -2050 -990 











Result Diagrams: 


 08/16/19 05:30





 08/16/19 05:30





ROS





- Medication


Medications: 


Active Medications











Generic Name Dose Route Start Last Admin





  Trade Name Freq  PRN Reason Stop Dose Admin


 


Hydrocodone Bitart/Acetaminophen  1 tab  08/11/19 08:03  08/13/19 08:12





  Norco 7.5/325  PO   1 tab





  Q4H PRN   Administration





  Moderate to Severe Pain (6-10)   





     





     





     


 


Acidophilus  1 tab  08/11/19 12:00  08/16/19 12:42





  Floranex  PO   1 tab





  1200 NAGA   Administration





     





     





     





     


 


Baclofen  15 mg  08/11/19 23:59  08/16/19 00:59





  Lioresal  PO   15 mg





  2359 NAGA   Administration





     





     





     





     


 


Baclofen  15 mg  08/12/19 06:00  08/16/19 05:46





  Lioresal  PO   15 mg





  0600 NAGA   Administration





     





     





     





     


 


Baclofen  5 mg  08/11/19 12:00  08/16/19 12:42





  Lioresal  PO   5 mg





  1200 NAGA   Administration





     





     





     





     


 


Baclofen  5 mg  08/11/19 18:00  08/15/19 17:48





  Lioresal  PO   5 mg





  1800 NAGA   Administration





     





     





     





     


 


Brimonidine Tartrate  1 drop  08/11/19 09:00  08/16/19 09:20





  Alphagan 0.2% Ophth Soln  R EYE   1 drp





  TID NAGA   Administration





     





     





     





     


 


Cefdinir  300 mg  08/15/19 21:00  08/16/19 09:22





  Omnicef  PO   300 mg





  BID NAGA   Administration





     





     





     





     


 


Cholecalciferol  5,000 units  08/11/19 09:00  08/16/19 09:22





  Vitamin D3  PO   5,000 units





  DAILY NAGA   Administration





     





     





     





     


 


Cyclobenzaprine HCl  10 mg  08/11/19 08:02  08/12/19 04:19





  Flexeril  PO   10 mg





  Q8H PRN   Administration





  Muscle Spasm   





     





     





     


 


Docusate Sodium  100 mg  08/11/19 21:00  08/15/19 21:59





  Colace  PO   100 mg





  HS NAGA   Administration





     





     





     





     


 


Enoxaparin Sodium  40 mg  08/12/19 09:00  08/16/19 09:21





  Lovenox  SC   40 mg





  0900 NAGA   Administration





     





     





     





     


 


Famotidine  20 mg  08/11/19 09:00  08/16/19 09:22





  Pepcid  PO   20 mg





  BID NAGA   Administration





     





     





     





     


 


Fluoxetine HCl  20 mg  08/11/19 09:00  08/16/19 09:22





  Prozac  PO   20 mg





  BID NAGA   Administration





     





     





     





     


 


Gabapentin  800 mg  08/11/19 09:00  08/16/19 12:43





  Neurontin  PO   800 mg





  QID NAGA   Administration





     





     





     





     


 


Guaifenesin  200 mg  08/11/19 00:15  08/13/19 11:52





  Robitussin Sf  PO   200 mg





  Q4H PRN   Administration





  Cough   





     





     





     


 


Guaifenesin  1,200 mg  08/15/19 09:00  08/16/19 09:21





  Mucinex  PO   1,200 mg





  BID NAGA   Administration





     





     





     





     


 


Hyoscyamine Sulfate  0.25 mg  08/11/19 11:30  08/16/19 12:42





  Levsin  PO   0.25 mg





  ACHS NAGA   Administration





     





     





     





     


 


Ipratropium Bromide  2.5 ml  08/12/19 15:00  08/16/19 07:11





  Atrovent  NEB   2.5 ml





  [id number]-JA NAGA   Administration





     





     





     





     


 


Iron/Minerals/Multivitamins  1 tab  08/11/19 17:00  08/15/19 17:49





  Theragran M  PO   1 tab





  QPM-WM NAGA   Administration





     





     





     





     


 


Latanoprost  1 drop  08/11/19 21:00  08/15/19 22:00





  Xalatan 0.005% Ophth Soln  R EYE   1 drop





  HS NAGA   Administration





     





     





     





     


 


Levalbuterol HCl  0.63 mg  08/14/19 19:00  08/16/19 07:07





  Xopenex  NEB   0.63 mg





  U5YS-IO NAGA   Administration





     





     





     





     


 


Mesalamine  1,000 mg  08/11/19 08:03  08/16/19 01:00





  Canasa  WV   1,000 mg





  HSPRN PRN   Administration





  Bleeding   





     





     





     


 


Mometasone Furoate/Formoterol Fumar  2 puff  08/11/19 18:30  08/16/19 07:13





  Dulera 100 Mcg/5 Mcg Inhaler  INH   2 puff





  BID-RT NAGA   Administration





     





     





     





     


 


Prednisone  40 mg  08/16/19 08:00  08/16/19 09:20





  Prednisone  PO   40 mg





  QAM-WM NAGA   Administration





     





     





     





     


 


Simethicone  240 mg  08/11/19 09:00  08/16/19 12:43





  Mylicon Chewable  PO   240 mg





  QID NAGA   Administration





     





     





     





     


 


Sodium Chloride  10 ml  08/15/19 09:00  08/16/19 09:23





  Flush - Normal Saline  IVF   10 ml





  Q12HR NAGA   Administration





     





     





     





     


 


Tramadol HCl  50 mg  08/11/19 08:03  08/11/19 11:47





  Ultram  PO   50 mg





  Q6H PRN   Administration





  Mild-Moderate Pain (1-5)   





     





     





     














- Exam


NAD, awake alert


Eye: PERRL, anicteric sclera


ENT: normocephalic atraumatic, no oropharyngeal lesions


Neck: supple, no JVD


Heart: RRR, no gallops


Respiratory: no wheezes, no rales, rhonchi


Gastrointestinal: soft, non-tender, normal bowel sounds


Gastrointestinal - other findings: colostomy has stool in it


Extremities: no clubbing, 1+ LE edema


Neurological: CN's grossly intact, no focal deficits


Psychiatric: normal affect, A&O x 3





Hosp A/P


(1) Asthma exacerbation


Code(s): J45.901 - UNSPECIFIED ASTHMA WITH (ACUTE) EXACERBATION   Status: Acute

   


Qualifiers: 


   Asthma severity: moderate 





(2) Functional quadriplegia secondary to MS


Code(s): G35 - MULTIPLE SCLEROSIS; R53.2 - FUNCTIONAL QUADRIPLEGIA   Status: 

Chronic   





(3) HTN (hypertension)


Code(s): I10 - ESSENTIAL (PRIMARY) HYPERTENSION   Status: Chronic   





(4) Morbid obesity


Code(s): E66.01 - MORBID (SEVERE) OBESITY DUE TO EXCESS CALORIES   Status: 

Chronic   





(5) Multiple sclerosis, secondary progressive


Code(s): G35 - MULTIPLE SCLEROSIS   Status: Chronic   





(6) Neurogenic bladder


Code(s): N31.9 - NEUROMUSCULAR DYSFUNCTION OF BLADDER, UNSPECIFIED   Status: 

Chronic   





(7) Ulcerative colitis


Code(s): K51.90 - ULCERATIVE COLITIS, UNSPECIFIED, WITHOUT COMPLICATIONS   

Status: Chronic   





- Plan





may dc home if ok with pulmonary


to f/u with  neurologist at [facility] for progressive MS with a new option 

available per patient for treatment


hemostable


continue baclofen, flexeril, motrin, omnicef, prednisone, prozac, neurontin and 

trazadone.

## 2019-08-17 VITALS — DIASTOLIC BLOOD PRESSURE: 95 MMHG | TEMPERATURE: 98.2 F | SYSTOLIC BLOOD PRESSURE: 138 MMHG

## 2019-08-17 RX ADMIN — IPRATROPIUM BROMIDE SCH ML: 0.5 SOLUTION RESPIRATORY (INHALATION) at 07:05

## 2019-08-17 RX ADMIN — SIMETHICONE SCH MG: 80 TABLET, CHEWABLE ORAL at 12:12

## 2019-08-17 RX ADMIN — LACTOBACILLUS ACIDOPH-L.BULGARICUS 1 MILLION CELL CHEWABLE TABLET SCH TAB: at 11:19

## 2019-08-17 RX ADMIN — IPRATROPIUM BROMIDE SCH ML: 0.5 SOLUTION RESPIRATORY (INHALATION) at 00:03

## 2019-08-17 RX ADMIN — MOMETASONE FUROATE AND FORMOTEROL FUMARATE DIHYDRATE SCH PUFF: 100; 5 AEROSOL RESPIRATORY (INHALATION) at 07:00

## 2019-08-17 RX ADMIN — Medication SCH: at 08:21

## 2019-08-17 RX ADMIN — BRIMONIDINE TARTRATE SCH DRP: 2 SOLUTION OPHTHALMIC at 08:19

## 2019-08-17 RX ADMIN — SIMETHICONE SCH MG: 80 TABLET, CHEWABLE ORAL at 08:18

## 2019-08-17 NOTE — DIS
DATE OF ADMISSION:  08/10/2019



DATE OF DISCHARGE:  08/17/2019



DISCHARGE DISPOSITION:  To home.



PRIMARY DISCHARGE DIAGNOSES:  

1. Asthma exacerbation, resolved.

2. Functional quadriplegia secondary to multiple sclerosis.

3. Possible obstructive sleep apnea.

4. Hypertension.

5. Morbid obesity.

6. Secondary progressive multiple sclerosis.

7. Neurogenic bladder.

8. Ulcerative colitis with colostomy.



PROCEDURES DONE DURING HOSPITALIZATION:  CT angio chest done showed no segmental

pulmonary arterial filling defect was seen, subacute T8 superior endplate

compression fracture.  No retropulsion was seen.  Echo with 2D Doppler showed

ejection fraction of 60% to 65%, mild concentric LVH.  Blood cultures x2, no 
growth.

 Urine culture grew Klebsiella sensitive to all antibiotics except 
nitrofurantoin.

H and H 15 and 46, platelet count is 397.  BUN 11, creatinine 0.5.  Troponin x1

negative.  BNP 16.2, CK-MB 0.6. 



DISCHARGE MEDICATIONS:  

1. Baclofen 15 mg at 6:00 a.m. and midnight and 5 mg at 12 noon and 6:00 p.m.

2. Alphagan eye drops to right eye three times daily.

3. Vitamin D3 5000 units p.o. daily.

4. Flexeril 10 mg three times daily.

5. Colace 100 mg p.o. at bedtime.

6. Prozac 20 mg twice daily.

7. Advair inhaler twice daily.

8. Gabapentin 800 mg p.o. 4 times daily.

9. Probiotic one capsule daily.

10. Meclizine p.r.n.

11. Mesalamine 1000 mg per rectal at bedtime p.r.n.

12. Lyrica 50 mg three times daily.

13. Ultram p.r.n. for pain.

14. Omnicef 300 mg p.o. twice daily for another 2 days.

15. Prednisone 40 mg p.o. daily for another 2 days.

16. Trazodone 50 mg p.o. at bedtime p.r.n. for insomnia.



ALLERGIES:  TO CLARITHROMYCIN.



INPATIENT CONSULT:  Dr. Arrieta/Dr. Mariscal for Pulmonology.



DISCHARGE PLAN:  The patient to follow up with Dr. Arrieta in 2 to 3 weeks for

oximetry studies.  She needs to follow up with her primary care physician in 1 
week

and Dr. Valdes her neurologist at [facility] in 2 weeks. 



BRIEF COURSE DURING HOSPITALIZATION:  The patient initially got admitted on the 
11th

with complaints of shortness of breath and generalized weakness.  She was 
admitted

to medical floor for complicated UTI, sepsis, asthma with acute exacerbation.  
The

patient also has advanced multiple sclerosis and is bed-bound with functional

quadriplegia.  She has had consultation with Dr. Arrieta for Pulmonology.  The

patient was placed on steroids along with broad-spectrum antibiotics.  She has

responded well to above measures.  Echo showed good ejection fraction.  The 
patient

has multiple caregivers at home.  She has been cleared for discharge by Dr. Mariscal.

 The patient needs to continue her antibiotics and steroids for another 2 days.
  She

has been advised to have oximetry studies to rule out obstructive sleep

apnea/obesity hypoventilation syndrome with Dr. Arrieta in 2 weeks.  The 
patient did

not want to have a sleep study done.  She also needs to follow up with her

neurologist, Dr. Valdes in 2 to 3 weeks.  She is otherwise hemodynamically 
stable

prior to discharge.  She is eating well as well.  Please note, I have seen and

examined the patient on the day of discharge. 







Job ID:  [id number]



MTDD

## 2019-08-17 NOTE — EKG
Test Reason : 

Blood Pressure : ***/*** mmHG

Vent. Rate : 112 BPM     Atrial Rate : 112 BPM

   P-R Int : 156 ms          QRS Dur : 096 ms

    QT Int : 348 ms       P-R-T Axes : 034 000 -14 degrees

   QTc Int : 475 ms

 

Sinus tachycardia with Premature atrial complexes

T wave abnormality, consider inferior ischemia

Abnormal ECG

 

Confirmed by RACHEL LEMOS, OMAR ([id number]),  JOSE ANTONIO BAÑUELOS ([id number]) on 8/17/2019 10:08:39 AM

 

Referred By:             Confirmed By:OMAR RAMOS MD

## 2019-08-22 ENCOUNTER — HOSPITAL ENCOUNTER (INPATIENT)
Dept: HOSPITAL 92 - ERS | Age: 59
LOS: 10 days | Discharge: HOME HEALTH | DRG: 871 | End: 2019-09-01
Attending: INTERNAL MEDICINE | Admitting: INTERNAL MEDICINE
Payer: MEDICARE

## 2019-08-22 VITALS — BODY MASS INDEX: 43.4 KG/M2

## 2019-08-22 DIAGNOSIS — Z93.3: ICD-10-CM

## 2019-08-22 DIAGNOSIS — E66.01: ICD-10-CM
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
## 2019-08-22 DIAGNOSIS — Z74.01: ICD-10-CM

## 2019-08-22 DIAGNOSIS — Z90.49: ICD-10-CM

## 2019-08-22 DIAGNOSIS — E87.2: ICD-10-CM

## 2019-08-22 DIAGNOSIS — K51.90: ICD-10-CM

## 2019-08-22 DIAGNOSIS — I05.0: ICD-10-CM

## 2019-08-22 DIAGNOSIS — J44.1: ICD-10-CM

## 2019-08-22 DIAGNOSIS — J20.9: ICD-10-CM

## 2019-08-22 DIAGNOSIS — R53.2: ICD-10-CM

## 2019-08-22 DIAGNOSIS — G89.4: ICD-10-CM

## 2019-08-22 DIAGNOSIS — J44.0: ICD-10-CM
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
## 2019-08-22 DIAGNOSIS — A41.9: Primary | ICD-10-CM

## 2019-08-22 DIAGNOSIS — J96.01: ICD-10-CM

## 2019-08-22 DIAGNOSIS — N39.0: ICD-10-CM

## 2019-08-22 DIAGNOSIS — Z98.51: ICD-10-CM

## 2019-08-22 DIAGNOSIS — I89.0: ICD-10-CM

## 2019-08-22 DIAGNOSIS — H54.40: ICD-10-CM

## 2019-08-22 DIAGNOSIS — T83.511A: ICD-10-CM

## 2019-08-22 LAB
ALBUMIN SERPL BCG-MCNC: 3.8 G/DL (ref 3.5–5)
ALP SERPL-CCNC: 106 U/L (ref 40–150)
ALT SERPL W P-5'-P-CCNC: 63 U/L (ref 8–55)
ANALYZER IN CARDIO: (no result)
ANION GAP SERPL CALC-SCNC: 19 MMOL/L (ref 10–20)
AST SERPL-CCNC: 49 U/L (ref 5–34)
BACTERIA UR QL AUTO: (no result) HPF
BASE EXCESS STD BLDA CALC-SCNC: 1.9 MEQ/L
BASOPHILS # BLD AUTO: 0.1 THOU/UL (ref 0–0.2)
BASOPHILS NFR BLD AUTO: 0.6 % (ref 0–1)
BILIRUB SERPL-MCNC: 0.6 MG/DL (ref 0.2–1.2)
BUN SERPL-MCNC: (no result) MG/DL (ref 9.8–20.1)
CA-I BLDA-SCNC: 1.2 MMOL/L (ref 1.12–1.3)
CALCIUM SERPL-MCNC: 9.7 MG/DL (ref 7.8–10.44)
CHLORIDE SERPL-SCNC: 102 MMOL/L (ref 98–107)
CO2 SERPL-SCNC: 20 MMOL/L (ref 22–29)
CREAT CL PREDICTED SERPL C-G-VRATE: 0 ML/MIN (ref 70–130)
EOSINOPHIL # BLD AUTO: 0.4 THOU/UL (ref 0–0.7)
EOSINOPHIL NFR BLD AUTO: 3.1 % (ref 0–10)
GLOBULIN SER CALC-MCNC: 3.7 G/DL (ref 2.4–3.5)
GLUCOSE SERPL-MCNC: 102 MG/DL (ref 70–105)
HCO3 BLDA-SCNC: 25.2 MEQ/L (ref 22–28)
HCT VFR BLDA CALC: 49 % (ref 36–47)
HGB BLD-MCNC: 16.6 G/DL (ref 12–16)
HGB BLDA-MCNC: 16.6 G/DL (ref 12–16)
LEUKOCYTE ESTERASE UR QL STRIP.AUTO: 500 LEU/UL
LYMPHOCYTES # BLD: 3.3 THOU/UL (ref 1.2–3.4)
LYMPHOCYTES NFR BLD AUTO: 24.3 % (ref 21–51)
MCH RBC QN AUTO: 29.5 PG (ref 27–31)
MCV RBC AUTO: 88.7 FL (ref 78–98)
MONOCYTES # BLD AUTO: 1 THOU/UL (ref 0.11–0.59)
MONOCYTES NFR BLD AUTO: 7.5 % (ref 0–10)
NEUTROPHILS # BLD AUTO: 8.6 THOU/UL (ref 1.4–6.5)
NEUTROPHILS NFR BLD AUTO: 64.5 % (ref 42–75)
PCO2 BLDA: 35.8 MMHG (ref 35–45)
PH BLDA: 7.47 [PH] (ref 7.35–7.45)
PLATELET # BLD AUTO: 507 THOU/UL (ref 130–400)
PO2 BLDA: 88.5 MMHG (ref 80–100)
POTASSIUM BLD-SCNC: 3.33 MMOL/L (ref 3.7–5.3)
POTASSIUM SERPL-SCNC: 3.9 MMOL/L (ref 3.5–5.1)
PROT UR STRIP.AUTO-MCNC: 10 MG/DL
RBC # BLD AUTO: 5.64 MILL/UL (ref 4.2–5.4)
RBC UR QL AUTO: (no result) HPF (ref 0–3)
SODIUM SERPL-SCNC: 137 MMOL/L (ref 136–145)
SPECIMEN DRAWN FROM PATIENT: (no result)
WBC # BLD AUTO: 13.3 THOU/UL (ref 4.8–10.8)
WBC UR QL AUTO: (no result) HPF (ref 0–3)
YEAST # UR AUTO: (no result) HPF

## 2019-08-22 PROCEDURE — 87040 BLOOD CULTURE FOR BACTERIA: CPT

## 2019-08-22 PROCEDURE — 96367 TX/PROPH/DG ADDL SEQ IV INF: CPT

## 2019-08-22 PROCEDURE — 71045 X-RAY EXAM CHEST 1 VIEW: CPT

## 2019-08-22 PROCEDURE — 94664 DEMO&/EVAL PT USE INHALER: CPT

## 2019-08-22 PROCEDURE — 85379 FIBRIN DEGRADATION QUANT: CPT

## 2019-08-22 PROCEDURE — 87086 URINE CULTURE/COLONY COUNT: CPT

## 2019-08-22 PROCEDURE — 87186 SC STD MICRODIL/AGAR DIL: CPT

## 2019-08-22 PROCEDURE — 84145 PROCALCITONIN (PCT): CPT

## 2019-08-22 PROCEDURE — 86140 C-REACTIVE PROTEIN: CPT

## 2019-08-22 PROCEDURE — 96365 THER/PROPH/DIAG IV INF INIT: CPT

## 2019-08-22 PROCEDURE — 81015 MICROSCOPIC EXAM OF URINE: CPT

## 2019-08-22 PROCEDURE — 85025 COMPLETE CBC W/AUTO DIFF WBC: CPT

## 2019-08-22 PROCEDURE — 82805 BLOOD GASES W/O2 SATURATION: CPT

## 2019-08-22 PROCEDURE — 80048 BASIC METABOLIC PNL TOTAL CA: CPT

## 2019-08-22 PROCEDURE — 83880 ASSAY OF NATRIURETIC PEPTIDE: CPT

## 2019-08-22 PROCEDURE — 36415 COLL VENOUS BLD VENIPUNCTURE: CPT

## 2019-08-22 PROCEDURE — 83605 ASSAY OF LACTIC ACID: CPT

## 2019-08-22 PROCEDURE — 94640 AIRWAY INHALATION TREATMENT: CPT

## 2019-08-22 PROCEDURE — 80053 COMPREHEN METABOLIC PANEL: CPT

## 2019-08-22 PROCEDURE — 81003 URINALYSIS AUTO W/O SCOPE: CPT

## 2019-08-22 PROCEDURE — 96375 TX/PRO/DX INJ NEW DRUG ADDON: CPT

## 2019-08-22 PROCEDURE — 83735 ASSAY OF MAGNESIUM: CPT

## 2019-08-22 PROCEDURE — 84484 ASSAY OF TROPONIN QUANT: CPT

## 2019-08-22 PROCEDURE — 87077 CULTURE AEROBIC IDENTIFY: CPT

## 2019-08-22 NOTE — RAD
RADIOGRAPH CHEST 1 VIEW:

 

Date: 8/22/2019.

Time: 4:04 p.m.

 

HISTORY: 

A 58-year-old female with dyspnea.

 

COMPARISON: 

8/12/2019.

 

FINDINGS:

Lung volumes are very low on the current study.  There is a new focal small airspace opacity in the l
eft lower lung zone.  There is blunting of the left lateral costophrenic angle consistent with a smal
l left pleural effusion.  There is no pulmonary edema or pneumothorax.  

 

IMPRESSION:

1.  New small airspace density in the left mid-lower lung zone, either atelectasis, pneumonia, or asp
iration.

 

2.  Small left pleural effusion.

 

 

JN []

 

POS: [facility]

## 2019-08-23 LAB
ALBUMIN SERPL BCG-MCNC: 3.3 G/DL (ref 3.5–5)
ALP SERPL-CCNC: 88 U/L (ref 40–150)
ALT SERPL W P-5'-P-CCNC: 76 U/L (ref 8–55)
ANION GAP SERPL CALC-SCNC: 10 MMOL/L (ref 10–20)
AST SERPL-CCNC: 50 U/L (ref 5–34)
BASOPHILS # BLD AUTO: 0.1 THOU/UL (ref 0–0.2)
BASOPHILS NFR BLD AUTO: 0.7 % (ref 0–1)
BILIRUB SERPL-MCNC: 0.7 MG/DL (ref 0.2–1.2)
BUN SERPL-MCNC: (no result) MG/DL (ref 9.8–20.1)
CALCIUM SERPL-MCNC: 8.8 MG/DL (ref 7.8–10.44)
CHLORIDE SERPL-SCNC: 104 MMOL/L (ref 98–107)
CO2 SERPL-SCNC: 27 MMOL/L (ref 22–29)
CREAT CL PREDICTED SERPL C-G-VRATE: 221 ML/MIN (ref 70–130)
EOSINOPHIL # BLD AUTO: 0.5 THOU/UL (ref 0–0.7)
EOSINOPHIL NFR BLD AUTO: 4.8 % (ref 0–10)
GLOBULIN SER CALC-MCNC: 2.7 G/DL (ref 2.4–3.5)
GLUCOSE SERPL-MCNC: 99 MG/DL (ref 70–105)
HGB BLD-MCNC: 14.4 G/DL (ref 12–16)
LYMPHOCYTES # BLD: 2.8 THOU/UL (ref 1.2–3.4)
LYMPHOCYTES NFR BLD AUTO: 25.8 % (ref 21–51)
MCH RBC QN AUTO: 28.6 PG (ref 27–31)
MCV RBC AUTO: 90.7 FL (ref 78–98)
MONOCYTES # BLD AUTO: 1 THOU/UL (ref 0.11–0.59)
MONOCYTES NFR BLD AUTO: 9.3 % (ref 0–10)
NEUTROPHILS # BLD AUTO: 6.4 THOU/UL (ref 1.4–6.5)
NEUTROPHILS NFR BLD AUTO: 59.4 % (ref 42–75)
PLATELET # BLD AUTO: 403 THOU/UL (ref 130–400)
POTASSIUM SERPL-SCNC: 3.4 MMOL/L (ref 3.5–5.1)
RBC # BLD AUTO: 5.02 MILL/UL (ref 4.2–5.4)
SODIUM SERPL-SCNC: 138 MMOL/L (ref 136–145)
WBC # BLD AUTO: 10.8 THOU/UL (ref 4.8–10.8)

## 2019-08-23 RX ADMIN — MOMETASONE FUROATE AND FORMOTEROL FUMARATE DIHYDRATE SCH PUFF: 100; 5 AEROSOL RESPIRATORY (INHALATION) at 06:31

## 2019-08-23 RX ADMIN — SIMETHICONE SCH MG: 80 TABLET, CHEWABLE ORAL at 16:59

## 2019-08-23 RX ADMIN — SODIUM CHLORIDE SCH DRP: 50 SOLUTION/ DROPS OPHTHALMIC at 13:38

## 2019-08-23 RX ADMIN — SODIUM CHLORIDE SCH DRP: 50 SOLUTION/ DROPS OPHTHALMIC at 21:18

## 2019-08-23 RX ADMIN — SIMETHICONE SCH MG: 80 TABLET, CHEWABLE ORAL at 21:16

## 2019-08-23 RX ADMIN — SODIUM CHLORIDE SCH DRP: 50 SOLUTION/ DROPS OPHTHALMIC at 08:31

## 2019-08-23 RX ADMIN — LACTOBACILLUS ACIDOPH-L.BULGARICUS 1 MILLION CELL CHEWABLE TABLET SCH TAB: at 11:21

## 2019-08-23 RX ADMIN — SIMETHICONE SCH MG: 80 TABLET, CHEWABLE ORAL at 13:33

## 2019-08-23 RX ADMIN — MESALAMINE SCH MG: 1000 SUPPOSITORY RECTAL at 21:16

## 2019-08-23 RX ADMIN — BRIMONIDINE TARTRATE SCH DRP: 2 SOLUTION OPHTHALMIC at 08:31

## 2019-08-23 RX ADMIN — MULTIPLE VITAMINS W/ MINERALS TAB SCH TAB: TAB at 17:00

## 2019-08-23 RX ADMIN — CLOTRIMAZOLE SCH: 1 CREAM TOPICAL at 21:21

## 2019-08-23 RX ADMIN — MOMETASONE FUROATE AND FORMOTEROL FUMARATE DIHYDRATE SCH: 100; 5 AEROSOL RESPIRATORY (INHALATION) at 19:04

## 2019-08-23 RX ADMIN — SIMETHICONE SCH MG: 80 TABLET, CHEWABLE ORAL at 08:36

## 2019-08-23 RX ADMIN — NYSTATIN SCH APPLIC: 100000 POWDER TOPICAL at 08:32

## 2019-08-23 RX ADMIN — NYSTATIN SCH APPLIC: 100000 POWDER TOPICAL at 21:20

## 2019-08-23 RX ADMIN — BRIMONIDINE TARTRATE SCH DRP: 2 SOLUTION OPHTHALMIC at 21:20

## 2019-08-23 RX ADMIN — BRIMONIDINE TARTRATE SCH DRP: 2 SOLUTION OPHTHALMIC at 14:36

## 2019-08-23 RX ADMIN — SODIUM CHLORIDE SCH DRP: 50 SOLUTION/ DROPS OPHTHALMIC at 17:32

## 2019-08-23 NOTE — PDOC.HOSPP
- Subjective


Encounter Date: 08/23/19


Encounter Time: 14:25


Subjective: 


Non-productive cough is present.  Subjectively feels better with oxygen in 

place.  Chronic indwelling rothman.  Redness to nani area due to Candida.  No 

nausea or vomiting.  States does not have nebulizer or rescue inhaler at home.








- Objective


Vital Signs & Weight: 


 Vital Signs (12 hours)











  Temp Pulse Resp BP Pulse Ox


 


 08/23/19 16:13  98.3 F  112 H  18  106/74  97


 


 08/23/19 14:03   80  16  


 


 08/23/19 11:45  98.8 F  107 H  18  116/81  90 L


 


 08/23/19 08:00  97.8 F  99  16  113/78  96


 


 08/23/19 06:33   100  16  








 Weight











Weight                         302 lb 11.2 oz














I&O: 


 











 08/22/19 08/23/19 08/24/19





 06:59 06:59 06:59


 


Intake Total  100 600


 


Output Total  750 


 


Balance  -650 600











Result Diagrams: 


 08/23/19 05:42





 08/23/19 06:42





ROS





- Medication


Medications: 


Active Medications











Generic Name Dose Route Start Last Admin





  Trade Name Freq  PRN Reason Stop Dose Admin


 


Acidophilus  1 tab  08/23/19 12:00  08/23/19 11:21





  Floranex  PO   1 tab





  1200 NAGA   Administration





     





     





     





     


 


Baclofen  15 mg  08/23/19 05:00  08/23/19 05:12





  Lioresal  PO   15 mg





  0500,2300 NAGA   Administration





     





     





     





     


 


Baclofen  10 mg  08/23/19 11:00  08/23/19 11:21





  Lioresal  PO   10 mg





  1100,1700 NAGA   Administration





     





     





     





     


 


Benzonatate  100 mg  08/22/19 21:42  08/22/19 22:06





  Tessalon  PO   100 mg





  Q4H PRN   Administration





  Cough   





     





     





     


 


Brimonidine Tartrate  1 drop  08/23/19 09:00  08/23/19 14:36





  Alphagan 0.2% Ophth Soln  R EYE   1 drp





  TID NAGA   Administration





     





     





     





     


 


Cholecalciferol  5,000 units  08/23/19 09:00  08/23/19 08:23





  Vitamin D3  PO   5,000 units





  DAILY NAGA   Administration





     





     





     





     


 


Cyclobenzaprine HCl  10 mg  08/23/19 09:00  08/23/19 14:35





  Flexeril  PO   10 mg





  TID NAGA   Administration





     





     





     





     


 


Enoxaparin Sodium  40 mg  08/23/19 09:00  08/23/19 08:25





  Lovenox  SC   40 mg





  0900 NAGA   Administration





     





     





     





     


 


Famotidine  20 mg  08/23/19 09:00  08/23/19 08:25





  Pepcid  PO   20 mg





  BID NAGA   Administration





     





     





     





     


 


Fluconazole  200 mg  08/23/19 09:00  08/23/19 08:23





  Diflucan  PO  08/24/19 09:01  200 mg





  DAILY NAGA   Administration





     





     





     





     


 


Fluoxetine HCl  20 mg  08/23/19 09:00  08/23/19 08:25





  Prozac  PO   20 mg





  BID NAGA   Administration





     





     





     





     


 


Gabapentin  800 mg  08/23/19 09:00  08/23/19 13:36





  Neurontin  PO   800 mg





  QID NAGA   Administration





     





     





     





     


 


Hyoscyamine Sulfate  0.25 mg  08/23/19 09:00  08/23/19 13:34





  Levsin  PO   0.25 mg





  QID NAGA   Administration





     





     





     





     


 


Piperacillin Sod/Tazobactam  100 mls @ 200 mls/hr  08/23/19 02:00  08/23/19 14:

35





  Sod 3.375 gm/ Sodium Chloride  IVPB   100 mls





  0200,0800,1400,2000 NAGA   Administration





     





     





     





     


 


Levalbuterol HCl  0.63 mg  08/23/19 07:00  08/23/19 14:03





  Xopenex  NEB   0.63 mg





  O1OG-TO NAGA   Administration





     





     





     





     


 


Mometasone Furoate/Formoterol Fumar  2 puff  08/23/19 06:30  08/23/19 06:31





  Dulera 100 Mcg/5 Mcg Inhaler  INH   1 puff





  BID-RT NAGA   Administration





     





     





     





     


 


Mupirocin  0 gm  08/22/19 21:40  08/23/19 08:33





  Bactroban 2% Ointment  TOP   1 applic





  PRN PRN   Administration





  Wound Care   





     





     





     


 


Nystatin  0 gm  08/23/19 09:00  08/23/19 08:32





  Mycostatin Powder  TOP   1 applic





  BID NAGA   Administration





     





     





     





     


 


Saccharomyces Boulardii  250 mg  08/23/19 09:00  08/23/19 08:24





  Florastor  PO   250 mg





  QAM NAGA   Administration





     





     





     





     


 


Simethicone  240 mg  08/23/19 09:00  08/23/19 13:33





  Mylicon Chewable  PO   240 mg





  QID NAGA   Administration





     





     





     





     


 


Sodium Chloride  1 drop  08/23/19 09:00  08/23/19 13:38





  Param-128 5% Oph Sol  R EYE   1 drp





  QID NAGA   Administration





     





     





     





     














- Exam


General - other findings: Resting, occasional spasms of cough during interview


Eye: anicteric sclera


ENT: moist mucosa


Neck: no lymphadenopathy


Heart - other findings: mildly tachycardic


Respiratory: rhonchi, wheezes


Gastrointestinal: soft, non-tender, non-distended


Gastrointestinal - other findings: obese


Extremeties - other findings: Chronic lymphedema


Skin - other findings: Tinea cruris


Neurological - other findings: sensory/motor deficits related to MS are 

unchanged from baseline


Psychiatric: A&O x 3





Hosp A/P


(1) Reactive airway disease with acute exacerbation


Code(s): J45.901 - UNSPECIFIED ASTHMA WITH (ACUTE) EXACERBATION   Status: Acute

   





(2) UTI (urinary tract infection)


Status: Acute   





(3) Lactic acidosis


Code(s): E87.2 - ACIDOSIS   Status: Acute   





(4) HTN (hypertension)


Code(s): I10 - ESSENTIAL (PRIMARY) HYPERTENSION   Status: Chronic   





(5) Morbid obesity


Code(s): E66.01 - MORBID (SEVERE) OBESITY DUE TO EXCESS CALORIES   Status: 

Chronic   





(6) Multiple sclerosis, secondary progressive


Code(s): G35 - MULTIPLE SCLEROSIS   Status: Chronic   





(7) Neurogenic bladder


Code(s): N31.9 - NEUROMUSCULAR DYSFUNCTION OF BLADDER, UNSPECIFIED   Status: 

Chronic   





(8) Ulcerative colitis


Code(s): K51.90 - ULCERATIVE COLITIS, UNSPECIFIED, WITHOUT COMPLICATIONS   

Status: Chronic   





- Plan





1.  ID - urine culture pending, >100K CFU GNR, presently on Zosyn.  Add topical 

antifungal, continue diflucan for tinea cruris.


2.  Pulm - oxygen/nebs/inhaled steroids.  Recheck AM CXR to follow up small mid-

lower lung zone infiltrate and pleural effusion noted on admission.  Contacted 

case management about ordering DME (nebulizer.)  Patient received machine in 

2017, does not know where it is, at this time would need to private pay for new 

machine.  Rescue inhaler prescription at the time of discharge would be helpful 

for her.


3.  Discussed with utilization review, approved for inpt, status changed.


4.  On service w/ [employer]  and has seen MsBrittnee Donny NP with [facility] 

mobile unit.


5.  Cont PT/Miriam lift/trapeze bars





High risk given age/comorbities/limitations of mobility

## 2019-08-23 NOTE — HP
PRIMARY CARE PHYSICIAN:  Areli Chisholm.



CHIEF COMPLAINT:  Shortness of breath.



HISTORY OF PRESENT ILLNESS:  Ms. Flores is a 58-year-old female who reported to the

emergency room today for shortness of breath.  The patient reports that she was just

recently discharged from [place] on Saturday the 17th, had some shortness of

breath at that time, was seen and managed over several days.  Prior to discharge,

was seen by Pulmonology and was cleared for discharge by Dr. Mariscal.  The patient

reports that she just has not felt well since discharge, reports that she does not

really feel much better as far as her breathing is concerned.  Reports that she has

had a dry cough, feels like she needs to cough, but is unable to cough anything up.

She reports that her shortness of breath is better when lying flat due to her MS.

She reports that she is able to stretch out and feels better.  The patient states

she has had increase of swelling over her last hospitalization and suffers from

chronic lymphedema.  The patient does report that she has some candidal type of rash

in the perineum, upper thigh area that she developed since last admission.  White

blood cell count 13.3.  The patient has been on steroids for her breathing which

could account for some of the increase in the white blood cell count.  Hemoglobin

16.6, hematocrit 50, platelet count 507.  Chemistry with carbon dioxide 20, BUN less

than 4, creatinine 0.6, AST 49, ALT 63.  Troponin x1 is undetectable.  BNP is less

than 10.  Urine, turbid, 2+ nitrite, leukocyte esterase, white blood cells, 1+

bacteria and 4+ yeast.  The patient did just finish a course of Omnicef.  We will

send this urine off for culture.  She denies dysuria.  ER staff did talk to Dr. Mariscal.  Dr. Wilson also talked to Dr. Garrett who discharged the patient 4 days

ago.  She was subsequently admitted to observation for further management. 



PAST MEDICAL HISTORY:  

1. Debilitating multiple sclerosis resulting in bed-bound state, hemiplegia from the

waist down. 

2. History of ulcerative colitis, status post subtotal colectomy.  The patient

reports that she has a very small portion of her sigmoid colon which does bleed

occasionally. 

3. Chronic pain syndrome.

4. Neuropathic pain.

5. Muscle spasms.

6. Asthma.

7. Elevated BMI.

8. Debility.

9. Also has a history of Raynaud syndrome, scotopic sensitivity syndrome, right eye

blindness, lymphedema. 



FAMILY HISTORY:  Noncontributory.



PAST SURGICAL HISTORY:  Colon removed, tubal ligation, left wrist surgery, has a

colostomy. 



PSYCHIATRIC HISTORY:  Depression.



SOCIAL HISTORY:  The patient lives at home.  Reports that she has roommates who help

care for her.  She does have home health, PT/OT who come to visit her at the house.

She is a former tobacco smoker. 



ALLERGIES:  CLARITHROMYCIN.



HOME MEDICATIONS:  

1. Baclofen 50 mg p.o. b.i.d.

2. Alphagan 1 drop right eye t.i.d.

3. Vitamin D 5000 units p.o. daily.

4. Flexeril 10 mg p.o. t.i.d.

5. Colace 100 mg p.o. at bedtime.

6. Prozac 20 mg p.o. b.i.d.

7. Fluticasone/salmeterol/Advair 2 inhalations b.i.d.

8. Gabapentin 800 mg p.o. q.i.d.

9. Hydrocodone/Tylenol 7.5/325 p.o. q.4 hours p.r.n.

10. Hyoscyamine 0.25 mg p.o. q.i.d.

11. Probiotic 1 capsule q.a.m.

12. Lactobacillus 1 capsule p.o. 12 noon.

13. Meclizine 25 mg p.o. b.i.d. p.r.n.

14. Canasa 1000 mg p.r.n. at night.

15. Bactroban 1 application topical p.r.n.

16. Multivitamin 1 tab p.o. q.p.m.

17. Simethicone __________ mg p.o. q.i.d.

18. Sodium chloride 5% eye drops, 1 drop right eye q.i.d.

19. Travatan 1 drop right eye at bedtime.



REVIEW OF SYSTEMS:  Reports dyspnea.  Reports low-grade fever, dry cough, increased

lymphedema.  All other systems are reviewed and are negative unless mentioned in the

HPI. 



PHYSICAL EXAMINATION:

VITAL SIGNS:  Blood pressure 117/94, pulse is 115, respirations 24, temperature is

98.3, pO2 sats 97% on 2 L. 

GENERAL:  The patient appears chronically ill, mild pulmonary distress. 

HEENT:  Head is atraumatic and normocephalic.  Mucous membranes are moist.  Mouth

exam is normal. 

NECK:  Supple.  Normal range of motion. 

CARDIOVASCULAR:  Regular rate and rhythm. 

LUNGS:  Scattered wheezing.  Rhonchi on expiration.  Exam limited by body habitus. 

ABDOMEN:  Soft and nontender.  Exam limited by body habitus. 

BACK:  Diffusely tender to palpation of the spine.  No step off.  No deformities are

noted. 

EXTREMITIES:  +1 edema to bilateral lower extremities.  There is also evidence of

muscle wasting in the lower extremities. 

NEUROLOGIC:  1/4 motor strength in lower extremities, 3/4 bilaterally in upper

extremities.  No focal sensory motor new deficits. 



ASSESSMENT AND PLAN:  

1. Urine with evidence of urinary tract infection with yeast.  We will continue the

Zosyn, Diflucan, nystatin to Candida rash.  We will send urine off for culture.

Blood cultures have been taken and are pending. 

2. The patient becomes an inpatient, Pulmonology consultation recommended.  The

patient was supposed to have some pulmonary tests as an outpatient basis.  Would

appreciate their input when appropriate.  Shortness of breath with some hypoxia,

possible asthma exacerbation.  We will reorder the Xopenex that she was on on last

admission.  The patient reports that she just finished this course of steroids.  We

will continue the Advair. 

3. Gastrointestinal and deep venous thrombosis prophylaxis.

4. Continue home medications.

5. PT, OT evaluation and treatment.

6. Plan discussed with Dr. Garrett who discharged this patient 4 days ago and 

is familiar with the case, he agrees with plan.

7. Hospital course is dependent on clinical findings.







Job ID:  [id number]

## 2019-08-24 RX ADMIN — SODIUM CHLORIDE SCH DRP: 50 SOLUTION/ DROPS OPHTHALMIC at 09:10

## 2019-08-24 RX ADMIN — CLOTRIMAZOLE SCH APPLIC: 1 CREAM TOPICAL at 20:52

## 2019-08-24 RX ADMIN — SODIUM CHLORIDE SCH DRP: 50 SOLUTION/ DROPS OPHTHALMIC at 16:22

## 2019-08-24 RX ADMIN — SIMETHICONE SCH MG: 80 TABLET, CHEWABLE ORAL at 21:42

## 2019-08-24 RX ADMIN — SODIUM CHLORIDE SCH DRP: 50 SOLUTION/ DROPS OPHTHALMIC at 20:54

## 2019-08-24 RX ADMIN — NYSTATIN SCH APPLIC: 100000 POWDER TOPICAL at 20:51

## 2019-08-24 RX ADMIN — NYSTATIN SCH APPLIC: 100000 POWDER TOPICAL at 09:09

## 2019-08-24 RX ADMIN — BRIMONIDINE TARTRATE SCH DRP: 2 SOLUTION OPHTHALMIC at 20:52

## 2019-08-24 RX ADMIN — SODIUM CHLORIDE SCH DRP: 50 SOLUTION/ DROPS OPHTHALMIC at 12:54

## 2019-08-24 RX ADMIN — BRIMONIDINE TARTRATE SCH DRP: 2 SOLUTION OPHTHALMIC at 13:09

## 2019-08-24 RX ADMIN — MOMETASONE FUROATE AND FORMOTEROL FUMARATE DIHYDRATE SCH PUFF: 100; 5 AEROSOL RESPIRATORY (INHALATION) at 06:55

## 2019-08-24 RX ADMIN — Medication PRN LOZ: at 23:19

## 2019-08-24 RX ADMIN — MULTIPLE VITAMINS W/ MINERALS TAB SCH TAB: TAB at 16:22

## 2019-08-24 RX ADMIN — SIMETHICONE SCH MG: 80 TABLET, CHEWABLE ORAL at 16:21

## 2019-08-24 RX ADMIN — MOMETASONE FUROATE AND FORMOTEROL FUMARATE DIHYDRATE SCH PUFF: 200; 5 AEROSOL RESPIRATORY (INHALATION) at 18:34

## 2019-08-24 RX ADMIN — LACTOBACILLUS ACIDOPH-L.BULGARICUS 1 MILLION CELL CHEWABLE TABLET SCH TAB: at 12:53

## 2019-08-24 RX ADMIN — MESALAMINE SCH MG: 1000 SUPPOSITORY RECTAL at 21:05

## 2019-08-24 RX ADMIN — SIMETHICONE SCH MG: 80 TABLET, CHEWABLE ORAL at 12:54

## 2019-08-24 RX ADMIN — CLOTRIMAZOLE SCH APPLIC: 1 CREAM TOPICAL at 09:07

## 2019-08-24 RX ADMIN — SIMETHICONE SCH MG: 80 TABLET, CHEWABLE ORAL at 09:06

## 2019-08-24 RX ADMIN — BRIMONIDINE TARTRATE SCH DRP: 2 SOLUTION OPHTHALMIC at 09:08

## 2019-08-24 NOTE — PDOC.HOSPP
- Subjective


Subjective: 





Seen and examined. Breathing is very difficult. Patient with weak cough unable 

to get any phlegm out. Patient feeling very weak. Denies fever or chills. 





- Objective


Vital Signs & Weight: 


 Vital Signs (12 hours)











  Temp Pulse Resp BP Pulse Ox


 


 08/24/19 13:55   105 H  20   94 L


 


 08/24/19 11:56  98 F  107 H  18  113/80  97


 


 08/24/19 07:09  97.7 F  104 H  15  103/69  99


 


 08/24/19 06:51   109 H  18   97








 Weight











Weight                         302 lb 11.2 oz














I&O: 


 











 08/23/19 08/24/19 08/25/19





 06:59 06:59 06:59


 


Intake Total 100 960 


 


Output Total 750 1550 250


 


Balance -650 -590 250











Result Diagrams: 


 08/23/19 05:42





 08/23/19 06:42


Radiology Reviewed by me: Yes (Chest xray)





ROS





- Medication


Medications: 


Active Medications











Generic Name Dose Route Start Last Admin





  Trade Name Freq  PRN Reason Stop Dose Admin


 


Acidophilus  1 tab  08/23/19 12:00  08/24/19 12:53





  Floranex  PO   1 tab





  1200 NAGA   Administration





     





     





     





     


 


Albuterol/Ipratropium  3 ml  08/24/19 19:00  08/24/19 13:55





  Duoneb  NEB   3 ml





  [id number] NAGA   Administration





     





     





     





     


 


Arformoterol Tartrate  15 mcg  08/23/19 18:30  08/24/19 06:54





  Brovana  NEB   15 mcg





  BID-RT NAGA   Administration





     





     





     





     


 


Baclofen  15 mg  08/23/19 05:00  08/24/19 05:29





  Lioresal  PO   15 mg





  0500,2300 NAGA   Administration





     





     





     





     


 


Baclofen  5 mg  08/24/19 11:00  08/24/19 12:53





  Lioresal  PO   5 mg





  1100,1700 NAGA   Administration





     





     





     





     


 


Benzonatate  100 mg  08/22/19 21:42  08/23/19 23:18





  Tessalon  PO   100 mg





  Q4H PRN   Administration





  Cough   





     





     





     


 


Brimonidine Tartrate  1 drop  08/23/19 09:00  08/24/19 13:09





  Alphagan 0.2% Ophth Soln  R EYE   1 drp





  TID NAGA   Administration





     





     





     





     


 


Budesonide  0.5 mg  08/23/19 18:30  08/24/19 06:54





  Pulmicort Neb Solution  INH   0.5 mg





  BID-RT NAGA   Administration





     





     





     





     


 


Cholecalciferol  5,000 units  08/23/19 09:00  08/24/19 09:07





  Vitamin D3  PO   5,000 units





  DAILY NAGA   Administration





     





     





     





     


 


Clotrimazole  1 gm  08/23/19 21:00  08/24/19 09:07





  Lotrimin 1% Cream  TOP   1 applic





  BID NAGA   Administration





     





     





     





     


 


Cyclobenzaprine HCl  10 mg  08/23/19 09:00  08/24/19 14:53





  Flexeril  PO   10 mg





  TID NAGA   Administration





     





     





     





     


 


Docusate Sodium  100 mg  08/23/19 21:00  08/23/19 21:17





  Colace  PO   100 mg





  HS NAGA   Administration





     





     





     





     


 


Enoxaparin Sodium  40 mg  08/23/19 09:00  08/24/19 09:07





  Lovenox  SC   40 mg





  0900 NAGA   Administration





     





     





     





     


 


Famotidine  20 mg  08/23/19 09:00  08/24/19 09:06





  Pepcid  PO   20 mg





  BID NAGA   Administration





     





     





     





     


 


Fluoxetine HCl  20 mg  08/23/19 09:00  08/24/19 09:06





  Prozac  PO   20 mg





  BID NAGA   Administration





     





     





     





     


 


Gabapentin  800 mg  08/23/19 09:00  08/24/19 12:54





  Neurontin  PO   800 mg





  QID NAGA   Administration





     





     





     





     


 


Hyoscyamine Sulfate  0.25 mg  08/23/19 09:00  08/24/19 12:54





  Levsin  PO   0.25 mg





  QID NAGA   Administration





     





     





     





     


 


Piperacillin Sod/Tazobactam  100 mls @ 200 mls/hr  08/23/19 02:00  08/24/19 12:

54





  Sod 3.375 gm/ Sodium Chloride  IVPB   100 mls





  0200,0800,1400,2000 NAGA   Administration





     





     





     





     


 


Iron/Minerals/Multivitamins  1 tab  08/23/19 17:00  08/23/19 17:00





  Theragran M  PO   1 tab





  QPM-WM NAGA   Administration





     





     





     





     


 


Latanoprost  1 drop  08/23/19 21:00  08/23/19 21:20





  Xalatan 0.005% Ophth Soln  R EYE   1 drp





  HS NAGA   Administration





     





     





     





     


 


Mesalamine  1,000 mg  08/23/19 21:00  08/23/19 21:16





  Canasa  FL   1,000 mg





  HS NAGA   Administration





     





     





     





     


 


Methylprednisolone Sodium Succinate  40 mg  08/24/19 18:00  08/24/19 14:53





  Solu-Medrol  IVP   40 mg





  Q6HR NAGA   Administration





     





     





     





     


 


Mupirocin  0 gm  08/22/19 21:40  08/23/19 08:33





  Bactroban 2% Ointment  TOP   1 applic





  PRN PRN   Administration





  Wound Care   





     





     





     


 


Nystatin  0 gm  08/23/19 09:00  08/24/19 09:09





  Mycostatin Powder  TOP   1 applic





  BID NAGA   Administration





     





     





     





     


 


Saccharomyces Boulardii  250 mg  08/23/19 09:00  08/24/19 09:06





  Florastor  PO   250 mg





  QAM NAGA   Administration





     





     





     





     


 


Simethicone  240 mg  08/23/19 09:00  08/24/19 12:54





  Mylicon Chewable  PO   240 mg





  QID NAGA   Administration





     





     





     





     


 


Sodium Chloride  1 drop  08/23/19 09:00  08/24/19 12:54





  Param-128 5% Oph Sol  R EYE   1 drp





  QID NAGA   Administration





     





     





     





     














- Exam


NAD, awake alert


Eye: PERRL, anicteric sclera


ENT: moist mucosa.  negative: no oropharyngeal lesions


ENT - other findings: Shallow oral apthus ulcers


Neck: supple, symmetric


Heart: RRR, no murmur, no gallops, no rubs


Respiratory: no rales, rhonchi, tachypneic, wheezes


Gastrointestinal: soft, non-tender, non-distended, normal bowel sounds, no 

guarding, no rigidity


Extremities: 1+ LE edema


Skin: no lesions


Neurological: CN's grossly intact, no weakness, no new deficit (Chronic LE 

paralysis from MS)


Musculoskeletal: generalized weakness


Psychiatric: normal affect, A&O x 3





Hosp A/P


(1) Reactive airway disease with acute exacerbation


Code(s): J45.901 - UNSPECIFIED ASTHMA WITH (ACUTE) EXACERBATION   Status: Acute

   





(2) UTI (urinary tract infection)


Status: Acute   





(3) Asthma exacerbation


Code(s): J45.901 - UNSPECIFIED ASTHMA WITH (ACUTE) EXACERBATION   Status: Acute

   


Qualifiers: 


   Asthma severity: moderate 





(4) Complicated UTI (urinary tract infection)


Code(s): N39.0 - URINARY TRACT INFECTION, SITE NOT SPECIFIED   Status: Acute   





(5) Sepsis


Code(s): A41.9 - SEPSIS, UNSPECIFIED ORGANISM   Status: Resolved   





(6) Morbid obesity


Code(s): E66.01 - MORBID (SEVERE) OBESITY DUE TO EXCESS CALORIES   Status: 

Chronic   





(7) Multiple sclerosis, secondary progressive


Code(s): G35 - MULTIPLE SCLEROSIS   Status: Chronic   





(8) Neurogenic bladder


Code(s): N31.9 - NEUROMUSCULAR DYSFUNCTION OF BLADDER, UNSPECIFIED   Status: 

Chronic   





- Plan





Plan:


Pulmonary consultation, recommendations appreciated


Start IV steroids


Continue IV ABX


Xopenex and Ipratropium bromide inhalation scheduled and PRN


Guaifenesin 1200mg x 3 days as expectorant


Cultures noted


MS may be affecting the diaphragm and contributing to weak cough and asthma 

exacerbation


Continue home meds as able


GI and DVT PPX

## 2019-08-24 NOTE — RAD
CHEST 1 VIEW:

 

HISTORY: 

Followup left mid zone infiltrate.

 

COMPARISON: 

8/12/2019 and 8/22/2019.

 

FINDINGS: 

Normal cardiac silhouette.  Persistent opacification of the left lung base with increased obscuration
 of the left hemidiaphragm suggesting worsening pleural effusion.  Interval development of an opacity
 in the right lung base.  No pneumothorax.

 

IMPRESSION: 

Interval worsening bibasilar opacity.

 

POS: [facility]

## 2019-08-24 NOTE — CON
DATE OF CONSULTATION:  



HISTORY OF PRESENT ILLNESS:  A 58-year-old morbidly obese  female with

multiple sclerosis.  She is 138 kg, presented to the ER on 08/22 with shortness of

breath, coughing, unresponsive to usual home medication.  She did have tightness.

She is wheezing.  There is clear low-grade fever. 



She was just recently discharged from the hospital. 



Extensive history well outlined.



PAST MEDICAL HISTORY:  

1. Coronary artery disease.

2. Chronic stasis.

3. Raynaud phenomenon.

4. Colostomy.

5. Indwelling catheter.

6. Multiple sclerosis.

7. Asthma.

8. Possibly COPD.

9. Depression.



PAST SURGERIES:  

1. Tubal ligation.

2. Left wrist surgery.

3. Colostomy.



HABITS:  Tobacco, former smoker.  No alcohol abuse.



HOME MEDICINES:  

1. Baclofen 10 twice a day.

2. Eye drops.

3. Prozac 20.

4. Flexeril 10 three times a day.

5. Gabapentin 800 four times a day.

6. Advair inhaler.

7. Meclizine p.r.n.



ALLERGIES:  BIAXIN.



REVIEW OF SYSTEMS:  Otherwise 10-point negative.



PHYSICAL EXAMINATION:

GENERAL:  The patient in mild distress. 

VITAL SIGNS:  O2 saturation 96% on 2L, respiratory rate 18, pulse 107, temperature

98, blood pressure 113/80. 

CHEST:  Bilateral rhonchi, crackles, minimal wheezing. 

CARDIAC:  Normal S1 and S2.  No gallops. 

ABDOMEN:  No masses.



LABORATORY DATA:  Urine is growing Stenotrophomonas maltophilia, which is generally

sensitive mainly to sulfa and Levaquin.  X-ray shows chronic changes. 



IMPRESSION:  

1. Bronchitis.

2. Asthma.

3. Chronic obstructive pulmonary disease exacerbation.

4. Urinary tract infection.

5. Multiple sclerosis.

6. Chronic pain.

7. Depression.



PLAN:  DuoNeb and steroids initiated.  Magnesium being given. 



We will follow and notify Dr. Arrieta.



TIME SPENT:  70 minutes, 50% direct patient care.







Job ID:  [id number]

## 2019-08-25 RX ADMIN — SODIUM CHLORIDE SCH DRP: 50 SOLUTION/ DROPS OPHTHALMIC at 15:51

## 2019-08-25 RX ADMIN — LACTOBACILLUS ACIDOPH-L.BULGARICUS 1 MILLION CELL CHEWABLE TABLET SCH TAB: at 11:21

## 2019-08-25 RX ADMIN — MOMETASONE FUROATE AND FORMOTEROL FUMARATE DIHYDRATE SCH PUFF: 200; 5 AEROSOL RESPIRATORY (INHALATION) at 19:56

## 2019-08-25 RX ADMIN — SIMETHICONE SCH MG: 80 TABLET, CHEWABLE ORAL at 17:33

## 2019-08-25 RX ADMIN — BRIMONIDINE TARTRATE SCH DRP: 2 SOLUTION OPHTHALMIC at 20:42

## 2019-08-25 RX ADMIN — SODIUM CHLORIDE SCH DRP: 50 SOLUTION/ DROPS OPHTHALMIC at 08:49

## 2019-08-25 RX ADMIN — MOMETASONE FUROATE AND FORMOTEROL FUMARATE DIHYDRATE SCH PUFF: 200; 5 AEROSOL RESPIRATORY (INHALATION) at 06:16

## 2019-08-25 RX ADMIN — SODIUM CHLORIDE SCH DRP: 50 SOLUTION/ DROPS OPHTHALMIC at 20:42

## 2019-08-25 RX ADMIN — CLOTRIMAZOLE SCH APPLIC: 1 CREAM TOPICAL at 20:43

## 2019-08-25 RX ADMIN — NYSTATIN SCH APPLIC: 100000 POWDER TOPICAL at 20:43

## 2019-08-25 RX ADMIN — BRIMONIDINE TARTRATE SCH DRP: 2 SOLUTION OPHTHALMIC at 15:52

## 2019-08-25 RX ADMIN — SODIUM CHLORIDE SCH DRP: 50 SOLUTION/ DROPS OPHTHALMIC at 17:36

## 2019-08-25 RX ADMIN — SIMETHICONE SCH MG: 80 TABLET, CHEWABLE ORAL at 13:06

## 2019-08-25 RX ADMIN — SIMETHICONE SCH MG: 80 TABLET, CHEWABLE ORAL at 20:34

## 2019-08-25 RX ADMIN — MULTIPLE VITAMINS W/ MINERALS TAB SCH TAB: TAB at 17:33

## 2019-08-25 RX ADMIN — NYSTATIN SCH APPLIC: 100000 POWDER TOPICAL at 08:47

## 2019-08-25 RX ADMIN — CLOTRIMAZOLE SCH APPLIC: 1 CREAM TOPICAL at 08:46

## 2019-08-25 RX ADMIN — MESALAMINE SCH MG: 1000 SUPPOSITORY RECTAL at 20:43

## 2019-08-25 RX ADMIN — BRIMONIDINE TARTRATE SCH DRP: 2 SOLUTION OPHTHALMIC at 08:43

## 2019-08-25 RX ADMIN — SIMETHICONE SCH MG: 80 TABLET, CHEWABLE ORAL at 09:51

## 2019-08-25 NOTE — PDOC.HOSPP
- Subjective


Subjective: 





Patient states she is feeling better. Breathing better. Still with weak cough, 

not getting much phlegm out. Able to get an occasional deep breath, though 

mainly short shallow breaths. Denies fever/ chills. 





- Objective


Vital Signs & Weight: 


 Vital Signs (12 hours)











  Temp Pulse Resp BP Pulse Ox


 


 08/25/19 08:00  98.3 F  114 H  18  143/72 H  94 L


 


 08/25/19 06:13   115 H  14   97


 


 08/25/19 04:42   110 H   


 


 08/25/19 00:39      93 L


 


 08/25/19 00:33   120 H  12  








 Weight











Weight                         302 lb 11.2 oz














I&O: 


 











 08/24/19 08/25/19 08/26/19





 06:59 06:59 06:59


 


Intake Total 960 2350 


 


Output Total 1550 2750 


 


Balance -590 -400 











Result Diagrams: 


 08/23/19 05:42





 08/23/19 06:42





ROS





- Medication


Medications: 


Active Medications











Generic Name Dose Route Start Last Admin





  Trade Name Freq  PRN Reason Stop Dose Admin


 


Acidophilus  1 tab  08/23/19 12:00  08/25/19 11:21





  Floranex  PO   1 tab





  1200 NAGA   Administration





     





     





     





     


 


Albuterol/Ipratropium  3 ml  08/24/19 19:00  08/25/19 06:16





  Duoneb  NEB   3 ml





  Q3HQ-JQ NAGA   Administration





     





     





     





     


 


Arformoterol Tartrate  15 mcg  08/23/19 18:30  08/25/19 06:13





  Brovana  NEB   15 mcg





  BID-RT NAGA   Administration





     





     





     





     


 


Baclofen  15 mg  08/23/19 05:00  08/25/19 06:22





  Lioresal  PO   15 mg





  0500,2300 NAGA   Administration





     





     





     





     


 


Baclofen  5 mg  08/24/19 11:00  08/25/19 11:21





  Lioresal  PO   5 mg





  1100,1700 NAGA   Administration





     





     





     





     


 


Benzonatate  100 mg  08/22/19 21:42  08/24/19 23:26





  Tessalon  PO   100 mg





  Q4H PRN   Administration





  Cough   





     





     





     


 


Brimonidine Tartrate  1 drop  08/23/19 09:00  08/25/19 08:43





  Alphagan 0.2% Ophth Soln  R EYE   1 drp





  TID NAGA   Administration





     





     





     





     


 


Budesonide  0.5 mg  08/23/19 18:30  08/25/19 06:16





  Pulmicort Neb Solution  INH   0.5 mg





  BID-RT NAGA   Administration





     





     





     





     


 


Cholecalciferol  5,000 units  08/23/19 09:00  08/25/19 08:39





  Vitamin D3  PO   5,000 units





  DAILY NAGA   Administration





     





     





     





     


 


Clotrimazole  1 gm  08/23/19 21:00  08/25/19 08:46





  Lotrimin 1% Cream  TOP   1 applic





  BID NAGA   Administration





     





     





     





     


 


Cyclobenzaprine HCl  10 mg  08/23/19 09:00  08/25/19 08:41





  Flexeril  PO   10 mg





  TID NAGA   Administration





     





     





     





     


 


Docusate Sodium  100 mg  08/23/19 21:00  08/24/19 20:46





  Colace  PO   100 mg





  HS NAGA   Administration





     





     





     





     


 


Enoxaparin Sodium  40 mg  08/23/19 09:00  08/25/19 08:42





  Lovenox  SC   40 mg





  0900 NAGA   Administration





     





     





     





     


 


Famotidine  20 mg  08/23/19 09:00  08/25/19 08:41





  Pepcid  PO   20 mg





  BID NAGA   Administration





     





     





     





     


 


Fluoxetine HCl  20 mg  08/23/19 09:00  08/25/19 08:42





  Prozac  PO   20 mg





  BID NAGA   Administration





     





     





     





     


 


Gabapentin  800 mg  08/23/19 09:00  08/25/19 08:41





  Neurontin  PO   800 mg





  QID NAGA   Administration





     





     





     





     


 


Guaifenesin  1,200 mg  08/24/19 21:00  08/25/19 08:40





  Mucinex  PO  08/27/19 09:01  1,200 mg





  Q12HR NAGA   Administration





     





     





     





     


 


Hyoscyamine Sulfate  0.25 mg  08/23/19 09:00  08/25/19 09:50





  Levsin  PO   0.25 mg





  QID NAGA   Administration





     





     





     





     


 


Iron/Minerals/Multivitamins  1 tab  08/23/19 17:00  08/24/19 16:22





  Theragran M  PO   1 tab





  QPM-WM NAGA   Administration





     





     





     





     


 


Latanoprost  1 drop  08/23/19 21:00  08/24/19 20:51





  Xalatan 0.005% Ophth Soln  R EYE   1 drp





  HS NAGA   Administration





     





     





     





     


 


Mesalamine  1,000 mg  08/23/19 21:00  08/24/19 21:05





  Canasa  ID   1,000 mg





  HS NAGA   Administration





     





     





     





     


 


Methylprednisolone Sodium Succinate  40 mg  08/24/19 18:00  08/25/19 06:22





  Solu-Medrol  IVP   40 mg





  Q6HR NAGA   Administration





     





     





     





     


 


Mometasone Furoate/Formoterol Fumar  2 puff  08/24/19 18:30  08/25/19 06:16





  Dulera 200 Mcg/5 Mcg Inhaler  INH   2 puff





  BID-RT NAGA   Administration





     





     





     





     


 


Mupirocin  0 gm  08/22/19 21:40  08/25/19 08:45





  Bactroban 2% Ointment  TOP   1 applic





  PRN PRN   Administration





  Wound Care   





     





     





     


 


Nystatin  0 gm  08/23/19 09:00  08/25/19 08:47





  Mycostatin Powder  TOP   1 applic





  BID NAGA   Administration





     





     





     





     


 


Saccharomyces Boulardii  250 mg  08/23/19 09:00  08/25/19 08:42





  Florastor  PO   250 mg





  QAM NAGA   Administration





     





     





     





     


 


Simethicone  240 mg  08/23/19 09:00  08/25/19 09:51





  Mylicon Chewable  PO   240 mg





  QID NAGA   Administration





     





     





     





     


 


Sodium Chloride  1 drop  08/23/19 09:00  08/25/19 08:49





  Param-128 5% Oph Sol  R EYE   1 drp





  QID NAGA   Administration





     





     





     





     


 


Throat Lozenges  1 ernie  08/24/19 12:33  08/24/19 23:19





  Cepastat Lozenges  PO   1 ernie





  Q2H PRN   Administration





  Sore Throat   





     





     





     














- Exam


ill appearing


Eye: PERRL


Eye - other findings: EOMI


ENT: moist mucosa


Neck: supple


Heart: RRR, no murmur, no gallops, no rubs


Respiratory: no rales, rhonchi, wheezes.  negative: normal chest expansion


Respiratory - other findings: Increased bronchial course barking cough, louder 

wheezing


Gastrointestinal: soft, non-tender, non-distended, normal bowel sounds, no 

guarding, no rigidity


Extremities: 1+ LE edema


Neurological: CN's grossly intact, no focal deficits, no new deficit


Musculoskeletal: generalized weakness


Psychiatric: normal affect, A&O x 3





Hosp A/P


(1) Reactive airway disease with acute exacerbation


Code(s): J45.901 - UNSPECIFIED ASTHMA WITH (ACUTE) EXACERBATION   Status: Acute

   





(2) UTI (urinary tract infection)


Status: Acute   





(3) Asthma exacerbation


Code(s): J45.901 - UNSPECIFIED ASTHMA WITH (ACUTE) EXACERBATION   Status: Acute

   


Qualifiers: 


   Asthma severity: moderate 





(4) Complicated UTI (urinary tract infection)


Code(s): N39.0 - URINARY TRACT INFECTION, SITE NOT SPECIFIED   Status: Acute   





(5) Sepsis


Code(s): A41.9 - SEPSIS, UNSPECIFIED ORGANISM   Status: Resolved   





(6) Morbid obesity


Code(s): E66.01 - MORBID (SEVERE) OBESITY DUE TO EXCESS CALORIES   Status: 

Chronic   





(7) Multiple sclerosis, secondary progressive


Code(s): G35 - MULTIPLE SCLEROSIS   Status: Chronic   





(8) Neurogenic bladder


Code(s): N31.9 - NEUROMUSCULAR DYSFUNCTION OF BLADDER, UNSPECIFIED   Status: 

Chronic   





- Plan





Plan:


Pulmonary consultation, recommendations appreciated


Continue IV steroids


Continue IV ABX, Levaquin should cover both pulmonary organisms and UTI with 

pseudomonas 


Xopenex and Ipratropium bromide inhalation scheduled and PRN


Guaifenesin 1200mg x 3 days as expectorant


Cultures noted


MS may be affecting the diaphragm and contributing to weak cough and asthma 

exacerbation


Continue home meds as able


GI and DVT PPX


Improving on maximum medical management

## 2019-08-25 NOTE — PRG
DATE OF SERVICE:  08/25/2019



SUBJECTIVE:  This morning, she says she feels better.  She is coughing, but improved.



OBJECTIVE:  VITAL SIGNS:  Sats are 95% on 3 L, respiratory rate 18, temperature 98,

blood pressure 143/72, and pulse 114. 

CHEST:  Anterior rhonchi. 

CARDIAC:  Normal S1 and S2.  No gallops. 

ABDOMEN:  No masses.



IMPRESSION:  

1. Urinary tract infection.

2. Bronchitis.

3. Multiple sclerosis.



PLAN:  Continue present treatment, steroids, and neb treatment. 



We will follow.







Job ID:  [id number]

## 2019-08-26 RX ADMIN — SIMETHICONE SCH MG: 80 TABLET, CHEWABLE ORAL at 12:36

## 2019-08-26 RX ADMIN — MESALAMINE SCH MG: 1000 SUPPOSITORY RECTAL at 21:00

## 2019-08-26 RX ADMIN — SIMETHICONE SCH MG: 80 TABLET, CHEWABLE ORAL at 08:11

## 2019-08-26 RX ADMIN — BRIMONIDINE TARTRATE SCH DRP: 2 SOLUTION OPHTHALMIC at 08:23

## 2019-08-26 RX ADMIN — SODIUM CHLORIDE SCH DRP: 50 SOLUTION/ DROPS OPHTHALMIC at 20:56

## 2019-08-26 RX ADMIN — CLOTRIMAZOLE SCH APPLIC: 1 CREAM TOPICAL at 21:02

## 2019-08-26 RX ADMIN — BRIMONIDINE TARTRATE SCH DRP: 2 SOLUTION OPHTHALMIC at 15:29

## 2019-08-26 RX ADMIN — MOMETASONE FUROATE AND FORMOTEROL FUMARATE DIHYDRATE SCH PUFF: 200; 5 AEROSOL RESPIRATORY (INHALATION) at 06:26

## 2019-08-26 RX ADMIN — NYSTATIN SCH APPLIC: 100000 POWDER TOPICAL at 08:23

## 2019-08-26 RX ADMIN — SODIUM CHLORIDE SCH DRP: 50 SOLUTION/ DROPS OPHTHALMIC at 08:24

## 2019-08-26 RX ADMIN — SODIUM CHLORIDE SCH DRP: 50 SOLUTION/ DROPS OPHTHALMIC at 17:55

## 2019-08-26 RX ADMIN — SIMETHICONE SCH MG: 80 TABLET, CHEWABLE ORAL at 20:58

## 2019-08-26 RX ADMIN — SIMETHICONE SCH MG: 80 TABLET, CHEWABLE ORAL at 17:55

## 2019-08-26 RX ADMIN — NYSTATIN SCH APPLIC: 100000 POWDER TOPICAL at 21:13

## 2019-08-26 RX ADMIN — BRIMONIDINE TARTRATE SCH DRP: 2 SOLUTION OPHTHALMIC at 21:00

## 2019-08-26 RX ADMIN — MULTIPLE VITAMINS W/ MINERALS TAB SCH TAB: TAB at 17:55

## 2019-08-26 RX ADMIN — LACTOBACILLUS ACIDOPH-L.BULGARICUS 1 MILLION CELL CHEWABLE TABLET SCH TAB: at 12:35

## 2019-08-26 RX ADMIN — SODIUM CHLORIDE SCH DRP: 50 SOLUTION/ DROPS OPHTHALMIC at 12:56

## 2019-08-26 RX ADMIN — MOMETASONE FUROATE AND FORMOTEROL FUMARATE DIHYDRATE SCH PUFF: 200; 5 AEROSOL RESPIRATORY (INHALATION) at 20:23

## 2019-08-26 RX ADMIN — CLOTRIMAZOLE SCH APPLIC: 1 CREAM TOPICAL at 08:23

## 2019-08-26 NOTE — PDOC.HOSPP
- Subjective


Encounter Date: 08/26/19


Encounter Time: 12:18


Subjective: 


Morbidly obese female with debiluitating MS associated with paraparesis, 

neurogenic bladder and bowel s/p diversion colostomy and chronic rothman, 

recently discharge following treatment for COPD exacerbation, now readmitted 

for worsening SOB and cough. Still coughing with minimal sputum production. 

feeling better otherwise.





- Objective


Vital Signs & Weight: 


 Vital Signs (12 hours)











  Pulse Resp Pulse Ox


 


 08/26/19 06:24  114 H  16  96


 


 08/26/19 02:20    94 L








 Weight











Weight                         302 lb 11.2 oz














I&O: 


 











 08/25/19 08/26/19 08/27/19





 06:59 06:59 06:59


 


Intake Total 2350 1440 


 


Output Total 2750 1865 


 


Balance -400 -425 











Result Diagrams: 


 08/23/19 05:42





 08/23/19 06:42





ROS





- Medication


Medications: 


Active Medications











Generic Name Dose Route Start Last Admin





  Trade Name Freq  PRN Reason Stop Dose Admin


 


Acidophilus  1 tab  08/23/19 12:00  08/25/19 11:21





  Floranex  PO   1 tab





  1200 NAGA   Administration





     





     





     





     


 


Arformoterol Tartrate  15 mcg  08/23/19 18:30  08/26/19 06:35





  Brovana  NEB   15 mcg





  BID-RT NAGA   Administration





     





     





     





     


 


Baclofen  15 mg  08/23/19 05:00  08/26/19 05:50





  Lioresal  PO   15 mg





  0500,2300 NAGA   Administration





     





     





     





     


 


Baclofen  5 mg  08/24/19 11:00  08/25/19 17:35





  Lioresal  PO   5 mg





  1100,1700 NAGA   Administration





     





     





     





     


 


Benzonatate  100 mg  08/22/19 21:42  08/26/19 05:52





  Tessalon  PO   100 mg





  Q4H PRN   Administration





  Cough   





     





     





     


 


Brimonidine Tartrate  1 drop  08/23/19 09:00  08/26/19 08:23





  Alphagan 0.2% Ophth Soln  R EYE   1 drp





  TID NAGA   Administration





     





     





     





     


 


Budesonide  0.5 mg  08/23/19 18:30  08/26/19 06:24





  Pulmicort Neb Solution  INH   0.5 mg





  BID-RT NAGA   Administration





     





     





     





     


 


Cholecalciferol  5,000 units  08/23/19 09:00  08/26/19 08:12





  Vitamin D3  PO   5,000 units





  DAILY NAGA   Administration





     





     





     





     


 


Clotrimazole  1 gm  08/23/19 21:00  08/26/19 08:23





  Lotrimin 1% Cream  TOP   1 applic





  BID NAGA   Administration





     





     





     





     


 


Cyclobenzaprine HCl  10 mg  08/23/19 09:00  08/26/19 08:13





  Flexeril  PO   10 mg





  TID NAGA   Administration





     





     





     





     


 


Docusate Sodium  100 mg  08/23/19 21:00  08/25/19 20:36





  Colace  PO   100 mg





  HS NAGA   Administration





     





     





     





     


 


Enoxaparin Sodium  40 mg  08/23/19 09:00  08/26/19 08:11





  Lovenox  SC   40 mg





  0900 NAGA   Administration





     





     





     





     


 


Famotidine  20 mg  08/23/19 09:00  08/26/19 08:13





  Pepcid  PO   20 mg





  BID NAGA   Administration





     





     





     





     


 


Fluoxetine HCl  20 mg  08/23/19 09:00  08/26/19 08:13





  Prozac  PO   20 mg





  BID NAGA   Administration





     





     





     





     


 


Gabapentin  800 mg  08/23/19 09:00  08/26/19 08:13





  Neurontin  PO   800 mg





  QID NAGA   Administration





     





     





     





     


 


Guaifenesin  1,200 mg  08/24/19 21:00  08/26/19 08:12





  Mucinex  PO  08/27/19 09:01  1,200 mg





  Q12HR NAGA   Administration





     





     





     





     


 


Hyoscyamine Sulfate  0.25 mg  08/23/19 09:00  08/26/19 08:11





  Levsin  PO   0.25 mg





  QID NAGA   Administration





     





     





     





     


 


Iron/Minerals/Multivitamins  1 tab  08/23/19 17:00  08/25/19 17:33





  Theragran M  PO   1 tab





  QPM-WM NAGA   Administration





     





     





     





     


 


Latanoprost  1 drop  08/23/19 21:00  08/25/19 20:43





  Xalatan 0.005% Ophth Soln  R EYE   1 drp





  HS NAGA   Administration





     





     





     





     


 


Levofloxacin  750 mg  08/26/19 06:00  08/26/19 05:50





  Levaquin  PO  09/02/19 06:01  750 mg





  0600 NAGA   Administration





     





     





     





     


 


Mesalamine  1,000 mg  08/23/19 21:00  08/25/19 20:43





  Canasa  NH   1,000 mg





  HS NAGA   Administration





     





     





     





     


 


Methylprednisolone Sodium Succinate  40 mg  08/24/19 18:00  08/26/19 05:50





  Solu-Medrol  IVP   40 mg





  Q6HR NAGA   Administration





     





     





     





     


 


Mometasone Furoate/Formoterol Fumar  2 puff  08/24/19 18:30  08/26/19 06:26





  Dulera 200 Mcg/5 Mcg Inhaler  INH   2 puff





  BID-RT NAGA   Administration





     





     





     





     


 


Mupirocin  0 gm  08/22/19 21:40  08/25/19 08:45





  Bactroban 2% Ointment  TOP   1 applic





  PRN PRN   Administration





  Wound Care   





     





     





     


 


Nystatin  0 gm  08/23/19 09:00  08/26/19 08:23





  Mycostatin Powder  TOP   1 applic





  BID NAGA   Administration





     





     





     





     


 


Saccharomyces Boulardii  250 mg  08/23/19 09:00  08/26/19 08:12





  Florastor  PO   250 mg





  QAM NAGA   Administration





     





     





     





     


 


Simethicone  240 mg  08/23/19 09:00  08/26/19 08:11





  Mylicon Chewable  PO   240 mg





  QID NAGA   Administration





     





     





     





     


 


Sodium Chloride  1 drop  08/23/19 09:00  08/26/19 08:24





  Param-128 5% Oph Sol  R EYE   1 drp





  QID NAGA   Administration





     





     





     





     


 


Throat Lozenges  1 ernie  08/24/19 12:33  08/24/19 23:19





  Cepastat Lozenges  PO   1 ernie





  Q2H PRN   Administration





  Sore Throat   





     





     





     














- Exam


awake alert


General - other findings: morbidly obese


Eye: anicteric sclera


ENT: normocephalic atraumatic


Heart: RRR


Heart - other findings: tachycardic


Respiratory: no ronchi


Respiratory - other findings: fair air entry with crackles on the right lung 

base posteriorly. 


Gastrointestinal: soft, non-distended, normal bowel sounds


Gastrointestinal - other findings: morbidly obese with pannus. colostomy noted


Extremities: no cyanosis, no edema


Extremeties - other findings: loose skin with some varicosities on both legs 

but no edema


Neurological: CN's grossly intact


Neurological - other findings: paraparesis as well as mild left upper limb 

weakness noted


Psychiatric: normal affect, A&O x 3





Hosp A/P


(1) COPD exacerbation


Code(s): J44.1 - CHRONIC OBSTRUCTIVE PULMONARY DISEASE W (ACUTE) EXACERBATION   

Status: Acute   





(2) Acute bronchitis


Code(s): J20.9 - ACUTE BRONCHITIS, UNSPECIFIED   Status: Acute   





(3) Complicated UTI (urinary tract infection)


Code(s): N39.0 - URINARY TRACT INFECTION, SITE NOT SPECIFIED   Status: Acute   





(4) Colostomy in place


Code(s): Z93.3 - COLOSTOMY STATUS   Status: Chronic   





(5) Functional quadriplegia secondary to MS


Code(s): G35 - MULTIPLE SCLEROSIS; R53.2 - FUNCTIONAL QUADRIPLEGIA   Status: 

Chronic   





(6) Morbid obesity


Code(s): E66.01 - MORBID (SEVERE) OBESITY DUE TO EXCESS CALORIES   Status: 

Chronic   





(7) Multiple sclerosis, secondary progressive


Code(s): G35 - MULTIPLE SCLEROSIS   Status: Chronic   





(8) Neurogenic bladder


Code(s): N31.9 - NEUROMUSCULAR DYSFUNCTION OF BLADDER, UNSPECIFIED   Status: 

Chronic   





(9) Ulcerative colitis


Code(s): K51.90 - ULCERATIVE COLITIS, UNSPECIFIED, WITHOUT COMPLICATIONS   

Status: Chronic   





- Plan


Continue bronchodilators, steroid, oxygen and antibiotics.


Continue other supportive care.


mucomyst treatment contemplated.


Continue PT

## 2019-08-26 NOTE — PRG
DATE OF SERVICE:  08/26/2019



SUBJECTIVE:  She is still very frustrated about cough, wheezing, and shortness of

breath. 



OBJECTIVE:  VITAL SIGNS:  On exam, her temperature is 98.2, pulse 114, O2 saturation

96% on 3 L, and respiratory rate 16. 

HEENT:  Unremarkable. 

NECK:  No adenopathy or JVD. 

LUNGS:  I do not hear any audible wheezing at the current time, but she does have a

very profound cough. 

ABDOMEN:  Soft, obese, nontender, and nondistended. 

EXTREMITIES:  No edema.



LABORATORY DATA:  No new labs were done today.



ASSESSMENT:  

1. Asthmatic bronchitis with continued symptoms.

2. Multiple sclerosis.



PLAN:  

1. I have added Pulmicort to her nebulization regimen.

2. Try EzPAP.

3. Apparently, she gave her home nebulizer away to hospice organization.  She does

not currently have a nebulization system at home.  She is probably not strong enough

to actuate metered-dose inhaler, so some type of nebulization machine will have to

be found for her. 







Job ID:  [id number]

## 2019-08-27 LAB
ANION GAP SERPL CALC-SCNC: 13 MMOL/L (ref 10–20)
BASOPHILS # BLD AUTO: 0 THOU/UL (ref 0–0.2)
BASOPHILS NFR BLD AUTO: 0 % (ref 0–1)
BUN SERPL-MCNC: 13 MG/DL (ref 9.8–20.1)
CALCIUM SERPL-MCNC: 9.1 MG/DL (ref 7.8–10.44)
CHLORIDE SERPL-SCNC: 106 MMOL/L (ref 98–107)
CO2 SERPL-SCNC: 24 MMOL/L (ref 22–29)
CREAT CL PREDICTED SERPL C-G-VRATE: 218 ML/MIN (ref 70–130)
EOSINOPHIL # BLD AUTO: 0 THOU/UL (ref 0–0.7)
EOSINOPHIL NFR BLD AUTO: 0.2 % (ref 0–10)
GLUCOSE SERPL-MCNC: 138 MG/DL (ref 70–105)
HGB BLD-MCNC: 14.8 G/DL (ref 12–16)
LYMPHOCYTES # BLD: 0.9 THOU/UL (ref 1.2–3.4)
LYMPHOCYTES NFR BLD AUTO: 5.9 % (ref 21–51)
MCH RBC QN AUTO: 28.2 PG (ref 27–31)
MCV RBC AUTO: 89.9 FL (ref 78–98)
MONOCYTES # BLD AUTO: 0.9 THOU/UL (ref 0.11–0.59)
MONOCYTES NFR BLD AUTO: 5.9 % (ref 0–10)
NEUTROPHILS # BLD AUTO: 13 THOU/UL (ref 1.4–6.5)
NEUTROPHILS NFR BLD AUTO: 88 % (ref 42–75)
PLATELET # BLD AUTO: 495 THOU/UL (ref 130–400)
POTASSIUM SERPL-SCNC: 4.2 MMOL/L (ref 3.5–5.1)
RBC # BLD AUTO: 5.25 MILL/UL (ref 4.2–5.4)
SODIUM SERPL-SCNC: 139 MMOL/L (ref 136–145)
WBC # BLD AUTO: 14.8 THOU/UL (ref 4.8–10.8)

## 2019-08-27 RX ADMIN — NYSTATIN SCH APPLIC: 100000 POWDER TOPICAL at 20:59

## 2019-08-27 RX ADMIN — SIMETHICONE SCH MG: 80 TABLET, CHEWABLE ORAL at 12:57

## 2019-08-27 RX ADMIN — MOMETASONE FUROATE AND FORMOTEROL FUMARATE DIHYDRATE SCH PUFF: 200; 5 AEROSOL RESPIRATORY (INHALATION) at 10:08

## 2019-08-27 RX ADMIN — MULTIPLE VITAMINS W/ MINERALS TAB SCH TAB: TAB at 17:12

## 2019-08-27 RX ADMIN — SODIUM CHLORIDE SCH DRP: 50 SOLUTION/ DROPS OPHTHALMIC at 21:00

## 2019-08-27 RX ADMIN — SODIUM CHLORIDE SCH DRP: 50 SOLUTION/ DROPS OPHTHALMIC at 12:57

## 2019-08-27 RX ADMIN — BRIMONIDINE TARTRATE SCH DRP: 2 SOLUTION OPHTHALMIC at 09:30

## 2019-08-27 RX ADMIN — MESALAMINE SCH MG: 1000 SUPPOSITORY RECTAL at 20:57

## 2019-08-27 RX ADMIN — CLOTRIMAZOLE SCH APPLIC: 1 CREAM TOPICAL at 09:29

## 2019-08-27 RX ADMIN — CLOTRIMAZOLE SCH APPLIC: 1 CREAM TOPICAL at 21:01

## 2019-08-27 RX ADMIN — SIMETHICONE SCH MG: 80 TABLET, CHEWABLE ORAL at 09:28

## 2019-08-27 RX ADMIN — BRIMONIDINE TARTRATE SCH DRP: 2 SOLUTION OPHTHALMIC at 21:02

## 2019-08-27 RX ADMIN — NYSTATIN SCH APPLIC: 100000 POWDER TOPICAL at 09:30

## 2019-08-27 RX ADMIN — SODIUM CHLORIDE SCH DRP: 50 SOLUTION/ DROPS OPHTHALMIC at 09:30

## 2019-08-27 RX ADMIN — SIMETHICONE SCH MG: 80 TABLET, CHEWABLE ORAL at 17:11

## 2019-08-27 RX ADMIN — SIMETHICONE SCH MG: 80 TABLET, CHEWABLE ORAL at 21:40

## 2019-08-27 RX ADMIN — BRIMONIDINE TARTRATE SCH DRP: 2 SOLUTION OPHTHALMIC at 15:35

## 2019-08-27 RX ADMIN — LACTOBACILLUS ACIDOPH-L.BULGARICUS 1 MILLION CELL CHEWABLE TABLET SCH TAB: at 12:57

## 2019-08-27 RX ADMIN — MOMETASONE FUROATE AND FORMOTEROL FUMARATE DIHYDRATE SCH PUFF: 200; 5 AEROSOL RESPIRATORY (INHALATION) at 18:48

## 2019-08-27 NOTE — PDOC.HOSPP
- Subjective


Encounter Date: 08/27/19


Encounter Time: 16:49


Subjective: 


Morbidly obese female with debiluitating MS associated with paraparesis and 

neurogenic bladder s/p chronic indwelling rothman, UC s/p diversion colostomy, 

recently discharge following treatment for COPD exacerbation, now readmitted 

with worsening SOB and cough. Still coughing with minimal sputum production but 

feeling better overall.





- Objective


Vital Signs & Weight: 


 Vital Signs (12 hours)











  Temp Pulse Resp BP Pulse Ox


 


 08/27/19 18:48   109 H  16   95


 


 08/27/19 18:47   109 H  16   95


 


 08/27/19 14:04   116 H  20   96


 


 08/27/19 10:03   118 H  20   95


 


 08/27/19 08:32  98.4 F  121 H  18  142/78 H  98








 Weight











Weight                         302 lb 11.2 oz














I&O: 


 











 08/26/19 08/27/19 08/28/19





 06:59 06:59 06:59


 


Intake Total 1440 620 


 


Output Total 1865 1550 


 


Balance -425 -930 











Result Diagrams: 


 08/27/19 05:21





 08/27/19 05:21





Hospitalist ROS





- Medication


Medications: 


Active Medications











Generic Name Dose Route Start Last Admin





  Trade Name Freq  PRN Reason Stop Dose Admin


 


Acidophilus  1 tab  08/23/19 12:00  08/27/19 12:57





  Floranex  PO   1 tab





  1200 NAGA   Administration





     





     





     





     


 


Albuterol/Ipratropium  3 ml  08/26/19 10:30  08/27/19 18:48





  Duoneb  EZPAP   3 ml





  [id number] NAGA   Administration





     





     





     





     


 


Arformoterol Tartrate  15 mcg  08/23/19 18:30  08/27/19 18:48





  Brovana  NEB   15 mcg





  BID-RT NAGA   Administration





     





     





     





     


 


Baclofen  15 mg  08/23/19 05:00  08/27/19 06:13





  Lioresal  PO   15 mg





  0500,2300 NAGA   Administration





     





     





     





     


 


Baclofen  5 mg  08/24/19 11:00  08/27/19 17:11





  Lioresal  PO   5 mg





  1100,1700 NAGA   Administration





     





     





     





     


 


Benzonatate  100 mg  08/22/19 21:42  08/26/19 05:52





  Tessalon  PO   100 mg





  Q4H PRN   Administration





  Cough   





     





     





     


 


Brimonidine Tartrate  1 drop  08/23/19 09:00  08/27/19 15:35





  Alphagan 0.2% Ophth Soln  R EYE   1 drp





  TID NAGA   Administration





     





     





     





     


 


Budesonide  0.5 mg  08/26/19 18:30  08/27/19 18:47





  Pulmicort Neb Solution  INH   0.5 mg





  BID-RT NAGA   Administration





     





     





     





     


 


Cholecalciferol  5,000 units  08/23/19 09:00  08/27/19 09:29





  Vitamin D3  PO   5,000 units





  DAILY NAGA   Administration





     





     





     





     


 


Clotrimazole  1 gm  08/23/19 21:00  08/27/19 09:29





  Lotrimin 1% Cream  TOP   1 applic





  BID NAGA   Administration





     





     





     





     


 


Cyclobenzaprine HCl  10 mg  08/23/19 09:00  08/27/19 15:35





  Flexeril  PO   10 mg





  TID NAGA   Administration





     





     





     





     


 


Docusate Sodium  100 mg  08/23/19 21:00  08/26/19 20:57





  Colace  PO   100 mg





  HS NAGA   Administration





     





     





     





     


 


Enoxaparin Sodium  40 mg  08/23/19 09:00  08/27/19 09:29





  Lovenox  SC   40 mg





  0900 NAGA   Administration





     





     





     





     


 


Famotidine  20 mg  08/23/19 09:00  08/27/19 09:28





  Pepcid  PO   20 mg





  BID NAGA   Administration





     





     





     





     


 


Fluoxetine HCl  20 mg  08/23/19 09:00  08/27/19 09:28





  Prozac  PO   20 mg





  BID NAGA   Administration





     





     





     





     


 


Gabapentin  800 mg  08/23/19 09:00  08/27/19 17:11





  Neurontin  PO   800 mg





  QID NAGA   Administration





     





     





     





     


 


Hyoscyamine Sulfate  0.25 mg  08/23/19 09:00  08/27/19 17:12





  Levsin  PO   0.25 mg





  QID NAGA   Administration





     





     





     





     


 


Iron/Minerals/Multivitamins  1 tab  08/23/19 17:00  08/27/19 17:12





  Theragran M  PO   1 tab





  QPM-WM NAGA   Administration





     





     





     





     


 


Latanoprost  1 drop  08/23/19 21:00  08/26/19 21:08





  Xalatan 0.005% Ophth Soln  R EYE   1 drp





  HS NAGA   Administration





     





     





     





     


 


Levofloxacin  750 mg  08/26/19 06:00  08/27/19 06:13





  Levaquin  PO  09/02/19 06:01  750 mg





  0600 NAGA   Administration





     





     





     





     


 


Mesalamine  1,000 mg  08/23/19 21:00  08/26/19 21:00





  Canasa  [place]   1,000 mg





  HS NAGA   Administration





     





     





     





     


 


Mometasone Furoate/Formoterol Fumar  2 puff  08/24/19 18:30  08/27/19 18:48





  Dulera 200 Mcg/5 Mcg Inhaler  INH   2 puff





  BID-RT NAGA   Administration





     





     





     





     


 


Montelukast Sodium  10 mg  08/26/19 21:00  08/26/19 20:57





  Singulair  PO   10 mg





  QPM NAGA   Administration





     





     





     





     


 


Mupirocin  0 gm  08/22/19 21:40  08/25/19 08:45





  Bactroban 2% Ointment  TOP   1 applic





  PRN PRN   Administration





  Wound Care   





     





     





     


 


Nystatin  0 gm  08/23/19 09:00  08/27/19 09:30





  Mycostatin Powder  TOP   1 applic





  BID NAGA   Administration





     





     





     





     


 


Saccharomyces Boulardii  250 mg  08/23/19 09:00  08/27/19 09:30





  Florastor  PO   250 mg





  QAM NAGA   Administration





     





     





     





     


 


Simethicone  240 mg  08/23/19 09:00  08/27/19 17:11





  Mylicon Chewable  PO   240 mg





  QID NAGA   Administration





     





     





     





     


 


Sodium Chloride  1 drop  08/23/19 09:00  08/27/19 12:57





  Param-128 5% Oph Sol  R EYE   1 drp





  QID NAGA   Administration





     





     





     





     


 


Throat Lozenges  1 ernie  08/24/19 12:33  08/24/19 23:19





  Cepastat Lozenges  PO   1 ernie





  Q2H PRN   Administration





  Sore Throat   





     





     





     














- Exam


General Appearance: awake alert


General - other findings: morbidly obese


Eye: anicteric sclera


ENT: normocephalic atraumatic, moist mucosa


Neck: symmetric


Heart: RRR


Heart - other findings: tachycardic


Respiratory: no wheezes, no ronchi, no tachypnea


Respiratory - other findings: few right basal crackles posteriorly noted


Gastrointestinal: soft, non-tender, non-distended, normal bowel sounds


Extremities: no cyanosis, no edema


Neurological: CN's grossly intact, no new deficit


Psychiatric: normal affect, A&O x 3





Hosp A/P


(1) Acute respiratory failure with hypoxia


Code(s): J96.01 - ACUTE RESPIRATORY FAILURE WITH HYPOXIA   Status: Acute   





(2) COPD exacerbation


Code(s): J44.1 - CHRONIC OBSTRUCTIVE PULMONARY DISEASE W (ACUTE) EXACERBATION   

Status: Acute   





(3) Acute bronchitis


Code(s): J20.9 - ACUTE BRONCHITIS, UNSPECIFIED   Status: Acute   





(4) Complicated UTI (urinary tract infection)


Code(s): N39.0 - URINARY TRACT INFECTION, SITE NOT SPECIFIED   Status: Acute   





(5) Colostomy in place


Code(s): Z93.3 - COLOSTOMY STATUS   Status: Chronic   





(6) Functional quadriplegia secondary to MS


Code(s): G35 - MULTIPLE SCLEROSIS; R53.2 - FUNCTIONAL QUADRIPLEGIA   Status: 

Chronic   





(7) Morbid obesity


Code(s): E66.01 - MORBID (SEVERE) OBESITY DUE TO EXCESS CALORIES   Status: 

Chronic   





(8) Multiple sclerosis, secondary progressive


Code(s): G35 - MULTIPLE SCLEROSIS   Status: Chronic   





(9) Neurogenic bladder


Code(s): N31.9 - NEUROMUSCULAR DYSFUNCTION OF BLADDER, UNSPECIFIED   Status: 

Chronic   





(10) Ulcerative colitis


Code(s): K51.90 - ULCERATIVE COLITIS, UNSPECIFIED, WITHOUT COMPLICATIONS   

Status: Chronic   





- Plan


Start low dose metoprolol for persistent tachycardia.


Continue bronchodilators, steroid, oxygen and antibiotics.


Continue EZpap and acapella as per Pulmonary


Wean oxygen as tolerated.


Continue other supportive care.


Continue PT

## 2019-08-27 NOTE — PRG
DATE OF SERVICE:  08/27/2019



SUBJECTIVE:  She says she feels better in terms of her cough after starting the

EzPAP yesterday. 



OBJECTIVE:  VITAL SIGNS:  Temperature is 98.4, pulse 121, respirations 18, O2

saturation 98% on 3 L, and blood pressure 142/78. 

HEENT:  Unremarkable. 

NECK:  No adenopathy or JVD. 

CHEST:  Clear anteriorly. 

CARDIAC:  S1 and S2.  Regular without murmur. 

ABDOMEN:  Soft. 

EXTREMITIES:  No edema.



LABORATORY DATA:  White blood cell count 14.8, hematocrit 47.2, and platelet count

495.  Sodium 139, potassium 4.2, chloride 106, CO2 of 24, __________, glucose 138. 



ASSESSMENT:  

1. Asthmatic bronchitis.

2. Multiple sclerosis.



PLAN:  I do not really think the patient's symptoms are as bad as she is letting on,

and I suspect there may be a malingering component to her current hospitalization.

The EzPAP has helped in her opinion; however, that is not a viable solution at home.

 I will try her on an Acapella flutter valve and see if that works.  I have advised

that she needs a sleep study as an outpatient to see if she would qualify for

outpatient BiPAP.  She was quite reluctant to undergo that.  She does have care

issues that would make a sleep study in the Sleep Lab very difficult.  I am, in the

meantime, continuing other medications and will transition to oral steroids. 







Job ID:  [id number]

## 2019-08-27 NOTE — PQF
CLINICAL DOCUMENTATION IMPROVEMENT CLARIFICATION FORM:  ICD-10 Updated

PLEASE DO AN ADDENDUM TO THE PROGRESS NOTE WITH ANY DOCUMENTATION UPDATES OR 
ADDITIONS AND CARRY THROUGH TO DC SUMMARY.   THANK YOU.



DATE:   8/27/2019; 8/28/2019                                  ATTN:  Dr. Martínez



Please exercise your independent, professional judgment in responding to the 
clarification form. 

Clinical indicators are provided on the bottom of this form for your review



Please check appropriate box(es):

[  ] Sepsis due to UTI due to indwelling rothman catheter.

[  ] Sepsis due to UTI not due to indwelling rothman catheter. 

[  ] Localized infection without sepsis

[ X ] Other diagnosis Presumed sepsis from COPD exacerbation and or possible 
catheter associated UTI 

[  ] Unable to determine



In addition, please specify:

Present on Admission (POA):  [ X ] Yes             [  ] No             [  ] 
Unable to determine



For continuity of documentation, please document condition throughout progress 
notes and discharge summary.  Thank You.



CLINICAL INDICATORS - SIGNS / SYMPTOMS / LABS



ER NURSES NOTES  8/22:  /120, Pulse 130, Resp. 25. Temp 99.5, O2 SAT 96 
on Face mask. 

    ER Nursing Assessment:  Pt has an indwelling catheter and a colostomy bag. 

                Urine collection:   Pt already has a rothman established.



PN 8/23: Chronic indwelling rothman. 

              UTI



PN 8/24-8/25:  Sepsis. Resolved.



RISKS:

H&P 8/22: Debilitating multiple sclerosis resulting in bed-bound state, 
hemiplegia from the waist down. 

Urine with evidence of urinary tract infection with yeast. PN 8/23: Chronic 
indwelling rothman. 



TREATMENT:

MAR: Order 8/23-8/25: IV Zosyn

MAR: Order 8/25: Levaquin 750mg po 

_____________________________________





Thank you,

Annette

(This form is maintained as a part of the permanent medical record)

 2015 [employer]  All Rights Reserved

Annette Ruiz RN, BSN    [email or web address]    Office: [phone]

                                                              

[facility]

## 2019-08-28 LAB
ANION GAP SERPL CALC-SCNC: 14 MMOL/L (ref 10–20)
BUN SERPL-MCNC: 14 MG/DL (ref 9.8–20.1)
CALCIUM SERPL-MCNC: 8.8 MG/DL (ref 7.8–10.44)
CHLORIDE SERPL-SCNC: 107 MMOL/L (ref 98–107)
CO2 SERPL-SCNC: 22 MMOL/L (ref 22–29)
CREAT CL PREDICTED SERPL C-G-VRATE: 237 ML/MIN (ref 70–130)
GLUCOSE SERPL-MCNC: 107 MG/DL (ref 70–105)
MAGNESIUM SERPL-MCNC: 2.3 MG/DL (ref 1.6–2.6)
POTASSIUM SERPL-SCNC: 4.5 MMOL/L (ref 3.5–5.1)
SODIUM SERPL-SCNC: 138 MMOL/L (ref 136–145)

## 2019-08-28 RX ADMIN — SIMETHICONE SCH MG: 80 TABLET, CHEWABLE ORAL at 08:49

## 2019-08-28 RX ADMIN — CLOTRIMAZOLE SCH APPLIC: 1 CREAM TOPICAL at 08:52

## 2019-08-28 RX ADMIN — NYSTATIN SCH APPLIC: 100000 POWDER TOPICAL at 20:34

## 2019-08-28 RX ADMIN — CLOTRIMAZOLE SCH APPLIC: 1 CREAM TOPICAL at 20:34

## 2019-08-28 RX ADMIN — SODIUM CHLORIDE SCH: 50 SOLUTION/ DROPS OPHTHALMIC at 13:00

## 2019-08-28 RX ADMIN — BRIMONIDINE TARTRATE SCH DRP: 2 SOLUTION OPHTHALMIC at 20:32

## 2019-08-28 RX ADMIN — SODIUM CHLORIDE SCH DRP: 50 SOLUTION/ DROPS OPHTHALMIC at 20:31

## 2019-08-28 RX ADMIN — SIMETHICONE SCH MG: 80 TABLET, CHEWABLE ORAL at 12:38

## 2019-08-28 RX ADMIN — SIMETHICONE SCH MG: 80 TABLET, CHEWABLE ORAL at 16:16

## 2019-08-28 RX ADMIN — SODIUM CHLORIDE SCH DRP: 50 SOLUTION/ DROPS OPHTHALMIC at 08:52

## 2019-08-28 RX ADMIN — SIMETHICONE SCH MG: 80 TABLET, CHEWABLE ORAL at 20:35

## 2019-08-28 RX ADMIN — BRIMONIDINE TARTRATE SCH DRP: 2 SOLUTION OPHTHALMIC at 08:52

## 2019-08-28 RX ADMIN — MULTIPLE VITAMINS W/ MINERALS TAB SCH TAB: TAB at 16:16

## 2019-08-28 RX ADMIN — MOMETASONE FUROATE AND FORMOTEROL FUMARATE DIHYDRATE SCH PUFF: 200; 5 AEROSOL RESPIRATORY (INHALATION) at 18:31

## 2019-08-28 RX ADMIN — SODIUM CHLORIDE SCH DRP: 50 SOLUTION/ DROPS OPHTHALMIC at 15:21

## 2019-08-28 RX ADMIN — MOMETASONE FUROATE AND FORMOTEROL FUMARATE DIHYDRATE SCH PUFF: 200; 5 AEROSOL RESPIRATORY (INHALATION) at 06:49

## 2019-08-28 RX ADMIN — MESALAMINE SCH MG: 1000 SUPPOSITORY RECTAL at 20:39

## 2019-08-28 RX ADMIN — BRIMONIDINE TARTRATE SCH DRP: 2 SOLUTION OPHTHALMIC at 15:21

## 2019-08-28 RX ADMIN — NYSTATIN SCH APPLIC: 100000 POWDER TOPICAL at 08:51

## 2019-08-28 RX ADMIN — LACTOBACILLUS ACIDOPH-L.BULGARICUS 1 MILLION CELL CHEWABLE TABLET SCH TAB: at 11:20

## 2019-08-28 NOTE — PRG
DATE OF SERVICE:  08/28/2019



SUBJECTIVE:  She seems worse this morning.  She is coughing more.  She did not sleep

well last night. 



OBJECTIVE:  VITAL SIGNS:  Temperature 97.7, pulse 111, blood pressure not available

for review, and O2 saturation 93% on 1.5 L. 

HEENT:  Unremarkable. 

NECK:  No JVD. 

LUNGS:  Coarse rhonchi bilaterally. 

CARDIAC:  S1, S2.  Regular. 

ABDOMEN:  Soft. 

EXTREMITIES:  No edema.



LABORATORY DATA:  No labs were done today.



ASSESSMENT:  Continued persistent asthmatic bronchitis with little discernible

improvement. 



PLAN:  

1. I will go ahead and add a secondary antibiotic.

2. Go back to IV steroids.

3. Continue aggressive pulmonary toilet measures.







Job ID:  [id number]

## 2019-08-28 NOTE — PDOC.HOSPP
- Subjective


Encounter Date: 08/28/19


Encounter Time: 11:41


Subjective: 


Morbidly obese female with debilitating MS associated with paraparesis and 

neurogenic bladder s/p chronic indwelling rothman, UC s/p diversion colostomy, 

recently discharge following treatment for COPD exacerbation, now readmitted 

with worsening SOB and cough. Cough reportedly worse overnight. No fever.





- Objective


Vital Signs & Weight: 


 Vital Signs (12 hours)











  Temp Pulse Resp BP Pulse Ox


 


 08/28/19 18:20   100  16   95


 


 08/28/19 16:00   101 H  20   94 L


 


 08/28/19 15:58   103 H    93 L


 


 08/28/19 10:34   90  20   96


 


 08/28/19 08:00  97.9 F  104 H  20  127/85  98








 Weight











Weight                         302 lb 11.2 oz














I&O: 


 











 08/27/19 08/28/19 08/29/19





 06:59 06:59 06:59


 


Intake Total 620 800 920


 


Output Total 1550 1500 1900


 


Balance -739 -700 -039











Result Diagrams: 


 08/27/19 05:21





 08/28/19 08:10





Hospitalist ROS





- Medication


Medications: 


Active Medications











Generic Name Dose Route Start Last Admin





  Trade Name Freq  PRN Reason Stop Dose Admin


 


Acidophilus  1 tab  08/23/19 12:00  08/28/19 11:20





  Floranex  PO   1 tab





  1200 NAGA   Administration





     





     





     





     


 


Albuterol/Ipratropium  3 ml  08/26/19 10:30  08/28/19 18:20





  Duoneb  EZPAP   3 ml





  [id number] NAGA   Administration





     





     





     





     


 


Arformoterol Tartrate  15 mcg  08/23/19 18:30  08/28/19 18:31





  Brovana  NEB   15 mcg





  BID-RT NAGA   Administration





     





     





     





     


 


Baclofen  15 mg  08/23/19 05:00  08/28/19 05:06





  Lioresal  PO   15 mg





  0500,2300 NAGA   Administration





     





     





     





     


 


Baclofen  5 mg  08/24/19 11:00  08/28/19 16:17





  Lioresal  PO   5 mg





  1100,1700 NAGA   Administration





     





     





     





     


 


Benzonatate  100 mg  08/22/19 21:42  08/26/19 05:52





  Tessalon  PO   100 mg





  Q4H PRN   Administration





  Cough   





     





     





     


 


Brimonidine Tartrate  1 drop  08/23/19 09:00  08/28/19 15:21





  Alphagan 0.2% Ophth Soln  R EYE   1 drp





  TID NAGA   Administration





     





     





     





     


 


Budesonide  0.5 mg  08/26/19 18:30  08/28/19 18:31





  Pulmicort Neb Solution  INH   0.5 mg





  BID-RT NAGA   Administration





     





     





     





     


 


Cholecalciferol  5,000 units  08/23/19 09:00  08/28/19 08:49





  Vitamin D3  PO   5,000 units





  DAILY NAGA   Administration





     





     





     





     


 


Clotrimazole  1 gm  08/23/19 21:00  08/28/19 08:52





  Lotrimin 1% Cream  TOP   1 applic





  BID NAGA   Administration





     





     





     





     


 


Cyclobenzaprine HCl  10 mg  08/23/19 09:00  08/28/19 15:22





  Flexeril  PO   10 mg





  TID NAGA   Administration





     





     





     





     


 


Docusate Sodium  100 mg  08/23/19 21:00  08/27/19 20:49





  Colace  PO   100 mg





  HS NAGA   Administration





     





     





     





     


 


Enoxaparin Sodium  40 mg  08/23/19 09:00  08/28/19 08:50





  Lovenox  SC   40 mg





  0900 NAGA   Administration





     





     





     





     


 


Famotidine  20 mg  08/23/19 09:00  08/28/19 08:50





  Pepcid  PO   20 mg





  BID NAGA   Administration





     





     





     





     


 


Fluoxetine HCl  20 mg  08/23/19 09:00  08/28/19 08:50





  Prozac  PO   20 mg





  BID NAGA   Administration





     





     





     





     


 


Gabapentin  800 mg  08/23/19 09:00  08/28/19 16:16





  Neurontin  PO   800 mg





  QID NAGA   Administration





     





     





     





     


 


Hyoscyamine Sulfate  0.25 mg  08/23/19 09:00  08/28/19 16:16





  Levsin  PO   0.25 mg





  QID NAGA   Administration





     





     





     





     


 


Cefepime HCl 1 gm/ Sodium  100 mls @ 200 mls/hr  08/28/19 09:00  08/28/19 09:36





  Chloride  IVPB   100 mls





  Q12HR NAGA   Administration





     





     





     





     


 


Iron/Minerals/Multivitamins  1 tab  08/23/19 17:00  08/28/19 16:16





  Theragran M  PO   1 tab





  QPM-WM NAGA   Administration





     





     





     





     


 


Latanoprost  1 drop  08/23/19 21:00  08/27/19 21:00





  Xalatan 0.005% Ophth Soln  R EYE   1 drp





  HS NAGA   Administration





     





     





     





     


 


Levofloxacin  750 mg  08/26/19 06:00  08/28/19 05:06





  Levaquin  PO  09/02/19 06:01  750 mg





  0600 NAGA   Administration





     





     





     





     


 


Mesalamine  1,000 mg  08/23/19 21:00  08/27/19 20:57





  Canasa  [place]   1,000 mg





  HS NAGA   Administration





     





     





     





     


 


Methylprednisolone Sodium Succinate  40 mg  08/28/19 09:00  08/28/19 15:22





  Solu-Medrol  IVP   40 mg





  0300,0900,1500,2100 NAGA   Administration





     





     





     





     


 


Metoprolol Tartrate  25 mg  08/28/19 09:00  08/28/19 08:50





  Lopressor  PO   25 mg





  BID NAGA   Administration





     





     





     





     


 


Mometasone Furoate/Formoterol Fumar  2 puff  08/24/19 18:30  08/28/19 18:31





  Dulera 200 Mcg/5 Mcg Inhaler  INH   2 puff





  BID-RT NAGA   Administration





     





     





     





     


 


Montelukast Sodium  10 mg  08/26/19 21:00  08/27/19 20:50





  Singulair  PO   10 mg





  QPM NAGA   Administration





     





     





     





     


 


Mupirocin  0 gm  08/22/19 21:40  08/25/19 08:45





  Bactroban 2% Ointment  TOP   1 applic





  PRN PRN   Administration





  Wound Care   





     





     





     


 


Nystatin  0 gm  08/23/19 09:00  08/28/19 08:51





  Mycostatin Powder  TOP   1 applic





  BID NAGA   Administration





     





     





     





     


 


Saccharomyces Boulardii  250 mg  08/23/19 09:00  08/28/19 08:50





  Florastor  PO   250 mg





  QAM NAGA   Administration





     





     





     





     


 


Simethicone  240 mg  08/23/19 09:00  08/28/19 16:16





  Mylicon Chewable  PO   240 mg





  QID NAGA   Administration





     





     





     





     


 


Sodium Chloride  10 ml  08/22/19 21:34  08/27/19 20:59





  Flush - Normal Saline  IVF   10 ml





  PRN PRN   Administration





  Saline Flush   





     





     





     


 


Sodium Chloride  1 drop  08/23/19 09:00  08/28/19 15:21





  Param-128 5% Oph Sol  R EYE   1 drp





  QID NAGA   Administration





     





     





     





     


 


Throat Lozenges  1 ernie  08/24/19 12:33  08/24/19 23:19





  Cepastat Lozenges  PO   1 ernie





  Q2H PRN   Administration





  Sore Throat   





     





     





     














- Exam


General Appearance: awake alert


General - other findings: obese


Eye: anicteric sclera


Neck: no JVD


Heart: RRR


Heart - other findings: tachycardic


Respiratory - other findings: fair air entry with transmitted sound vs crackles 

especially right base


Gastrointestinal: soft, non-tender, non-distended, normal bowel sounds


Gastrointestinal - other findings: colostomy noted


Extremities: no edema


Neurological: CN's grossly intact, no new deficit


Psychiatric: A&O x 3





Hosp A/P


(1) Acute respiratory failure with hypoxia


Code(s): J96.01 - ACUTE RESPIRATORY FAILURE WITH HYPOXIA   Status: Acute   





(2) COPD exacerbation


Code(s): J44.1 - CHRONIC OBSTRUCTIVE PULMONARY DISEASE W (ACUTE) EXACERBATION   

Status: Acute   





(3) Acute bronchitis


Code(s): J20.9 - ACUTE BRONCHITIS, UNSPECIFIED   Status: Acute   





(4) Complicated UTI (urinary tract infection)


Code(s): N39.0 - URINARY TRACT INFECTION, SITE NOT SPECIFIED   Status: Acute   





(5) Colostomy in place


Code(s): Z93.3 - COLOSTOMY STATUS   Status: Chronic   





(6) Functional quadriplegia secondary to MS


Code(s): G35 - MULTIPLE SCLEROSIS; R53.2 - FUNCTIONAL QUADRIPLEGIA   Status: 

Chronic   





(7) Morbid obesity


Code(s): E66.01 - MORBID (SEVERE) OBESITY DUE TO EXCESS CALORIES   Status: 

Chronic   





(8) Multiple sclerosis, secondary progressive


Code(s): G35 - MULTIPLE SCLEROSIS   Status: Chronic   





(9) Neurogenic bladder


Code(s): N31.9 - NEUROMUSCULAR DYSFUNCTION OF BLADDER, UNSPECIFIED   Status: 

Chronic   





(10) Ulcerative colitis


Code(s): K51.90 - ULCERATIVE COLITIS, UNSPECIFIED, WITHOUT COMPLICATIONS   

Status: Chronic   





(11) Asthma exacerbation


Code(s): J45.901 - UNSPECIFIED ASTHMA WITH (ACUTE) EXACERBATION   Status: Acute

   


Qualifiers: 


   Asthma severity: moderate 





(12) Sepsis


Code(s): A41.9 - SEPSIS, UNSPECIFIED ORGANISM   Status: Resolved   





- Plan


Continue bronchodilators, steroid, oxygen and antibiotics.


Continue EZpap and acapella as per Pulmonary


Wean oxygen as tolerated.


Continue other supportive care.


Continue low dose metoprolol for tachycardia. get repeat D dimer and get CTA 

chest is D dimer is elevated. Had CTA chest recently but contrast timing was 

inadequate.


Continue PT

## 2019-08-29 RX ADMIN — LACTOBACILLUS ACIDOPH-L.BULGARICUS 1 MILLION CELL CHEWABLE TABLET SCH TAB: at 12:53

## 2019-08-29 RX ADMIN — SODIUM CHLORIDE SCH DRP: 50 SOLUTION/ DROPS OPHTHALMIC at 12:54

## 2019-08-29 RX ADMIN — MOMETASONE FUROATE AND FORMOTEROL FUMARATE DIHYDRATE SCH PUFF: 200; 5 AEROSOL RESPIRATORY (INHALATION) at 06:31

## 2019-08-29 RX ADMIN — MULTIPLE VITAMINS W/ MINERALS TAB SCH TAB: TAB at 16:23

## 2019-08-29 RX ADMIN — MESALAMINE SCH MG: 1000 SUPPOSITORY RECTAL at 20:47

## 2019-08-29 RX ADMIN — BRIMONIDINE TARTRATE SCH DRP: 2 SOLUTION OPHTHALMIC at 16:25

## 2019-08-29 RX ADMIN — CLOTRIMAZOLE SCH APPLIC: 1 CREAM TOPICAL at 20:49

## 2019-08-29 RX ADMIN — SIMETHICONE SCH MG: 80 TABLET, CHEWABLE ORAL at 16:24

## 2019-08-29 RX ADMIN — SODIUM CHLORIDE SCH DRP: 50 SOLUTION/ DROPS OPHTHALMIC at 16:25

## 2019-08-29 RX ADMIN — NYSTATIN SCH APPLIC: 100000 POWDER TOPICAL at 20:49

## 2019-08-29 RX ADMIN — BRIMONIDINE TARTRATE SCH DRP: 2 SOLUTION OPHTHALMIC at 20:45

## 2019-08-29 RX ADMIN — NYSTATIN SCH APPLIC: 100000 POWDER TOPICAL at 09:19

## 2019-08-29 RX ADMIN — SIMETHICONE SCH MG: 80 TABLET, CHEWABLE ORAL at 20:51

## 2019-08-29 RX ADMIN — SIMETHICONE SCH MG: 80 TABLET, CHEWABLE ORAL at 09:17

## 2019-08-29 RX ADMIN — SIMETHICONE SCH MG: 80 TABLET, CHEWABLE ORAL at 12:52

## 2019-08-29 RX ADMIN — SODIUM CHLORIDE SCH DRP: 50 SOLUTION/ DROPS OPHTHALMIC at 20:44

## 2019-08-29 RX ADMIN — GUAIFENESIN AND DEXTROMETHORPHAN HYDROBROMIDE SCH TAB: 600; 30 TABLET, EXTENDED RELEASE ORAL at 09:17

## 2019-08-29 RX ADMIN — SODIUM CHLORIDE SCH DRP: 50 SOLUTION/ DROPS OPHTHALMIC at 09:20

## 2019-08-29 RX ADMIN — GUAIFENESIN AND DEXTROMETHORPHAN HYDROBROMIDE SCH TAB: 600; 30 TABLET, EXTENDED RELEASE ORAL at 20:54

## 2019-08-29 RX ADMIN — BRIMONIDINE TARTRATE SCH DRP: 2 SOLUTION OPHTHALMIC at 09:19

## 2019-08-29 RX ADMIN — MOMETASONE FUROATE AND FORMOTEROL FUMARATE DIHYDRATE SCH PUFF: 200; 5 AEROSOL RESPIRATORY (INHALATION) at 18:32

## 2019-08-29 RX ADMIN — CLOTRIMAZOLE SCH APPLIC: 1 CREAM TOPICAL at 09:19

## 2019-08-29 NOTE — PRG
DATE OF SERVICE:  08/29/2019



SUBJECTIVE:  Still struggling with secretions.  Does not feel much different than

yesterday. 



OBJECTIVE:  VITAL SIGNS:  O2 saturation 96%, respiratory rate 20, pulse 100,

temperature 98.1. 

HEENT:  Unremarkable. 

NECK:  No JVD. 

LUNGS:  Coarse rhonchi bilaterally. 

CARDIAC:  S1 and S2.  Regular. 

ABDOMEN:  Soft. 

EXTREMITIES:  No edema.



ASSESSMENT:  

1. Asthmatic bronchitis.

2. Poor mucus clearance.

3. Multiple sclerosis.



PLAN:  Continue steroids, bronchodilators, EzPAP, flutter valve.  I will put her on

a scheduled mucolytic. 







Job ID:  [id number]

## 2019-08-29 NOTE — PDOC.HOSPP
- Subjective


Encounter Date: 08/29/19


Encounter Time: 13:18


Subjective: 


Morbidly obese female with debilitating MS associated with paraparesis and 

neurogenic bladder s/p chronic indwelling rothman, UC s/p diversion colostomy, 

recently discharge following treatment for COPD exacerbation, now readmitted 

with worsening SOB and cough. Still with wet cough with minimal sputum 

production. Remained afebrile.





- Objective


Vital Signs & Weight: 


 Vital Signs (12 hours)











  Temp Pulse Resp BP Pulse Ox


 


 08/29/19 14:36   101 H  20   93 L


 


 08/29/19 10:28   97  20   94 L


 


 08/29/19 08:00  98.4 F    127/89  94 L


 


 08/29/19 06:35      96


 


 08/29/19 06:34   100  20   96


 


 08/29/19 06:31   100  20   96








 Weight











Weight                         302 lb 11.2 oz














I&O: 


 











 08/28/19 08/29/19 08/30/19





 06:59 06:59 06:59


 


Intake Total 800 1830 1710


 


Output Total 1500 3300 2120


 


Balance -700 -1470 -410











Result Diagrams: 


 08/27/19 05:21





 08/28/19 08:10





Hospitalist ROS





- Medication


Medications: 


Active Medications











Generic Name Dose Route Start Last Admin





  Trade Name Freq  PRN Reason Stop Dose Admin


 


Acidophilus  1 tab  08/23/19 12:00  08/29/19 12:53





  Floranex  PO   1 tab





  1200 NAGA   Administration





     





     





     





     


 


Albuterol/Ipratropium  3 ml  08/26/19 10:30  08/29/19 14:36





  Duoneb  EZPAP   3 ml





  [id number] NAGA   Administration





     





     





     





     


 


Arformoterol Tartrate  15 mcg  08/23/19 18:30  08/29/19 06:34





  Brovana  NEB   15 mcg





  BID-RT NAGA   Administration





     





     





     





     


 


Baclofen  15 mg  08/23/19 05:00  08/29/19 05:41





  Lioresal  PO   15 mg





  0500,2300 NAGA   Administration





     





     





     





     


 


Baclofen  5 mg  08/24/19 11:00  08/29/19 16:22





  Lioresal  PO   5 mg





  1100,1700 NAGA   Administration





     





     





     





     


 


Benzonatate  100 mg  08/22/19 21:42  08/26/19 05:52





  Tessalon  PO   100 mg





  Q4H PRN   Administration





  Cough   





     





     





     


 


Brimonidine Tartrate  1 drop  08/23/19 09:00  08/29/19 16:25





  Alphagan 0.2% Ophth Soln  R EYE   1 drp





  TID NAGA   Administration





     





     





     





     


 


Budesonide  0.5 mg  08/26/19 18:30  08/29/19 06:35





  Pulmicort Neb Solution  INH   0.5 mg





  BID-RT NAGA   Administration





     





     





     





     


 


Cholecalciferol  5,000 units  08/23/19 09:00  08/29/19 09:17





  Vitamin D3  PO   5,000 units





  DAILY NAGA   Administration





     





     





     





     


 


Clotrimazole  1 gm  08/23/19 21:00  08/29/19 09:19





  Lotrimin 1% Cream  TOP   1 applic





  BID NAGA   Administration





     





     





     





     


 


Cyclobenzaprine HCl  10 mg  08/23/19 09:00  08/29/19 16:23





  Flexeril  PO   10 mg





  TID NAGA   Administration





     





     





     





     


 


Docusate Sodium  100 mg  08/23/19 21:00  08/28/19 20:37





  Colace  PO   100 mg





  HS NAGA   Administration





     





     





     





     


 


Enoxaparin Sodium  40 mg  08/23/19 09:00  08/29/19 09:16





  Lovenox  SC   40 mg





  0900 NAGA   Administration





     





     





     





     


 


Famotidine  20 mg  08/23/19 09:00  08/29/19 09:18





  Pepcid  PO   20 mg





  BID NAGA   Administration





     





     





     





     


 


Fluoxetine HCl  20 mg  08/23/19 09:00  08/29/19 09:17





  Prozac  PO   20 mg





  BID NAGA   Administration





     





     





     





     


 


Gabapentin  800 mg  08/23/19 09:00  08/29/19 16:23





  Neurontin  PO   800 mg





  QID NAGA   Administration





     





     





     





     


 


Guaifenesin/Dextromethorphan  1 tab  08/29/19 09:00  08/29/19 09:17





  Mucinex Dm  PO   1 tab





  Q12HR NAGA   Administration





     





     





     





     


 


Hyoscyamine Sulfate  0.25 mg  08/23/19 09:00  08/29/19 16:23





  Levsin  PO   0.25 mg





  QID NAGA   Administration





     





     





     





     


 


Cefepime HCl 1 gm/ Sodium  100 mls @ 200 mls/hr  08/28/19 09:00  08/29/19 09:16





  Chloride  IVPB   100 mls





  Q12HR NAGA   Administration





     





     





     





     


 


Iron/Minerals/Multivitamins  1 tab  08/23/19 17:00  08/29/19 16:23





  Theragran M  PO   1 tab





  QPM-WM NAGA   Administration





     





     





     





     


 


Latanoprost  1 drop  08/23/19 21:00  08/28/19 20:31





  Xalatan 0.005% Ophth Soln  R EYE   1 drp





  HS NAGA   Administration





     





     





     





     


 


Levofloxacin  750 mg  08/26/19 06:00  08/29/19 05:40





  Levaquin  PO  09/02/19 06:01  750 mg





  0600 NAGA   Administration





     





     





     





     


 


Mesalamine  1,000 mg  08/23/19 21:00  08/28/19 20:39





  Canasa  [place]   1,000 mg





  HS NAGA   Administration





     





     





     





     


 


Methylprednisolone Sodium Succinate  40 mg  08/28/19 09:00  08/29/19 16:24





  Solu-Medrol  IVP   40 mg





  0300,0900,1500,2100 NAGA   Administration





     





     





     





     


 


Metoprolol Tartrate  25 mg  08/28/19 09:00  08/29/19 09:18





  Lopressor  PO   25 mg





  BID NAGA   Administration





     





     





     





     


 


Mometasone Furoate/Formoterol Fumar  2 puff  08/24/19 18:30  08/29/19 06:31





  Dulera 200 Mcg/5 Mcg Inhaler  INH   2 puff





  BID-RT NAGA   Administration





     





     





     





     


 


Montelukast Sodium  10 mg  08/26/19 21:00  08/28/19 20:39





  Singulair  PO   10 mg





  QPM NAGA   Administration





     





     





     





     


 


Mupirocin  0 gm  08/22/19 21:40  08/25/19 08:45





  Bactroban 2% Ointment  TOP   1 applic





  PRN PRN   Administration





  Wound Care   





     





     





     


 


Nystatin  0 gm  08/23/19 09:00  08/29/19 09:19





  Mycostatin Powder  TOP   1 applic





  BID NAGA   Administration





     





     





     





     


 


Saccharomyces Boulardii  250 mg  08/23/19 09:00  08/29/19 09:18





  Florastor  PO   250 mg





  QAM NAGA   Administration





     





     





     





     


 


Simethicone  240 mg  08/23/19 09:00  08/29/19 16:24





  Mylicon Chewable  PO   240 mg





  QID NAGA   Administration





     





     





     





     


 


Sodium Chloride  10 ml  08/22/19 21:34  08/27/19 20:59





  Flush - Normal Saline  IVF   10 ml





  PRN PRN   Administration





  Saline Flush   





     





     





     


 


Sodium Chloride  1 drop  08/23/19 09:00  08/29/19 16:25





  Param-128 5% Oph Sol  R EYE   1 drp





  QID NAGA   Administration





     





     





     





     


 


Throat Lozenges  1 ernie  08/24/19 12:33  08/24/19 23:19





  Cepastat Lozenges  PO   1 ernie





  Q2H PRN   Administration





  Sore Throat   





     





     





     














- Exam


General Appearance: awake alert


General - other findings: obese


Eye: anicteric sclera


ENT: normocephalic atraumatic


Heart: RRR


Respiratory: no wheezes, no ronchi, no tachypnea


Respiratory - other findings: fair air entry with bilaateral transmitted sound +

/- crackles.


Gastrointestinal: soft, non-tender, non-distended, normal bowel sounds


Gastrointestinal - other findings: Colostomy noted


Extremities: no cyanosis, no edema


Neurological: CN's grossly intact, no new deficit


Psychiatric: normal affect, A&O x 3





Hosp A/P


(1) Acute respiratory failure with hypoxia


Code(s): J96.01 - ACUTE RESPIRATORY FAILURE WITH HYPOXIA   Status: Suspected   





(2) COPD exacerbation


Code(s): J44.1 - CHRONIC OBSTRUCTIVE PULMONARY DISEASE W (ACUTE) EXACERBATION   

Status: Acute   





(3) Acute bronchitis


Code(s): J20.9 - ACUTE BRONCHITIS, UNSPECIFIED   Status: Acute   





(4) Complicated UTI (urinary tract infection)


Code(s): N39.0 - URINARY TRACT INFECTION, SITE NOT SPECIFIED   Status: Acute   





(5) Colostomy in place


Code(s): Z93.3 - COLOSTOMY STATUS   Status: Chronic   





(6) Functional quadriplegia secondary to MS


Code(s): G35 - MULTIPLE SCLEROSIS; R53.2 - FUNCTIONAL QUADRIPLEGIA   Status: 

Chronic   





(7) Morbid obesity


Code(s): E66.01 - MORBID (SEVERE) OBESITY DUE TO EXCESS CALORIES   Status: 

Chronic   





(8) Multiple sclerosis, secondary progressive


Code(s): G35 - MULTIPLE SCLEROSIS   Status: Chronic   





(9) Neurogenic bladder


Code(s): N31.9 - NEUROMUSCULAR DYSFUNCTION OF BLADDER, UNSPECIFIED   Status: 

Chronic   





(10) Ulcerative colitis


Code(s): K51.90 - ULCERATIVE COLITIS, UNSPECIFIED, WITHOUT COMPLICATIONS   

Status: Chronic   





(11) Asthma exacerbation


Code(s): J45.901 - UNSPECIFIED ASTHMA WITH (ACUTE) EXACERBATION   Status: Acute

   


Qualifiers: 


   Asthma severity: moderate 





(12) Sepsis


Code(s): A41.9 - SEPSIS, UNSPECIFIED ORGANISM   Status: Resolved   





- Plan


Continue bronchodilators, steroid, oxygen.


Antibiotics broadened by pulmonary


Continue EZpap and acapella as per Pulmonary.


Continue other supportive care.


Continue low dose metoprolol for tachycardia. PE unlikely with normal D dimer


Continue PT


For discharge once cleared by pulmonary

## 2019-08-30 LAB
ALBUMIN SERPL BCG-MCNC: 3.4 G/DL (ref 3.5–5)
ALP SERPL-CCNC: 85 U/L (ref 40–150)
ALT SERPL W P-5'-P-CCNC: 33 U/L (ref 8–55)
ANION GAP SERPL CALC-SCNC: 14 MMOL/L (ref 10–20)
AST SERPL-CCNC: 15 U/L (ref 5–34)
BILIRUB SERPL-MCNC: 0.2 MG/DL (ref 0.2–1.2)
BUN SERPL-MCNC: 18 MG/DL (ref 9.8–20.1)
CALCIUM SERPL-MCNC: 9.3 MG/DL (ref 7.8–10.44)
CHLORIDE SERPL-SCNC: 107 MMOL/L (ref 98–107)
CO2 SERPL-SCNC: 23 MMOL/L (ref 22–29)
CREAT CL PREDICTED SERPL C-G-VRATE: 222 ML/MIN (ref 70–130)
CRP SERPL-MCNC: (no result) MG/DL
GLOBULIN SER CALC-MCNC: 2.7 G/DL (ref 2.4–3.5)
GLUCOSE SERPL-MCNC: 142 MG/DL (ref 70–105)
HGB BLD-MCNC: 15.4 G/DL (ref 12–16)
MCH RBC QN AUTO: 28.6 PG (ref 27–31)
MCV RBC AUTO: 89 FL (ref 78–98)
MDIFF COMPLETE?: YES
PLATELET # BLD AUTO: 496 THOU/UL (ref 130–400)
POTASSIUM SERPL-SCNC: 4.5 MMOL/L (ref 3.5–5.1)
RBC # BLD AUTO: 5.38 MILL/UL (ref 4.2–5.4)
SODIUM SERPL-SCNC: 139 MMOL/L (ref 136–145)
WBC # BLD AUTO: 23.4 THOU/UL (ref 4.8–10.8)

## 2019-08-30 RX ADMIN — SODIUM CHLORIDE SCH DRP: 50 SOLUTION/ DROPS OPHTHALMIC at 11:50

## 2019-08-30 RX ADMIN — SIMETHICONE SCH MG: 80 TABLET, CHEWABLE ORAL at 11:46

## 2019-08-30 RX ADMIN — NYSTATIN SCH APPLIC: 100000 POWDER TOPICAL at 08:43

## 2019-08-30 RX ADMIN — LACTOBACILLUS ACIDOPH-L.BULGARICUS 1 MILLION CELL CHEWABLE TABLET SCH TAB: at 11:47

## 2019-08-30 RX ADMIN — SODIUM CHLORIDE SCH DRP: 50 SOLUTION/ DROPS OPHTHALMIC at 21:09

## 2019-08-30 RX ADMIN — Medication PRN LOZ: at 16:10

## 2019-08-30 RX ADMIN — GUAIFENESIN AND DEXTROMETHORPHAN HYDROBROMIDE SCH TAB: 600; 30 TABLET, EXTENDED RELEASE ORAL at 08:40

## 2019-08-30 RX ADMIN — NYSTATIN SCH APPLIC: 100000 POWDER TOPICAL at 21:16

## 2019-08-30 RX ADMIN — MULTIPLE VITAMINS W/ MINERALS TAB SCH TAB: TAB at 16:11

## 2019-08-30 RX ADMIN — SIMETHICONE SCH MG: 80 TABLET, CHEWABLE ORAL at 16:10

## 2019-08-30 RX ADMIN — MOMETASONE FUROATE AND FORMOTEROL FUMARATE DIHYDRATE SCH PUFF: 200; 5 AEROSOL RESPIRATORY (INHALATION) at 18:55

## 2019-08-30 RX ADMIN — MESALAMINE SCH MG: 1000 SUPPOSITORY RECTAL at 20:54

## 2019-08-30 RX ADMIN — CLOTRIMAZOLE SCH APPLIC: 1 CREAM TOPICAL at 21:15

## 2019-08-30 RX ADMIN — GUAIFENESIN AND DEXTROMETHORPHAN HYDROBROMIDE SCH TAB: 600; 30 TABLET, EXTENDED RELEASE ORAL at 21:19

## 2019-08-30 RX ADMIN — MOMETASONE FUROATE AND FORMOTEROL FUMARATE DIHYDRATE SCH PUFF: 200; 5 AEROSOL RESPIRATORY (INHALATION) at 06:37

## 2019-08-30 RX ADMIN — BRIMONIDINE TARTRATE SCH DRP: 2 SOLUTION OPHTHALMIC at 16:11

## 2019-08-30 RX ADMIN — CLOTRIMAZOLE SCH APPLIC: 1 CREAM TOPICAL at 08:43

## 2019-08-30 RX ADMIN — BRIMONIDINE TARTRATE SCH DRP: 2 SOLUTION OPHTHALMIC at 08:42

## 2019-08-30 RX ADMIN — BRIMONIDINE TARTRATE SCH DRP: 2 SOLUTION OPHTHALMIC at 21:09

## 2019-08-30 RX ADMIN — SODIUM CHLORIDE SCH DRP: 50 SOLUTION/ DROPS OPHTHALMIC at 08:42

## 2019-08-30 RX ADMIN — SODIUM CHLORIDE SCH DRP: 50 SOLUTION/ DROPS OPHTHALMIC at 16:11

## 2019-08-30 RX ADMIN — SIMETHICONE SCH MG: 80 TABLET, CHEWABLE ORAL at 08:39

## 2019-08-30 RX ADMIN — SIMETHICONE SCH MG: 80 TABLET, CHEWABLE ORAL at 21:10

## 2019-08-30 NOTE — PRG
DATE OF SERVICE:  08/30/2019



SUBJECTIVE:  The patient says she still feels terrible and is having difficulty

breathing at night honestly when she does not know someone is observing her.  She

looks quite comfortable. 



OBJECTIVE:  VITAL SIGNS:  Temperature 98.4, pulse 96, respirations 18, O2 saturation

95%, and blood pressure 129/75. 

HEENT:  Unremarkable. 

NECK:  No adenopathy.  No JVD. 

LUNGS:  Fairly clear anteriorly today. 

CARDIAC:  S1, S2.  Regular. 

ABDOMEN:  Soft. 

EXTREMITIES:  No edema.



LABORATORY DATA:  White blood cell count 23.4, hematocrit 27.9, and platelet count

496.  Sodium 139, potassium 4.5, chloride 107, CO2 of 23, BUN 18, creatinine 0.6,

and glucose 142. 



ASSESSMENT:  

1. Chronic respiratory insufficiency secondary to asthmatic bronchitis and poor

neuromuscular reflexes from her multiple sclerosis. 

2. Multiple sclerosis.



PLAN:  I told her I think we are doing about as much as we can with the steroids,

antibiotics, bronchodilators, flutter valve, EzPAP, mucolytics, etc.  She may at

some point need a sleep study, but the logistics of getting her to the Sleep [facility] in

getting this done are almost impossible.  Blood gases obtained in the past do not

show any 

evidence of hypercapnia.  Therefore, she would not qualify for noninvasive

ventilation via neuromuscular compassionate use. 



I would really recommend that she be in a nursing home, although I think she would

outrightly refuse to do that.  Short of that, I think we need to convince her, it is

time to go home. 





Job ID:  [id number]

## 2019-08-30 NOTE — PDOC.HOSPP
- Subjective


Encounter Date: 08/30/19


Encounter Time: 11:05


Subjective: 


Morbidly obese female with debilitating MS associated with paraparesis and 

neurogenic bladder s/p chronic indwelling rothman, UC s/p diversion colostomy, 

recently discharge following treatment for COPD exacerbation, now readmitted 

with worsening SOB and cough. Still with wet cough with minimal sputum 

production. REstarted on oxygen last night after success weaning previously. 

Remained afebrile.





- Objective


Vital Signs & Weight: 


 Vital Signs (12 hours)











  Pulse Resp Pulse Ox


 


 08/30/19 18:54  99  18  93 L


 


 08/30/19 15:14   18  96


 


 08/30/19 14:21  108 H  16 


 


 08/30/19 10:01  105 H  16 








 Weight











Weight                         302 lb 11.2 oz














I&O: 


 











 08/29/19 08/30/19 08/31/19





 06:59 06:59 06:59


 


Intake Total 1830 2630 2260


 


Output Total 3300 3370 2150


 


Balance -1470 -740 110











Result Diagrams: 


 08/30/19 05:06





 08/30/19 05:06





Hospitalist ROS





- Medication


Medications: 


Active Medications











Generic Name Dose Route Start Last Admin





  Trade Name Freq  PRN Reason Stop Dose Admin


 


Acidophilus  1 tab  08/23/19 12:00  08/30/19 11:47





  Floranex  PO   1 tab





  1200 NAGA   Administration





     





     





     





     


 


Albuterol/Ipratropium  3 ml  08/26/19 10:30  08/30/19 18:54





  Duoneb  EZPAP   3 ml





  [id number] NAGA   Administration





     





     





     





     


 


Arformoterol Tartrate  15 mcg  08/23/19 18:30  08/30/19 18:55





  Brovana  NEB   15 mcg





  BID-RT NAGA   Administration





     





     





     





     


 


Baclofen  15 mg  08/23/19 05:00  08/30/19 05:41





  Lioresal  PO   15 mg





  0500,2300 NAGA   Administration





     





     





     





     


 


Baclofen  5 mg  08/24/19 11:00  08/30/19 16:11





  Lioresal  PO   5 mg





  1100,1700 NAGA   Administration





     





     





     





     


 


Benzonatate  100 mg  08/22/19 21:42  08/26/19 05:52





  Tessalon  PO   100 mg





  Q4H PRN   Administration





  Cough   





     





     





     


 


Brimonidine Tartrate  1 drop  08/23/19 09:00  08/30/19 16:11





  Alphagan 0.2% Ophth Soln  R EYE   1 drp





  TID NAGA   Administration





     





     





     





     


 


Budesonide  0.5 mg  08/26/19 18:30  08/30/19 18:55





  Pulmicort Neb Solution  INH   0.5 mg





  BID-RT NAGA   Administration





     





     





     





     


 


Cholecalciferol  5,000 units  08/23/19 09:00  08/30/19 08:40





  Vitamin D3  PO   5,000 units





  DAILY NAGA   Administration





     





     





     





     


 


Clotrimazole  1 gm  08/23/19 21:00  08/30/19 08:43





  Lotrimin 1% Cream  TOP   1 applic





  BID NAGA   Administration





     





     





     





     


 


Cyclobenzaprine HCl  10 mg  08/23/19 09:00  08/30/19 16:11





  Flexeril  PO   10 mg





  TID NAGA   Administration





     





     





     





     


 


Docusate Sodium  100 mg  08/23/19 21:00  08/29/19 20:48





  Colace  PO   100 mg





  HS NAGA   Administration





     





     





     





     


 


Enoxaparin Sodium  40 mg  08/23/19 09:00  08/30/19 08:41





  Lovenox  SC   40 mg





  0900 NAGA   Administration





     





     





     





     


 


Famotidine  20 mg  08/23/19 09:00  08/30/19 08:38





  Pepcid  PO   20 mg





  BID NAGA   Administration





     





     





     





     


 


Fluoxetine HCl  20 mg  08/23/19 09:00  08/30/19 08:39





  Prozac  PO   20 mg





  BID NAGA   Administration





     





     





     





     


 


Gabapentin  800 mg  08/23/19 09:00  08/30/19 16:10





  Neurontin  PO   800 mg





  QID NAGA   Administration





     





     





     





     


 


Guaifenesin/Dextromethorphan  1 tab  08/29/19 09:00  08/30/19 08:40





  Mucinex Dm  PO   1 tab





  Q12HR NAGA   Administration





     





     





     





     


 


Hyoscyamine Sulfate  0.25 mg  08/23/19 09:00  08/30/19 16:10





  Levsin  PO   0.25 mg





  QID NAGA   Administration





     





     





     





     


 


Cefepime HCl 1 gm/ Sodium  100 mls @ 200 mls/hr  08/28/19 09:00  08/30/19 08:41





  Chloride  IVPB   100 mls





  Q12HR NAGA   Administration





     





     





     





     


 


Iron/Minerals/Multivitamins  1 tab  08/23/19 17:00  08/30/19 16:11





  Theragran M  PO   1 tab





  QPM-WM NAGA   Administration





     





     





     





     


 


Latanoprost  1 drop  08/23/19 21:00  08/29/19 20:45





  Xalatan 0.005% Ophth Soln  R EYE   1 drp





  HS NAGA   Administration





     





     





     





     


 


Levofloxacin  750 mg  08/26/19 06:00  08/30/19 05:41





  Levaquin  PO  09/02/19 06:01  750 mg





  0600 NAGA   Administration





     





     





     





     


 


Mesalamine  1,000 mg  08/23/19 21:00  08/29/19 20:47





  Canasa  [place]   1,000 mg





  HS NAGA   Administration





     





     





     





     


 


Methylprednisolone Sodium Succinate  20 mg  08/30/19 09:00  08/30/19 09:35





  Solu-Medrol  IVP   20 mg





  0900,2100 NAGA   Administration





     





     





     





     


 


Metoprolol Tartrate  25 mg  08/28/19 09:00  08/30/19 08:39





  Lopressor  PO   25 mg





  BID NAGA   Administration





     





     





     





     


 


Mometasone Furoate/Formoterol Fumar  2 puff  08/24/19 18:30  08/30/19 18:55





  Dulera 200 Mcg/5 Mcg Inhaler  INH   2 puff





  BID-RT NAGA   Administration





     





     





     





     


 


Montelukast Sodium  10 mg  08/26/19 21:00  08/29/19 20:48





  Singulair  PO   10 mg





  QPM NAGA   Administration





     





     





     





     


 


Mupirocin  0 gm  08/22/19 21:40  08/25/19 08:45





  Bactroban 2% Ointment  TOP   1 applic





  PRN PRN   Administration





  Wound Care   





     





     





     


 


Nystatin  0 gm  08/23/19 09:00  08/30/19 08:43





  Mycostatin Powder  TOP   1 applic





  BID NAGA   Administration





     





     





     





     


 


Saccharomyces Boulardii  250 mg  08/23/19 09:00  08/30/19 08:39





  Florastor  PO   250 mg





  QAM NAGA   Administration





     





     





     





     


 


Simethicone  240 mg  08/23/19 09:00  08/30/19 16:10





  Mylicon Chewable  PO   240 mg





  QID NAAG   Administration





     





     





     





     


 


Sodium Chloride  10 ml  08/22/19 21:34  08/27/19 20:59





  Flush - Normal Saline  IVF   10 ml





  PRN PRN   Administration





  Saline Flush   





     





     





     


 


Sodium Chloride  1 drop  08/23/19 09:00  08/30/19 16:11





  Param-128 5% Oph Sol  R EYE   1 drp





  QID NAGA   Administration





     





     





     





     


 


Throat Lozenges  1 ernie  08/24/19 12:33  08/30/19 16:10





  Cepastat Lozenges  PO   1 ernie





  Q2H PRN   Administration





  Sore Throat   





     





     





     














- Exam


General Appearance: awake alert


Eye: anicteric sclera


ENT: normocephalic atraumatic


Heart: RRR


Respiratory: no wheezes, no ronchi


Respiratory - other findings: fair air entry with transmitted sound


Gastrointestinal: soft, non-tender, non-distended, normal bowel sounds


Extremities: no cyanosis, no edema


Neurological: CN's grossly intact, no new deficit


Musculoskeletal - other findings: disuse atrophy of lower limbs noted


Psychiatric: A&O x 3





Hosp A/P


(1) Acute respiratory failure with hypoxia


Code(s): J96.01 - ACUTE RESPIRATORY FAILURE WITH HYPOXIA   Status: Suspected   





(2) COPD exacerbation


Code(s): J44.1 - CHRONIC OBSTRUCTIVE PULMONARY DISEASE W (ACUTE) EXACERBATION   

Status: Acute   





(3) Acute bronchitis


Code(s): J20.9 - ACUTE BRONCHITIS, UNSPECIFIED   Status: Acute   





(4) Complicated UTI (urinary tract infection)


Code(s): N39.0 - URINARY TRACT INFECTION, SITE NOT SPECIFIED   Status: Acute   





(5) Colostomy in place


Code(s): Z93.3 - COLOSTOMY STATUS   Status: Chronic   





(6) Functional quadriplegia secondary to MS


Code(s): G35 - MULTIPLE SCLEROSIS; R53.2 - FUNCTIONAL QUADRIPLEGIA   Status: 

Chronic   





(7) Morbid obesity


Code(s): E66.01 - MORBID (SEVERE) OBESITY DUE TO EXCESS CALORIES   Status: 

Chronic   





(8) Multiple sclerosis, secondary progressive


Code(s): G35 - MULTIPLE SCLEROSIS   Status: Chronic   





(9) Neurogenic bladder


Code(s): N31.9 - NEUROMUSCULAR DYSFUNCTION OF BLADDER, UNSPECIFIED   Status: 

Chronic   





(10) Ulcerative colitis


Code(s): K51.90 - ULCERATIVE COLITIS, UNSPECIFIED, WITHOUT COMPLICATIONS   

Status: Chronic   





(11) Asthma exacerbation


Code(s): J45.901 - UNSPECIFIED ASTHMA WITH (ACUTE) EXACERBATION   Status: Acute

   


Qualifiers: 


   Asthma severity: moderate 





(12) Sepsis


Code(s): A41.9 - SEPSIS, UNSPECIFIED ORGANISM   Status: Resolved   





- Plan


Continue bronchodilators, steroid, oxygen.


Continue antibiotics


Continue EZpap and acapella as per Pulmonary.


Continue other supportive care.


Continue low dose metoprolol for tachycardia.


Continue PT


For discharge once cleared by pulmonary

## 2019-08-31 RX ADMIN — BRIMONIDINE TARTRATE SCH DRP: 2 SOLUTION OPHTHALMIC at 09:13

## 2019-08-31 RX ADMIN — SIMETHICONE SCH MG: 80 TABLET, CHEWABLE ORAL at 12:22

## 2019-08-31 RX ADMIN — CLOTRIMAZOLE SCH APPLIC: 1 CREAM TOPICAL at 20:30

## 2019-08-31 RX ADMIN — SIMETHICONE SCH MG: 80 TABLET, CHEWABLE ORAL at 20:25

## 2019-08-31 RX ADMIN — NYSTATIN SCH APPLIC: 100000 POWDER TOPICAL at 20:29

## 2019-08-31 RX ADMIN — SODIUM CHLORIDE SCH DRP: 50 SOLUTION/ DROPS OPHTHALMIC at 16:20

## 2019-08-31 RX ADMIN — GUAIFENESIN AND DEXTROMETHORPHAN HYDROBROMIDE SCH TAB: 600; 30 TABLET, EXTENDED RELEASE ORAL at 20:27

## 2019-08-31 RX ADMIN — MOMETASONE FUROATE AND FORMOTEROL FUMARATE DIHYDRATE SCH PUFF: 200; 5 AEROSOL RESPIRATORY (INHALATION) at 06:33

## 2019-08-31 RX ADMIN — CLOTRIMAZOLE SCH APPLIC: 1 CREAM TOPICAL at 09:14

## 2019-08-31 RX ADMIN — SODIUM CHLORIDE SCH DRP: 50 SOLUTION/ DROPS OPHTHALMIC at 20:23

## 2019-08-31 RX ADMIN — ACETYLCYSTEINE SCH: 100 SOLUTION ORAL; RESPIRATORY (INHALATION) at 00:57

## 2019-08-31 RX ADMIN — ACETYLCYSTEINE SCH MG: 100 SOLUTION ORAL; RESPIRATORY (INHALATION) at 06:21

## 2019-08-31 RX ADMIN — SODIUM CHLORIDE SCH DRP: 50 SOLUTION/ DROPS OPHTHALMIC at 12:23

## 2019-08-31 RX ADMIN — SIMETHICONE SCH MG: 80 TABLET, CHEWABLE ORAL at 16:19

## 2019-08-31 RX ADMIN — MESALAMINE SCH MG: 1000 SUPPOSITORY RECTAL at 20:29

## 2019-08-31 RX ADMIN — MOMETASONE FUROATE AND FORMOTEROL FUMARATE DIHYDRATE SCH PUFF: 200; 5 AEROSOL RESPIRATORY (INHALATION) at 19:59

## 2019-08-31 RX ADMIN — NYSTATIN SCH APPLIC: 100000 POWDER TOPICAL at 09:13

## 2019-08-31 RX ADMIN — BRIMONIDINE TARTRATE SCH DRP: 2 SOLUTION OPHTHALMIC at 20:29

## 2019-08-31 RX ADMIN — ACETYLCYSTEINE SCH MG: 100 SOLUTION ORAL; RESPIRATORY (INHALATION) at 12:32

## 2019-08-31 RX ADMIN — ACETYLCYSTEINE SCH MG: 100 SOLUTION ORAL; RESPIRATORY (INHALATION) at 19:25

## 2019-08-31 RX ADMIN — SIMETHICONE SCH MG: 80 TABLET, CHEWABLE ORAL at 09:12

## 2019-08-31 RX ADMIN — GUAIFENESIN AND DEXTROMETHORPHAN HYDROBROMIDE SCH TAB: 600; 30 TABLET, EXTENDED RELEASE ORAL at 09:23

## 2019-08-31 RX ADMIN — BRIMONIDINE TARTRATE SCH DRP: 2 SOLUTION OPHTHALMIC at 16:20

## 2019-08-31 RX ADMIN — SODIUM CHLORIDE SCH DRP: 50 SOLUTION/ DROPS OPHTHALMIC at 09:13

## 2019-08-31 RX ADMIN — MULTIPLE VITAMINS W/ MINERALS TAB SCH TAB: TAB at 16:19

## 2019-08-31 RX ADMIN — LACTOBACILLUS ACIDOPH-L.BULGARICUS 1 MILLION CELL CHEWABLE TABLET SCH TAB: at 12:22

## 2019-08-31 NOTE — PDOC.HOSPP
- Subjective


Encounter Date: 08/31/19


Encounter Time: 12:33


Subjective: 


Morbidly obese female with debilitating MS associated with paraparesis and 

neurogenic bladder s/p chronic indwelling rothman, UC s/p diversion colostomy, 

recently discharge following treatment for COPD exacerbation, now readmitted 

with worsening SOB and cough. Cough is better with addition of mucomyst. 

Remained afebrile.





- Objective


Vital Signs & Weight: 


 Vital Signs (12 hours)











  Temp Pulse Resp BP Pulse Ox


 


 08/31/19 12:00   86  12  


 


 08/31/19 08:00  97.9 F  96  18  130/85  95


 


 08/31/19 06:33   92  12  


 


 08/31/19 06:15   93  12  


 


 08/31/19 06:05      95


 


 08/31/19 06:00   92  12  


 


 08/31/19 02:43   81  14   96








 Weight











Weight                         302 lb 11.2 oz














I&O: 


 











 08/30/19 08/31/19 09/01/19





 06:59 06:59 06:59


 


Intake Total 2630 2260 


 


Output Total 3370 3400 


 


Balance -740 -1140 











Result Diagrams: 


 08/30/19 05:06





 08/30/19 05:06





Hospitalist ROS





- Medication


Medications: 


Active Medications











Generic Name Dose Route Start Last Admin





  Trade Name Freq  PRN Reason Stop Dose Admin


 


Acetylcysteine  300 mg  08/31/19 01:00  08/31/19 12:32





  Mucomyst 10% (Oral Or Inh)  INH   300 mg





  [id number] NAGA   Administration





     





     





     





     


 


Acidophilus  1 tab  08/23/19 12:00  08/31/19 12:22





  Floranex  PO   1 tab





  1200 NAGA   Administration





     





     





     





     


 


Albuterol/Ipratropium  3 ml  08/26/19 10:30  08/31/19 12:00





  Duoneb  EZPAP   3 ml





  [id number] NAGA   Administration





     





     





     





     


 


Arformoterol Tartrate  15 mcg  08/23/19 18:30  08/31/19 06:15





  Brovana  NEB   15 mcg





  BID-RT NAGA   Administration





     





     





     





     


 


Baclofen  15 mg  08/23/19 05:00  08/31/19 05:27





  Lioresal  PO   15 mg





  0500,2300 NAGA   Administration





     





     





     





     


 


Baclofen  5 mg  08/24/19 11:00  08/31/19 12:23





  Lioresal  PO   5 mg





  1100,1700 NAGA   Administration





     





     





     





     


 


Benzonatate  100 mg  08/22/19 21:42  08/26/19 05:52





  Tessalon  PO   100 mg





  Q4H PRN   Administration





  Cough   





     





     





     


 


Brimonidine Tartrate  1 drop  08/23/19 09:00  08/31/19 09:13





  Alphagan 0.2% Ophth Soln  R EYE   1 drp





  TID NAGA   Administration





     





     





     





     


 


Budesonide  0.5 mg  08/26/19 18:30  08/31/19 06:00





  Pulmicort Neb Solution  INH   0.5 mg





  BID-RT NAGA   Administration





     





     





     





     


 


Cholecalciferol  5,000 units  08/23/19 09:00  08/31/19 09:11





  Vitamin D3  PO   5,000 units





  DAILY NAGA   Administration





     





     





     





     


 


Clotrimazole  1 gm  08/23/19 21:00  08/31/19 09:14





  Lotrimin 1% Cream  TOP   1 applic





  BID NAGA   Administration





     





     





     





     


 


Cyclobenzaprine HCl  10 mg  08/23/19 09:00  08/31/19 09:12





  Flexeril  PO   10 mg





  TID NAGA   Administration





     





     





     





     


 


Docusate Sodium  100 mg  08/23/19 21:00  08/30/19 21:09





  Colace  PO   100 mg





  HS NAGA   Administration





     





     





     





     


 


Enoxaparin Sodium  40 mg  08/23/19 09:00  08/31/19 09:12





  Lovenox  SC   40 mg





  0900 NAGA   Administration





     





     





     





     


 


Famotidine  20 mg  08/23/19 09:00  08/31/19 09:12





  Pepcid  PO   20 mg





  BID NAGA   Administration





     





     





     





     


 


Fluoxetine HCl  20 mg  08/23/19 09:00  08/31/19 09:12





  Prozac  PO   20 mg





  BID NAGA   Administration





     





     





     





     


 


Gabapentin  800 mg  08/23/19 09:00  08/31/19 12:22





  Neurontin  PO   800 mg





  QID NAGA   Administration





     





     





     





     


 


Guaifenesin/Dextromethorphan  1 tab  08/29/19 09:00  08/31/19 09:23





  Mucinex Dm  PO   1 tab





  Q12HR NAGA   Administration





     





     





     





     


 


Hyoscyamine Sulfate  0.25 mg  08/23/19 09:00  08/31/19 12:22





  Levsin  PO   0.25 mg





  QID NAGA   Administration





     





     





     





     


 


Cefepime HCl 1 gm/ Sodium  100 mls @ 200 mls/hr  08/28/19 09:00  08/31/19 09:14





  Chloride  IVPB   100 mls





  Q12HR NAGA   Administration





     





     





     





     


 


Iron/Minerals/Multivitamins  1 tab  08/23/19 17:00  08/30/19 16:11





  Theragran M  PO   1 tab





  QPM-WM NAGA   Administration





     





     





     





     


 


Latanoprost  1 drop  08/23/19 21:00  08/30/19 21:09





  Xalatan 0.005% Ophth Soln  R EYE   1 drp





  HS NAGA   Administration





     





     





     





     


 


Levofloxacin  750 mg  08/26/19 06:00  08/31/19 05:28





  Levaquin  PO  09/02/19 06:01  750 mg





  0600 NAGA   Administration





     





     





     





     


 


Mesalamine  1,000 mg  08/23/19 21:00  08/30/19 20:54





  Canasa  [place]   1,000 mg





  HS NAGA   Administration





     





     





     





     


 


Methylprednisolone Sodium Succinate  20 mg  08/30/19 09:00  08/31/19 09:13





  Solu-Medrol  IVP   20 mg





  0900,2100 NAGA   Administration





     





     





     





     


 


Metoprolol Tartrate  25 mg  08/28/19 09:00  08/31/19 09:12





  Lopressor  PO   25 mg





  BID NAGA   Administration





     





     





     





     


 


Mometasone Furoate/Formoterol Fumar  2 puff  08/24/19 18:30  08/31/19 06:33





  Dulera 200 Mcg/5 Mcg Inhaler  INH   2 puff





  BID-RT NAGA   Administration





     





     





     





     


 


Montelukast Sodium  10 mg  08/26/19 21:00  08/30/19 21:09





  Singulair  PO   10 mg





  QPM NAGA   Administration





     





     





     





     


 


Mupirocin  0 gm  08/22/19 21:40  08/30/19 21:15





  Bactroban 2% Ointment  TOP   1 applic





  PRN PRN   Administration





  Wound Care   





     





     





     


 


Nystatin  0 gm  08/23/19 09:00  08/31/19 09:13





  Mycostatin Powder  TOP   1 applic





  BID NAGA   Administration





     





     





     





     


 


Saccharomyces Boulardii  250 mg  08/23/19 09:00  08/31/19 09:11





  Florastor  PO   250 mg





  QAM NAGA   Administration





     





     





     





     


 


Simethicone  240 mg  08/23/19 09:00  08/31/19 12:22





  Mylicon Chewable  PO   240 mg





  QID NAGA   Administration





     





     





     





     


 


Sodium Chloride  10 ml  08/22/19 21:34  08/27/19 20:59





  Flush - Normal Saline  IVF   10 ml





  PRN PRN   Administration





  Saline Flush   





     





     





     


 


Sodium Chloride  1 drop  08/23/19 09:00  08/31/19 12:23





  Param-128 5% Oph Sol  R EYE   1 drp





  QID NAGA   Administration





     





     





     





     


 


Throat Lozenges  1 ernie  08/24/19 12:33  08/30/19 16:10





  Cepastat Lozenges  PO   1 ernie





  Q2H PRN   Administration





  Sore Throat   





     





     





     














- Exam


General Appearance: awake alert


Eye: anicteric sclera


ENT: normocephalic atraumatic


Neck: symmetric


Heart: RRR


Respiratory: no wheezes, no ronchi, no tachypnea


Respiratory - other findings: fair air entry with few transmitted sound


Gastrointestinal: soft, non-tender, non-distended, normal bowel sounds


Gastrointestinal - other findings: Colostomy noted


Extremities: no edema


Neurological: CN's grossly intact, no new deficit


Psychiatric: A&O x 3





Hosp A/P


(1) Acute respiratory failure with hypoxia


Code(s): J96.01 - ACUTE RESPIRATORY FAILURE WITH HYPOXIA   Status: Suspected   





(2) COPD exacerbation


Code(s): J44.1 - CHRONIC OBSTRUCTIVE PULMONARY DISEASE W (ACUTE) EXACERBATION   

Status: Acute   





(3) Acute bronchitis


Code(s): J20.9 - ACUTE BRONCHITIS, UNSPECIFIED   Status: Acute   





(4) Complicated UTI (urinary tract infection)


Code(s): N39.0 - URINARY TRACT INFECTION, SITE NOT SPECIFIED   Status: Acute   





(5) Colostomy in place


Code(s): Z93.3 - COLOSTOMY STATUS   Status: Chronic   





(6) Functional quadriplegia secondary to MS


Code(s): G35 - MULTIPLE SCLEROSIS; R53.2 - FUNCTIONAL QUADRIPLEGIA   Status: 

Chronic   





(7) Morbid obesity


Code(s): E66.01 - MORBID (SEVERE) OBESITY DUE TO EXCESS CALORIES   Status: 

Chronic   





(8) Multiple sclerosis, secondary progressive


Code(s): G35 - MULTIPLE SCLEROSIS   Status: Chronic   





(9) Neurogenic bladder


Code(s): N31.9 - NEUROMUSCULAR DYSFUNCTION OF BLADDER, UNSPECIFIED   Status: 

Chronic   





(10) Ulcerative colitis


Code(s): K51.90 - ULCERATIVE COLITIS, UNSPECIFIED, WITHOUT COMPLICATIONS   

Status: Chronic   





(11) Asthma exacerbation


Code(s): J45.901 - UNSPECIFIED ASTHMA WITH (ACUTE) EXACERBATION   Status: Acute

   


Qualifiers: 


   Asthma severity: moderate 





(12) Sepsis


Code(s): A41.9 - SEPSIS, UNSPECIFIED ORGANISM   Status: Resolved   





- Plan


Continue bronchodilators, steroid, oxygen, antibiotics and mucolytics.


Continue other supportive care.


Continue low dose metoprolol for tachycardia.


For discharge once cleared by pulmonary

## 2019-09-01 VITALS — DIASTOLIC BLOOD PRESSURE: 76 MMHG | TEMPERATURE: 97.9 F | SYSTOLIC BLOOD PRESSURE: 119 MMHG

## 2019-09-01 LAB
ANION GAP SERPL CALC-SCNC: 14 MMOL/L (ref 10–20)
BUN SERPL-MCNC: 16 MG/DL (ref 9.8–20.1)
CALCIUM SERPL-MCNC: 8.8 MG/DL (ref 7.8–10.44)
CHLORIDE SERPL-SCNC: 106 MMOL/L (ref 98–107)
CO2 SERPL-SCNC: 22 MMOL/L (ref 22–29)
CREAT CL PREDICTED SERPL C-G-VRATE: 251 ML/MIN (ref 70–130)
GLUCOSE SERPL-MCNC: 136 MG/DL (ref 70–105)
HGB BLD-MCNC: 15.1 G/DL (ref 12–16)
MCH RBC QN AUTO: 28.7 PG (ref 27–31)
MCV RBC AUTO: 89.6 FL (ref 78–98)
MDIFF COMPLETE?: YES
PLATELET # BLD AUTO: 404 THOU/UL (ref 130–400)
POTASSIUM SERPL-SCNC: 4.3 MMOL/L (ref 3.5–5.1)
RBC # BLD AUTO: 5.25 MILL/UL (ref 4.2–5.4)
SODIUM SERPL-SCNC: 138 MMOL/L (ref 136–145)
WBC # BLD AUTO: 23.1 THOU/UL (ref 4.8–10.8)

## 2019-09-01 RX ADMIN — ACETYLCYSTEINE SCH MG: 100 SOLUTION ORAL; RESPIRATORY (INHALATION) at 16:44

## 2019-09-01 RX ADMIN — LACTOBACILLUS ACIDOPH-L.BULGARICUS 1 MILLION CELL CHEWABLE TABLET SCH TAB: at 12:37

## 2019-09-01 RX ADMIN — SIMETHICONE SCH MG: 80 TABLET, CHEWABLE ORAL at 15:08

## 2019-09-01 RX ADMIN — SIMETHICONE SCH MG: 80 TABLET, CHEWABLE ORAL at 09:37

## 2019-09-01 RX ADMIN — SODIUM CHLORIDE SCH DRP: 50 SOLUTION/ DROPS OPHTHALMIC at 09:39

## 2019-09-01 RX ADMIN — ACETYLCYSTEINE SCH MG: 100 SOLUTION ORAL; RESPIRATORY (INHALATION) at 01:38

## 2019-09-01 RX ADMIN — BRIMONIDINE TARTRATE SCH DRP: 2 SOLUTION OPHTHALMIC at 09:50

## 2019-09-01 RX ADMIN — SODIUM CHLORIDE SCH DRP: 50 SOLUTION/ DROPS OPHTHALMIC at 15:52

## 2019-09-01 RX ADMIN — ACETYLCYSTEINE SCH MG: 100 SOLUTION ORAL; RESPIRATORY (INHALATION) at 08:24

## 2019-09-01 RX ADMIN — MESALAMINE SCH MG: 1000 SUPPOSITORY RECTAL at 15:07

## 2019-09-01 RX ADMIN — GUAIFENESIN AND DEXTROMETHORPHAN HYDROBROMIDE SCH TAB: 600; 30 TABLET, EXTENDED RELEASE ORAL at 09:49

## 2019-09-01 RX ADMIN — MULTIPLE VITAMINS W/ MINERALS TAB SCH TAB: TAB at 18:23

## 2019-09-01 RX ADMIN — NYSTATIN SCH APPLIC: 100000 POWDER TOPICAL at 10:04

## 2019-09-01 RX ADMIN — CLOTRIMAZOLE SCH APPLIC: 1 CREAM TOPICAL at 09:51

## 2019-09-01 RX ADMIN — MOMETASONE FUROATE AND FORMOTEROL FUMARATE DIHYDRATE SCH PUFF: 200; 5 AEROSOL RESPIRATORY (INHALATION) at 08:37

## 2019-09-01 RX ADMIN — SODIUM CHLORIDE SCH DRP: 50 SOLUTION/ DROPS OPHTHALMIC at 18:25

## 2019-09-01 RX ADMIN — BRIMONIDINE TARTRATE SCH DRP: 2 SOLUTION OPHTHALMIC at 18:22

## 2019-09-01 RX ADMIN — SIMETHICONE SCH MG: 80 TABLET, CHEWABLE ORAL at 18:24

## 2019-09-01 NOTE — PRG
DATE OF SERVICE:  09/01/2019



SUBJECTIVE:  Kathy Flores is scheduled to go home at 6 o'clock.  She would not

have any family at home, but will tomorrow. 



OBJECTIVE:  VITAL SIGNS:  She is afebrile.  Heart rate is 100, respiratory rate 16,

and blood pressure 119/76. 

LUNGS:  Remarkable for a few rhonchi, but she says her cough has been better and she

is using her flutter device, which she feels is helping her. 



IMPRESSION AND PLAN:  Multiple sclerosis with muscle weakness and retained

secretions, clinically stable.  Anticipated she will be discharged within the next

24 hours. 







Job ID:  [id number]

## 2019-11-11 ENCOUNTER — HOSPITAL ENCOUNTER (INPATIENT)
Dept: HOSPITAL 92 - ERS | Age: 59
LOS: 6 days | Discharge: HOME HEALTH | DRG: 871 | End: 2019-11-17
Attending: INTERNAL MEDICINE | Admitting: INTERNAL MEDICINE
Payer: MEDICARE

## 2019-11-11 VITALS — BODY MASS INDEX: 43.7 KG/M2

## 2019-11-11 DIAGNOSIS — Z93.3: ICD-10-CM

## 2019-11-11 DIAGNOSIS — I89.0: ICD-10-CM

## 2019-11-11 DIAGNOSIS — H40.9: ICD-10-CM

## 2019-11-11 DIAGNOSIS — G35: ICD-10-CM

## 2019-11-11 DIAGNOSIS — J44.0: ICD-10-CM

## 2019-11-11 DIAGNOSIS — Z88.1: ICD-10-CM

## 2019-11-11 DIAGNOSIS — E66.01: ICD-10-CM

## 2019-11-11 DIAGNOSIS — G82.20: ICD-10-CM

## 2019-11-11 DIAGNOSIS — J18.9: ICD-10-CM

## 2019-11-11 DIAGNOSIS — K50.90: ICD-10-CM

## 2019-11-11 DIAGNOSIS — H54.40: ICD-10-CM

## 2019-11-11 DIAGNOSIS — N31.9: ICD-10-CM

## 2019-11-11 DIAGNOSIS — I31.3: ICD-10-CM

## 2019-11-11 DIAGNOSIS — B37.49: ICD-10-CM

## 2019-11-11 DIAGNOSIS — F32.9: ICD-10-CM

## 2019-11-11 DIAGNOSIS — Z79.51: ICD-10-CM

## 2019-11-11 DIAGNOSIS — Z74.01: ICD-10-CM

## 2019-11-11 DIAGNOSIS — I73.00: ICD-10-CM

## 2019-11-11 DIAGNOSIS — A41.9: Primary | ICD-10-CM

## 2019-11-11 DIAGNOSIS — Z87.891: ICD-10-CM

## 2019-11-11 DIAGNOSIS — R13.10: ICD-10-CM

## 2019-11-11 DIAGNOSIS — B02.9: ICD-10-CM

## 2019-11-11 DIAGNOSIS — Z66: ICD-10-CM

## 2019-11-11 DIAGNOSIS — F41.9: ICD-10-CM

## 2019-11-11 DIAGNOSIS — Z79.899: ICD-10-CM

## 2019-11-11 DIAGNOSIS — I82.412: ICD-10-CM

## 2019-11-11 DIAGNOSIS — B48.8: ICD-10-CM

## 2019-11-11 DIAGNOSIS — L89.152: ICD-10-CM

## 2019-11-11 LAB
ALBUMIN SERPL BCG-MCNC: 3.5 G/DL (ref 3.5–5)
ALP SERPL-CCNC: 113 U/L (ref 40–110)
ALT SERPL W P-5'-P-CCNC: 67 U/L (ref 8–55)
ANION GAP SERPL CALC-SCNC: 15 MMOL/L (ref 10–20)
AST SERPL-CCNC: 31 U/L (ref 5–34)
BACTERIA UR QL AUTO: (no result) HPF
BASOPHILS # BLD AUTO: 0.1 THOU/UL (ref 0–0.2)
BASOPHILS NFR BLD AUTO: 0.7 % (ref 0–1)
BILIRUB SERPL-MCNC: 1 MG/DL (ref 0.2–1.2)
BUN SERPL-MCNC: 6 MG/DL (ref 9.8–20.1)
CALCIUM SERPL-MCNC: 9.5 MG/DL (ref 7.8–10.44)
CHLORIDE SERPL-SCNC: 104 MMOL/L (ref 98–107)
CO2 SERPL-SCNC: 21 MMOL/L (ref 22–29)
CREAT CL PREDICTED SERPL C-G-VRATE: 0 ML/MIN (ref 70–130)
EOSINOPHIL # BLD AUTO: 0.2 THOU/UL (ref 0–0.7)
EOSINOPHIL NFR BLD AUTO: 1.1 % (ref 0–10)
GLOBULIN SER CALC-MCNC: 2.9 G/DL (ref 2.4–3.5)
GLUCOSE SERPL-MCNC: 120 MG/DL (ref 70–105)
HGB BLD-MCNC: 14.9 G/DL (ref 12–16)
LYMPHOCYTES # BLD: 3.6 THOU/UL (ref 1.2–3.4)
LYMPHOCYTES NFR BLD AUTO: 18.9 % (ref 21–51)
MCH RBC QN AUTO: 29 PG (ref 27–31)
MCV RBC AUTO: 91.1 FL (ref 78–98)
MONOCYTES # BLD AUTO: 2.1 THOU/UL (ref 0.11–0.59)
MONOCYTES NFR BLD AUTO: 11 % (ref 0–10)
NEUTROPHILS # BLD AUTO: 12.8 THOU/UL (ref 1.4–6.5)
NEUTROPHILS NFR BLD AUTO: 68.4 % (ref 42–75)
PLATELET # BLD AUTO: 473 THOU/UL (ref 130–400)
POTASSIUM SERPL-SCNC: 4.1 MMOL/L (ref 3.5–5.1)
PROT UR STRIP.AUTO-MCNC: 100 MG/DL
RBC # BLD AUTO: 5.13 MILL/UL (ref 4.2–5.4)
RBC UR QL AUTO: (no result) HPF (ref 0–3)
SODIUM SERPL-SCNC: 136 MMOL/L (ref 136–145)
WBC # BLD AUTO: 18.7 THOU/UL (ref 4.8–10.8)
WBC UR QL AUTO: (no result) HPF (ref 0–3)
YEAST # UR AUTO: (no result) HPF

## 2019-11-11 PROCEDURE — 76705 ECHO EXAM OF ABDOMEN: CPT

## 2019-11-11 PROCEDURE — 87149 DNA/RNA DIRECT PROBE: CPT

## 2019-11-11 PROCEDURE — 83605 ASSAY OF LACTIC ACID: CPT

## 2019-11-11 PROCEDURE — 83880 ASSAY OF NATRIURETIC PEPTIDE: CPT

## 2019-11-11 PROCEDURE — 86225 DNA ANTIBODY NATIVE: CPT

## 2019-11-11 PROCEDURE — 85025 COMPLETE CBC W/AUTO DIFF WBC: CPT

## 2019-11-11 PROCEDURE — 87086 URINE CULTURE/COLONY COUNT: CPT

## 2019-11-11 PROCEDURE — 84484 ASSAY OF TROPONIN QUANT: CPT

## 2019-11-11 PROCEDURE — 96375 TX/PRO/DX INJ NEW DRUG ADDON: CPT

## 2019-11-11 PROCEDURE — 85027 COMPLETE CBC AUTOMATED: CPT

## 2019-11-11 PROCEDURE — 85049 AUTOMATED PLATELET COUNT: CPT

## 2019-11-11 PROCEDURE — 80053 COMPREHEN METABOLIC PANEL: CPT

## 2019-11-11 PROCEDURE — 96365 THER/PROPH/DIAG IV INF INIT: CPT

## 2019-11-11 PROCEDURE — 94640 AIRWAY INHALATION TREATMENT: CPT

## 2019-11-11 PROCEDURE — 80048 BASIC METABOLIC PNL TOTAL CA: CPT

## 2019-11-11 PROCEDURE — 87205 SMEAR GRAM STAIN: CPT

## 2019-11-11 PROCEDURE — 86038 ANTINUCLEAR ANTIBODIES: CPT

## 2019-11-11 PROCEDURE — 93010 ELECTROCARDIOGRAM REPORT: CPT

## 2019-11-11 PROCEDURE — 93005 ELECTROCARDIOGRAM TRACING: CPT

## 2019-11-11 PROCEDURE — 83036 HEMOGLOBIN GLYCOSYLATED A1C: CPT

## 2019-11-11 PROCEDURE — 81015 MICROSCOPIC EXAM OF URINE: CPT

## 2019-11-11 PROCEDURE — 89220 SPUTUM SPECIMEN COLLECTION: CPT

## 2019-11-11 PROCEDURE — 71045 X-RAY EXAM CHEST 1 VIEW: CPT

## 2019-11-11 PROCEDURE — 85018 HEMOGLOBIN: CPT

## 2019-11-11 PROCEDURE — 87070 CULTURE OTHR SPECIMN AEROBIC: CPT

## 2019-11-11 PROCEDURE — 85007 BL SMEAR W/DIFF WBC COUNT: CPT

## 2019-11-11 PROCEDURE — 93306 TTE W/DOPPLER COMPLETE: CPT

## 2019-11-11 PROCEDURE — 81003 URINALYSIS AUTO W/O SCOPE: CPT

## 2019-11-11 PROCEDURE — 87040 BLOOD CULTURE FOR BACTERIA: CPT

## 2019-11-11 PROCEDURE — 96367 TX/PROPH/DG ADDL SEQ IV INF: CPT

## 2019-11-11 PROCEDURE — 93970 EXTREMITY STUDY: CPT

## 2019-11-11 PROCEDURE — 71275 CT ANGIOGRAPHY CHEST: CPT

## 2019-11-11 PROCEDURE — 85014 HEMATOCRIT: CPT

## 2019-11-11 PROCEDURE — 36415 COLL VENOUS BLD VENIPUNCTURE: CPT

## 2019-11-11 NOTE — RAD
RADIOGRAPH CHEST 1 VIEW:



DATE:

11/11/2019



TIME:

5:23 PM



HISTORY:

59-year-old female. Follow-up infiltrate.



COMPARISON:

8/24/2019



FINDINGS:

There is no interval change in the dense opacity at the left base, totally silhouetting the left phil
diaphragm and cardiac apex, and completely effacing the left lateral costophrenic angle. Review of

prior CT angiogram of the chest of 8/10/2019 demonstrates that this is a pericardial fat pad rather t
johnston a pleural effusion or infiltrate.



In the contralateral right lower lung zone, the previously demonstrated subsegmental atelectasis has 
resolved. The rest of the visualized lung fields are clear. Right lateral costophrenic angle is

sharp. No pulmonary edema or pneumothorax.



IMPRESSION:

1) no evidence of active pulmonary disease.

2) the dense opacity at the left base is a large pericardial fat pad, rather than infiltrate or pleur
al effusion.



Reported By: Onur Yates 

Electronically Signed:  11/11/2019 6:07 PM

## 2019-11-12 LAB
ALBUMIN SERPL BCG-MCNC: 3.6 G/DL (ref 3.5–5)
ALP SERPL-CCNC: 112 U/L (ref 40–110)
ALT SERPL W P-5'-P-CCNC: 63 U/L (ref 8–55)
ANION GAP SERPL CALC-SCNC: 15 MMOL/L (ref 10–20)
AST SERPL-CCNC: 24 U/L (ref 5–34)
BILIRUB SERPL-MCNC: 0.4 MG/DL (ref 0.2–1.2)
BUN SERPL-MCNC: 7 MG/DL (ref 9.8–20.1)
CALCIUM SERPL-MCNC: 9.9 MG/DL (ref 7.8–10.44)
CHLORIDE SERPL-SCNC: 106 MMOL/L (ref 98–107)
CO2 SERPL-SCNC: 20 MMOL/L (ref 22–29)
CREAT CL PREDICTED SERPL C-G-VRATE: 223 ML/MIN (ref 70–130)
GLOBULIN SER CALC-MCNC: 4 G/DL (ref 2.4–3.5)
GLUCOSE SERPL-MCNC: 154 MG/DL (ref 70–105)
HGB BLD-MCNC: 15.1 G/DL (ref 12–16)
MCH RBC QN AUTO: 30.7 PG (ref 27–31)
MCV RBC AUTO: 93 FL (ref 78–98)
PLATELET # BLD AUTO: 448 THOU/UL (ref 130–400)
POTASSIUM SERPL-SCNC: 4 MMOL/L (ref 3.5–5.1)
RBC # BLD AUTO: 4.93 MILL/UL (ref 4.2–5.4)
SODIUM SERPL-SCNC: 137 MMOL/L (ref 136–145)
WBC # BLD AUTO: 13.9 THOU/UL (ref 4.8–10.8)

## 2019-11-12 RX ADMIN — ACETYLCYSTEINE SCH: 100 SOLUTION ORAL; RESPIRATORY (INHALATION) at 22:18

## 2019-11-12 RX ADMIN — ACETYLCYSTEINE SCH: 100 SOLUTION ORAL; RESPIRATORY (INHALATION) at 16:01

## 2019-11-12 RX ADMIN — LACTOBACILLUS ACIDOPH-L.BULGARICUS 1 MILLION CELL CHEWABLE TABLET SCH TAB: at 12:32

## 2019-11-12 RX ADMIN — Medication SCH ML: at 21:52

## 2019-11-12 RX ADMIN — Medication SCH ML: at 09:39

## 2019-11-12 RX ADMIN — HEPARIN SODIUM SCH UNITS: 5000 INJECTION, SOLUTION INTRAVENOUS; SUBCUTANEOUS at 09:39

## 2019-11-12 RX ADMIN — BRIMONIDINE TARTRATE SCH DROP: 2 SOLUTION OPHTHALMIC at 21:52

## 2019-11-12 RX ADMIN — HEPARIN SODIUM SCH UNITS: 5000 INJECTION, SOLUTION INTRAVENOUS; SUBCUTANEOUS at 16:00

## 2019-11-12 RX ADMIN — BRIMONIDINE TARTRATE SCH DROP: 2 SOLUTION OPHTHALMIC at 16:01

## 2019-11-12 RX ADMIN — AZITHROMYCIN SCH MLS: 500 INJECTION, POWDER, LYOPHILIZED, FOR SOLUTION INTRAVENOUS at 12:31

## 2019-11-12 RX ADMIN — BRIMONIDINE TARTRATE SCH DROP: 2 SOLUTION OPHTHALMIC at 11:07

## 2019-11-12 RX ADMIN — MOMETASONE FUROATE AND FORMOTEROL FUMARATE DIHYDRATE SCH PUFF: 100; 5 AEROSOL RESPIRATORY (INHALATION) at 18:58

## 2019-11-12 NOTE — CT
CT arteriogram chest with IV contrast and 3-D imaging



HISTORY: Cough. Dyspnea.



COMPARISON: 8/10/2019.



FINDINGS: No filling defects are apparent within the central pulmonary arteries. Motion artifact and 
photon deficiency due to soft tissue attenuation limits evaluation of the more peripheral pulmonary

arteries. Normal branching great vessels at the aortic arch. Mild atelectasis at each lung base. No e
vidence of pneumothorax or mediastinal adenopathy. Small cyst within the dome of the liver. Old

bilateral rib fractures. Partial compression of the T8 superior endplate is stable.











IMPRESSION: No CT evidence of pulmonary embolus.



Mild bibasilar lung atelectasis.



Chronic-type findings are stable.



Reported By: HAFSA Cox 

Electronically Signed:  11/12/2019 5:22 PM

## 2019-11-12 NOTE — ULT
EXAM:

Bilateral lower extremity venous Doppler



HISTORY:

Lower extremity lymphedema. Purple skin color on right.



FINDINGS:

Grayscale, color-flow, Doppler evaluation, spectral analysis of the bilateral lower extremities venou
s structures is performed with 2-D imaging. The bilateral common femoral, superficial femoral,

popliteal, posterior tibial, proximal greater saphenous and profunda femoral veins are imaged.



There is decreased lumen compressibility with increased luminal echogenicity seen involving the right
 common femoral, profunda femoral, superficial femoral, and popliteal veins. No flow is present

within the right common femoral or profunda femoral veins suggesting occlusive DVT. Significantly dim
inished flow is seen within the right superficial femoral vein and popliteal vein. Normal flow is

seen in the visualized right posterior tibial vein



There is normal luminal compressibility, flow, and augmentation in the visualized deep venous structu
res of the left lower extremity.



IMPRESSION:



1. Occlusive DVT involving the right common femoral and profunda femoral veins with nonocclusive DVT 
involving the right lower extremity superficial femoral and popliteal veins.

2. No evidence of a DVT involving the visualized deep venous structures left lower extremity.

3. Above findings were discussed with Gertrude, nurse in charge of the patient's hospital care on the
 hospital floor by Christina ultrasonographer on 11/12/2019 at 1426 hours.



Reported By: Anand Ashby 

Electronically Signed:  11/12/2019 2:43 PM

## 2019-11-12 NOTE — ULT
US Gallbladder RUQ: 11/12/2019 11:25 AM



CLINICAL HISTORY: Elevated LFTs with nausea.



STUDY: Limited right upper quadrant ultrasound of abdomen.



COMPARISON: 7/5/2015                  



FINDINGS:



Liver:

Size: Normal. 

Echogenicity: Normal.

Contour: Smooth.

Mass: None.



Bile ducts: No intrahepatic or extrahepatic biliary dilatation.  Common bile duct measures  5 mm. 



Gallbladder: Normal.



Pancreas:  Head, body, and tail appear normal.



Right kidney: No pelvicalyceal dilatation. Right kidney measuring 12.5 cm in length.



IMPRESSION:



Unremarkable exam.



Reported By: Bill Garcia 

Electronically Signed:  11/12/2019 2:44 PM

## 2019-11-12 NOTE — PDOC.HHP
Hospitalist HPI





- History of Present Illness


shortness of breath/cough


History of Present Illness: 





This is a 59 year old female with history of MS, recurrent shingles, COPD, 

neuropathic pain who presented to the ER with increasing shortness of breath 

and cough. The patient states that on Saturday she started developing a dry 

cough. It became excessive on . On Monday she couldn't tolerate it and 

called her home health nurse and respiratory therapist who suggested she go to 

the ER due to increased work of breathing. The patient reports increasing 

shortness of breath as well. She is unable to produce any phlegm. Has only mild 

congestion in her chest. She complains of chills and feeling warm and says her 

normal temperature is 97, but was 98.6 in the ER. She has mild runny nose, no 

sore throat. She denies sick contacts.  She also complains of chest pressure in 

the lower parts of her chest, but denies diaphoresis, palpitations, dizziness 

or lightheadedness. She is a former smoker. She says she was recently diagnosed 

with COPD during her last admission and finished a course of prednisone . The 

patient denies abdominal pain but complains of nausea. She did not vomit. She 

denies constipation or diarrhea.  She has a chronic rothman catheter for 

neurogenic bladder and it was last changed a month ago. She usually gets it 

changed once a month. 





ED Course: 





The patient had labs in ER  which showed WBC of 18, an EKG which showed sinus 

tachycardia with short MS with premature atrial complexes and nonspecific T 

wave abnormality. Chest X ray shows no acute disease. THe patient had a UA 

which showed large blood, bilirubin, positive nitrite.  She was given IV 

levaquin and ceftriaxone in the ER. Since coming up to the floor the patient 

feels much better in terms of her shortness of breath and chills. 











Hospitalist ROS





- Review of Systems


Constitutional: reports: fever, chills


ENT: denies: ear pain, ear discharge


Respiratory: reports: cough, dry, shortness of breath


Cardiovascular: reports: chest pain


Gastrointestinal: reports: nausea.  denies: vomiting, abdominal pain, diarrhea, 

constipation


Genitourinary: reports: retention.  denies: dysuria, frequency


Skin: reports: other (had recent shingles rash which is healing)


Neurological: reports: other (paraplegic in lower extremities)





- Medication


Medications: 


Active Medications











Generic Name Dose Route Start Last Admin





  Trade Name Freq  PRN Reason Stop Dose Admin


 


Albuterol/Ipratropium  3 ml  19 22:30  19 10:38





  Duoneb  NEB   3 ml





  [id number] NAGA   Administration





     





     





     





     


 


Calcium Carbonate  500 mg  19 08:00  19 09:39





  Oscal-500  PO   500 mg





  BID-WM NAGA   Administration





     





     





     





     


 


Cholecalciferol  5,000 units  19 09:00  19 09:38





  Vitamin D3  PO   5,000 units





  DAILY NAGA   Administration





     





     





     





     


 


Cyclobenzaprine HCl  10 mg  19 09:00  19 09:38





  Flexeril  PO   10 mg





  TID NAGA   Administration





     





     





     





     


 


Fluoxetine HCl  20 mg  19 09:00  19 09:39





  Prozac  PO   20 mg





  BID NAGA   Administration





     





     





     





     


 


Gabapentin  800 mg  19 09:00  19 09:38





  Neurontin  PO   800 mg





  QID NAGA   Administration





     





     





     





     


 


Heparin Sodium (Porcine)  5,000 units  19 09:00  19 09:39





  Heparin  SC   5,000 units





  TID NAGA   Administration





     





     





     





     


 


Sodium Chloride  10 ml  19 09:00  19 09:39





  Flush - Normal Saline  IVF   10 ml





  Q12HR NAGA   Administration





     





     





     





     














Hospitalist History





- Past Medical History


Pulmonary: reports: COPD


Gastrointestinal: reports: Other (ulcerative colitis and Crohn's disease s/p 

colectomy)


Heme/Onc: reports: no pertinent history


Psych: reports: Depression


Musculoskeletal: reports: no pertinent history (paraplegic)


Infectious Disease: reports: Herpes zoster (had seven attacks in the past year)


ENT: denies: no pertinent history


Renal/: reports: Other (neurogenic bladder)





- Past Surgical History


Past Surgical History: reports: Tubal Ligation


Other Surgical History: 





Ganglion cyst removal


Colectomy 





- Family History


Other Family History: 





Dad had heart disease and possibly had MS.  Father had emphysema and is 


Older brother has MS





- Social History


Smoking Status: Former smoker


Alcohol: reports: None


Drugs: reports: none


Living Situation: Alone


Domestic Violence: Negative


Activity level: bed bound


Other Social History: 





Smoked for sixteen years, quit in . 





- Exam


General Appearance: NAD, awake alert


Eye: PERRL, anicteric sclera


ENT: normocephalic atraumatic, no oropharyngeal lesions


Neck: supple, symmetric, no JVD, no thyromegaly


Heart: RRR, no murmur, no gallops, no rubs


Respiratory: CTAB, no wheezes, no ronchi


Respiratory - other findings: LLQ rales 


Gastrointestinal: soft, non-tender, non-distended


Gastrointestinal - other findings: Colostomy LLQ 


Extremities: no cyanosis, no clubbing, no edema


Skin: normal turgor, no lesions, no rashes


Skin - other findings: no shingles rash noted. Some dry skin on gluteal area. 

Purple discoloration


Neurological: cranial nerve grossly intact, normal sensation to touch, no focal 

deficits, no new deficit


Neurological - other findings: patient paraplegic in lower legs


Musculoskeletal - other findings: can move her arms but not her legs. RLE more 

swollen than left. 


Psychiatric: normal affect, normal behavior, A&O x 3





Hospitalist Results





- Labs


Result Diagrams: 


 19 07:40





 19 07:40


Lab results: 


 











WBC  13.9 thou/uL (4.8-10.8)  H  19  07:40    


 


Hgb  15.1 g/dL (12.0-16.0)   19  07:40    


 


Hct  45.8 % (36.0-47.0)   19  07:40    


 


MCV  93.0 fL (78.0-98.0)   19  07:40    


 


Plt Count  448 thou/uL (130-400)  H  19  07:40    


 


Neutrophils %  68.4 % (42.0-75.0)   19  17:14    


 


Sodium  137 mmol/L (136-145)   19  07:40    


 


Potassium  4.0 mmol/L (3.5-5.1)   19  07:40    


 


Chloride  106 mmol/L ()   19  07:40    


 


Carbon Dioxide  20 mmol/L (22-29)  L  19  07:40    


 


BUN  7 mg/dL (9.8-20.1)  L  19  07:40    


 


Creatinine  0.60 mg/dL (0.6-1.1)   19  07:40    


 


Glucose  154 mg/dL ()  H  19  07:40    


 


Lactic Acid  1.0 mmol/L (0.5-2.2)   19  17:14    


 


Calcium  9.9 mg/dL (7.8-10.44)   19  07:40    


 


Total Bilirubin  0.4 mg/dL (0.2-1.2)   19  07:40    


 


AST  24 U/L (5-34)   19  07:40    


 


ALT  63 U/L (8-55)  H  19  07:40    


 


Alkaline Phosphatase  112 U/L ()  H  19  07:40    


 


Troponin I  Less than  0.010 ng/mL (< 0.028)   19  17:14    


 


B-Natriuretic Peptide  20.1 pg/mL (0-100)   19  17:14    


 


Serum Total Protein  7.6 g/dL (6.0-8.3)   19  07:40    


 


Albumin  3.6 g/dL (3.5-5.0)   19  07:40    


 


Urine Ketones  > or equal to 80 mg/dL (Negative)  A  19  18:09    


 


Urine Blood  Large  (Negative)  A  19  18:09    


 


Urine Nitrite  Positive  (Negative)  A  19  18:09    


 


Ur Leukocyte Esterase  Moderate  (Negative)  H  19  18:09    


 


Urine RBC  7-10 HPF (0-3)  A  19  18:09    


 


Urine WBC  Greater Than 50 HPF (0-3)  A  19  18:09    


 


Ur Squamous Epith Cells  4-6 HPF (0-3)  A  19  18:09    


 


Urine Bacteria  3+ HPF (None Seen)  A  19  18:09    














- EKG Interpretation


EKG: 








The patient had an EKG which showed sinus tachycardia with short MS with 

premature atrial complexes and nonspecific T wave abnormality.





- Radiology Interpretation


  ** Chest x-ray


Status: image reviewed by me


Additional Comment: 





no acute disease 





Hospitalist H&P A/P





- Plan


Plan: 


 Chest X ray: large pericardial fat pad at left base 





This is a 59 year old female with past medical history of MS, COPD, neurogenic 

bladder with chronic rothman, ulcerative colitis with colostomy who presented 

with shortness of breath and cough, found to be tachycardic with WBC of 18, s/p 

IV levaquin and ceftriaxone in the ER 








Sepsis secondary to possible pneumonia versus UTI


- patient had low grade temp, tachycardia, WBC of 18. S/p IV levaquin and 

ceftriaxone with improvement


- will continue ceftriaxone and add azithromycin. Chest X ray normal but given 

clinical picture will treat empirically for pneumonia


- blood cultures negative, will send sputum sample








COPD


- continue duoneb q4 and albuterol prn


- will hold off on steroid for now unless significant wheezing


- continue advair and singulair 








Chronic lymphedema


- currently right leg appears more swollen than left, will check dopplers


-purple discoloration on right due to Raynaud's per patient. Check JUAN ANTONIO in am 








Nausea/Elevated ALP 


-  Will check RUQ ultrasound








Ulcerative colitis/Crohn's s/p colectomy


- hold mesalamine for now








MS- continue baclofen and gabapentin








Recurrent Shingles


- continue valtrex








Depression -  fluoxetine 








Glaucoma - continue eye drops 











DVT prophylaxis: heparin SC 


Code status: DNR/DNI

## 2019-11-13 LAB
ANA SYMPHONY (QUANTITATIVE): 0.1 RATIO
ANION GAP SERPL CALC-SCNC: 14 MMOL/L (ref 10–20)
BUN SERPL-MCNC: 11 MG/DL (ref 9.8–20.1)
CALCIUM SERPL-MCNC: 10.2 MG/DL (ref 7.8–10.44)
CHLORIDE SERPL-SCNC: 105 MMOL/L (ref 98–107)
CO2 SERPL-SCNC: 22 MMOL/L (ref 22–29)
CREAT CL PREDICTED SERPL C-G-VRATE: 206 ML/MIN (ref 70–130)
DSDNA IGG SERPL IA-ACNC: 0.5 IU/ML
DSDNA INTERPRETATION: (no result)
GLUCOSE SERPL-MCNC: 130 MG/DL (ref 70–105)
HGB BLD-MCNC: 14 G/DL (ref 12–16)
MCH RBC QN AUTO: 29.4 PG (ref 27–31)
MCV RBC AUTO: 92.1 FL (ref 78–98)
MDIFF COMPLETE?: YES
PLATELET # BLD AUTO: 555 THOU/UL (ref 130–400)
POTASSIUM SERPL-SCNC: 3.6 MMOL/L (ref 3.5–5.1)
RBC # BLD AUTO: 4.76 MILL/UL (ref 4.2–5.4)
SODIUM SERPL-SCNC: 137 MMOL/L (ref 136–145)
WBC # BLD AUTO: 20.9 THOU/UL (ref 4.8–10.8)

## 2019-11-13 RX ADMIN — AZITHROMYCIN SCH MLS: 500 INJECTION, POWDER, LYOPHILIZED, FOR SOLUTION INTRAVENOUS at 12:28

## 2019-11-13 RX ADMIN — Medication SCH ML: at 09:00

## 2019-11-13 RX ADMIN — ACETYLCYSTEINE SCH: 100 SOLUTION ORAL; RESPIRATORY (INHALATION) at 14:28

## 2019-11-13 RX ADMIN — LACTOBACILLUS ACIDOPH-L.BULGARICUS 1 MILLION CELL CHEWABLE TABLET SCH TAB: at 12:28

## 2019-11-13 RX ADMIN — BRIMONIDINE TARTRATE SCH DROP: 2 SOLUTION OPHTHALMIC at 09:00

## 2019-11-13 RX ADMIN — Medication SCH ML: at 22:27

## 2019-11-13 RX ADMIN — VANCOMYCIN HYDROCHLORIDE SCH MLS: 1 INJECTION, SOLUTION INTRAVENOUS at 14:40

## 2019-11-13 RX ADMIN — ACETYLCYSTEINE SCH: 100 SOLUTION ORAL; RESPIRATORY (INHALATION) at 07:41

## 2019-11-13 RX ADMIN — BRIMONIDINE TARTRATE SCH DROP: 2 SOLUTION OPHTHALMIC at 22:23

## 2019-11-13 RX ADMIN — MOMETASONE FUROATE AND FORMOTEROL FUMARATE DIHYDRATE SCH PUFF: 100; 5 AEROSOL RESPIRATORY (INHALATION) at 18:59

## 2019-11-13 RX ADMIN — BRIMONIDINE TARTRATE SCH DROP: 2 SOLUTION OPHTHALMIC at 14:49

## 2019-11-13 RX ADMIN — MOMETASONE FUROATE AND FORMOTEROL FUMARATE DIHYDRATE SCH PUFF: 100; 5 AEROSOL RESPIRATORY (INHALATION) at 07:46

## 2019-11-13 NOTE — CON
DATE OF CONSULTATION:  



HISTORY OF PRESENT ILLNESS:  Ms. Flores is a 59-year-old female, who was found to

have a DVT.  She has multiple sclerosis, has been bedridden for over 10 years.  CT

pulmonary angiogram shows no evidence of thromboembolic disease. 



She does not have any pulmonary infiltrate suggestive of pneumonia. 



I was consulted by Dr. Olsen because of presence of DVT.  He was consulted for

filter and does not recommend a filter. 



I have seen her multiple times in the past.



PAST MEDICAL HISTORY:  Remarkable for:

1. Multiple sclerosis.

2. Life-threatening obesity.  She says she has lost 60 pounds on her current diet.

3. History of coronary artery disease.

4. Chronic stasis changes.

5. Raynaud's.

6. History of an ostomy in the past.  I met her in 2014 after an abdominal

procedure, where she was left overnight ventilated because of her multiple

comorbidities. 

7. History of asthma.

8. History of depression.

9. History of wrist surgery.

10. History of tubal ligation.



FAMILY HISTORY:  Negative for lung disease in early age.



REVIEW OF SYSTEMS:  Remarkable only for mild dyspnea.  She is a former smoker.  Does

not drink. 



MEDICATIONS:  Reviewed.



PHYSICAL EXAMINATION:

GENERAL:  She is in no distress.  I last saw her in August.  In my opinion, she

looks better than she looked back then. 

VITAL SIGNS:  She is afebrile.  Heart rate is 120, respiratory rate is 18, oximetry

is 97% on room air, and blood pressure 141/81. 

HEAD AND NECK:  Unremarkable. 

LUNGS:  Clear. 

HEART:  Regular rhythm. 

ABDOMEN:  Soft and nontender. 

EXTREMITIES:  Without edema.



LABORATORY DATA:  White count 20.9, hemoglobin 14, and platelets 555.  Electrolytes

are normal.  Renal functions normal.  ALT 63, AST 24, and alkaline phosphatase 112. 



IMPRESSION:  Deep venous thrombosis.  I would recommend full-dose oral

anticoagulants for now and with loading dose and then switch to standard dose

anticoagulation for 6 months.  At 6 months, I would recommend switching her to

prophylactic dose Xarelto or Eliquis and probably continue this for life given that

she will chronically be bedridden.  Hopefully, this will decrease chance of a future

thromboembolic event.  I will see her as needed in the future.  She has a doctor who

calls on her at her house who set her up with a percussion vest and some type of

noninvasive ventilation.  I have explained to her that getting her weight off is the

most important issue at hand and that her dyspnea is at least in a significant part

related to her size and her deconditioning that is a result of being bedridden for

approximately 10 years.  She said she understands. 







Job ID:  [id number]

## 2019-11-13 NOTE — CON
DATE OF CONSULTATION:  



HISTORY OF PRESENT ILLNESS:  Kathy Flores is a 59-year-old female with couple

of prior admissions this summer for dyspnea and again admitted with increasing

shortness of breath as well as a nonproductive cough.  She was found to have some

swelling right greater than left in her lower extremities and an ultrasound

demonstrated DVT of the right femoral vein.  CT angio of the chest demonstrated no

evidence of pulmonary embolus.  Similarly, there was some atelectatic changes in the

left base, but no obvious pneumonia. 



PAST MEDICAL HISTORY:  As noted includes multiple sclerosis and she has morbid

obesity.  As a result of those 2 issues, she has basically been nonambulatory for

the past decade.  She lives alone, although she has a couple of renters that are

there and she has home health assistance.  The patient reports Raynaud's. 



SOCIAL HISTORY:  Otherwise, she has not smoked in many years at least approaching 20

years now. 



PHYSICAL EXAMINATION:

GENERAL:  On examination, she is alert and cooperative lady, currently receiving a

nebulizer treatment.  She is in no respiratory distress. 

VITAL SIGNS:  She has some mild resting tachycardia of about 110.  Blood pressures

recorded 130/70. 

NECK:  No carotid bruits. 

LUNGS:  Clear to auscultation anteriorly. 

CARDIAC:  Reveals no murmurs, and she does have the noted resting tachycardia. 

EXTREMITIES:  She has some bluish skin discoloration of the right greater than left

foot and ankle area.  She has no edema at this time that is palpable.  I am unable

to palpate pedal pulses, but she has excellent Doppler in her left DP, right PT,

right DP. 



PLAN:  At this time, the patient is on Lovenox, anticoagulation and this can be

transitioned to oral drugs and I think that is the best initial approach.  I see no

indication at the present time for an IVC filter and have discussed these findings

and recommendations with the patient. 





Job ID:  [id number]

## 2019-11-13 NOTE — PDOC.HOSPP
- Subjective


Encounter Date: 11/13/19


Encounter Time: 01:40


Subjective: 





The patient is still having coughing fits. She says it starts out as a dry 

cough and then turns into a wet cough. She also says that she has trouble 

swallowing bread and certain foods trigger her coughing spells and make her 

choke. She has had barium swallow in the past, but not recently. She has not 

had an EGD before. She denies vomiting.   She denies chest pain. Shortness of 

breath is similar. 





Per RT, she is refusing mucomyst





Regarding tachycardia, patient follows with Dr. Castaneda who did an ECHO on her 

in September and told her that her heart was in great shape. 








Patient says she was told by Dr. Angeles to lose weight. SHe is interested in 

trying a low carbohydrate diet and would like to talk to dietary. She is not 

interested in bariatric surgery. She also wants dietary recommendations with 

regards to what vegetables to eat for her ulcerative colitis considering 

broccoli makes her stool very formed in her colostomy. 








- Objective


Vital Signs & Weight: 


 Vital Signs (12 hours)











  Temp Pulse Resp BP Pulse Ox


 


 11/13/19 12:57  97.7 F  121 H  16  127/75  96


 


 11/13/19 10:53   123 H  20  


 


 11/13/19 08:58   121 H   


 


 11/13/19 08:05  97.8 F  121 H  18  141/81 H  97


 


 11/13/19 07:42   112 H  20   98


 


 11/13/19 03:00  98 F  118 H  16  130/73  93 L








 Weight











Weight                         305 lb 3.2 oz














I&O: 


 











 11/12/19 11/13/19 11/14/19





 06:59 06:59 06:59


 


Intake Total 120 550 


 


Output Total 975 1250 


 


Balance -855 700 











Result Diagrams: 


 11/13/19 06:22





 11/13/19 06:22





Hospitalist ROS





- Review of Systems


Constitutional: denies: fever, chills


Eyes: denies: vision change


Respiratory: reports: cough


Cardiovascular: denies: chest pain, palpitations


Gastrointestinal: denies: nausea, vomiting, abdominal pain, diarrhea, 

constipation





- Medication


Medications: 


Active Medications











Generic Name Dose Route Start Last Admin





  Trade Name Freq  PRN Reason Stop Dose Admin


 


Acetylcysteine  300 mg  11/12/19 14:00  11/13/19 07:41





  Mucomyst 10% (Oral Or Inh)  NEB   Not Given





  Q8HR NAGA   





     





     





     





     


 


Acidophilus  1 tab  11/12/19 12:00  11/13/19 12:28





  Floranex  PO   1 tab





  1200 NAGA   Administration





     





     





     





     


 


Brimonidine Tartrate  1 drop  11/12/19 09:00  11/13/19 09:00





  Alphagan 0.2% Ophth Soln  R EYE   1 drop





  TID NAGA   Administration





     





     





     





     


 


Calcium Carbonate  500 mg  11/12/19 08:00  11/13/19 08:59





  Oscal-500  PO   500 mg





  BID-WM NAGA   Administration





     





     





     





     


 


Cholecalciferol  5,000 units  11/12/19 09:00  11/13/19 08:58





  Vitamin D3  PO   5,000 units





  DAILY NAGA   Administration





     





     





     





     


 


Cyclobenzaprine HCl  10 mg  11/12/19 09:00  11/13/19 08:59





  Flexeril  PO   10 mg





  TID NAGA   Administration





     





     





     





     


 


Diltiazem HCl  120 mg  11/13/19 09:00  11/13/19 08:58





  Cardizem Cd  PO   120 mg





  DAILY NAGA   Administration





     





     





     





     


 


Docusate Sodium  100 mg  11/12/19 21:00  11/12/19 21:43





  Colace  PO   100 mg





  HS NAGA   Administration





     





     





     





     


 


Enoxaparin Sodium  140 mg  11/12/19 21:00  11/13/19 09:10





  Lovenox  SC   140 mg





  0900,2100 NAGA   Administration





     





     





     





     


 


Fluoxetine HCl  20 mg  11/12/19 09:00  11/13/19 08:59





  Prozac  PO   20 mg





  BID NAGA   Administration





     





     





     





     


 


Gabapentin  800 mg  11/12/19 09:00  11/13/19 12:28





  Neurontin  PO   800 mg





  QID NAGA   Administration





     





     





     





     


 


Guaifenesin  600 mg  11/12/19 21:00  11/13/19 08:59





  Mucinex  PO   600 mg





  Q12HR NAGA   Administration





     





     





     





     


 


Hyoscyamine Sulfate  0.25 mg  11/12/19 09:00  11/13/19 12:28





  Levsin  PO   0.25 mg





  QID NAGA   Administration





     





     





     





     


 


Latanoprost  1 drop  11/12/19 21:00  11/12/19 21:51





  Xalatan 0.005% Ophth Soln  R EYE   1 drop





  HS NAGA   Administration





     





     





     





     


 


Mometasone Furoate/Formoterol Fumar  2 puff  11/12/19 18:30  11/13/19 07:46





  Dulera 100 Mcg/5 Mcg Inhaler  INH   2 puff





  BID-RT NAGA   Administration





     





     





     





     


 


Montelukast Sodium  10 mg  11/12/19 21:00  11/12/19 21:42





  Singulair  PO   10 mg





  QPM NAGA   Administration





     





     





     





     


 


Saccharomyces Boulardii  250 mg  11/13/19 09:00  11/13/19 08:58





  Florastor  PO   250 mg





  DAILY NAGA   Administration





     





     





     





     


 


Sodium Chloride  10 ml  11/12/19 09:00  11/13/19 09:00





  Flush - Normal Saline  IVF   10 ml





  Q12HR NAGA   Administration





     





     





     





     


 


Valacyclovir HCl  1,000 mg  11/12/19 21:00  11/13/19 08:59





  Valtrex  PO   1,000 mg





  BID NAGA   Administration





     





     





     





     














- Exam


General Appearance: NAD, awake alert


General - other findings: tachycardic. Moderate distress


Eye: PERRL, anicteric sclera


ENT: normocephalic atraumatic, no oropharyngeal lesions


Neck: supple, symmetric, no JVD


Heart: RRR, no murmur, no gallops, no rubs


Respiratory - other findings: Rhonchi at right lung base 


Gastrointestinal: soft, non-tender, non-distended, normal bowel sounds


Extremities: no cyanosis, no clubbing, no edema


Skin: normal turgor, no lesions


Skin - other findings: Right foot purple and cold, diminished pulses. Left foot 

cold, no pulses 


Neurological: cranial nerve grossly intact, normal sensation to touch, no focal 

deficits, no new deficit


Musculoskeletal: normal tone, normal strength, no muscle wasting


Psychiatric: normal affect, normal behavior, A&O x 3, oriented to person





Hosp A/P





- Plan


Venogram: occlusive DVT involving right common femoral and profunda femoral 

veins with nonocclusive DVT involving RLE superficial femoral and popliteal 

veins. 


Abd Ultrasound: unremarkable


CTA thorax: bibasilar lung atelectasis, no PE  





This is a 59 year old female with past medical history of MS, COPD, neurogenic 

bladder with chronic rothman, ulcerative colitis with colostomy who presented 

with shortness of breath and cough, found to be tachycardic with WBC of 18, s/p 

IV levaquin and ceftriaxone in the ER 








Sepsis secondary to possible pneumonia versus UTI


- patient had low grade temp, tachycardia, WBC of 18. S/p IV levaquin and 

ceftriaxone with improvement. Chest  Xray and CTA show no pneumonia, however 

WBC has increased to 20. Will switch to vancomycin and zosyn given that patient 

is still tachycardic


- blood cultures show 1/2 staph, will repeat blood cultures


- sputum culture pending











#Right common femoral DVT


#Chronic lymphedema


- started on empiric lovenox 1 mg SC BID


- skin color in lower leg not purple today, however right foot still cold and 

purple with no pulse. Patient reports she has Raynauds . JUAN ANTONIO pending


- will transition to oral when tachycardia improves








Dysphagia


- speech consult


- consider barium swallow





Morbid obesity


- dietary nutrition consult. Switch diet to 60 gm carbohydrate diet








COPD


- continue duoneb q4 and albuterol prn


- will hold off on steroid for now unless significant wheezing


- continue advair and singulair 











Nausea/Elevated ALP 


-  RUQ ultrasound normal 








Ulcerative colitis/Crohn's s/p colectomy


- hold mesalamine for now








MS with paraplegia- continue baclofen and gabapentin. PT consult 








Recurrent Shingles


- continue valtrex








Depression -  fluoxetine 








Glaucoma - continue eye drops 











Code status: DNR/DNI

## 2019-11-14 LAB
ALBUMIN SERPL BCG-MCNC: 3.3 G/DL (ref 3.5–5)
ALP SERPL-CCNC: 91 U/L (ref 40–110)
ALT SERPL W P-5'-P-CCNC: 140 U/L (ref 8–55)
ANION GAP SERPL CALC-SCNC: 11 MMOL/L (ref 10–20)
AST SERPL-CCNC: 51 U/L (ref 5–34)
BILIRUB SERPL-MCNC: 0.4 MG/DL (ref 0.2–1.2)
BUN SERPL-MCNC: 13 MG/DL (ref 9.8–20.1)
CALCIUM SERPL-MCNC: 9.8 MG/DL (ref 7.8–10.44)
CHLORIDE SERPL-SCNC: 107 MMOL/L (ref 98–107)
CO2 SERPL-SCNC: 25 MMOL/L (ref 22–29)
CREAT CL PREDICTED SERPL C-G-VRATE: 206 ML/MIN (ref 70–130)
GLOBULIN SER CALC-MCNC: 3.3 G/DL (ref 2.4–3.5)
GLUCOSE SERPL-MCNC: 117 MG/DL (ref 70–105)
HGB BLD-MCNC: 12.8 G/DL (ref 12–16)
MCH RBC QN AUTO: 28.6 PG (ref 27–31)
MCV RBC AUTO: 91.3 FL (ref 78–98)
PLATELET # BLD AUTO: 495 THOU/UL (ref 130–400)
POTASSIUM SERPL-SCNC: 3.3 MMOL/L (ref 3.5–5.1)
RBC # BLD AUTO: 4.49 MILL/UL (ref 4.2–5.4)
SODIUM SERPL-SCNC: 140 MMOL/L (ref 136–145)
WBC # BLD AUTO: 14.8 THOU/UL (ref 4.8–10.8)

## 2019-11-14 RX ADMIN — MOMETASONE FUROATE AND FORMOTEROL FUMARATE DIHYDRATE SCH PUFF: 100; 5 AEROSOL RESPIRATORY (INHALATION) at 07:15

## 2019-11-14 RX ADMIN — Medication SCH ML: at 21:09

## 2019-11-14 RX ADMIN — BRIMONIDINE TARTRATE SCH DROP: 2 SOLUTION OPHTHALMIC at 08:47

## 2019-11-14 RX ADMIN — VANCOMYCIN HYDROCHLORIDE SCH MLS: 1 INJECTION, SOLUTION INTRAVENOUS at 16:02

## 2019-11-14 RX ADMIN — LACTOBACILLUS ACIDOPH-L.BULGARICUS 1 MILLION CELL CHEWABLE TABLET SCH TAB: at 12:14

## 2019-11-14 RX ADMIN — MOMETASONE FUROATE AND FORMOTEROL FUMARATE DIHYDRATE SCH: 100; 5 AEROSOL RESPIRATORY (INHALATION) at 18:48

## 2019-11-14 RX ADMIN — PANTOPRAZOLE SODIUM SCH MG: 40 GRANULE, DELAYED RELEASE ORAL at 08:52

## 2019-11-14 RX ADMIN — ACETYLCYSTEINE SCH: 100 SOLUTION ORAL; RESPIRATORY (INHALATION) at 09:14

## 2019-11-14 RX ADMIN — ACETYLCYSTEINE SCH: 100 SOLUTION ORAL; RESPIRATORY (INHALATION) at 14:43

## 2019-11-14 RX ADMIN — BRIMONIDINE TARTRATE SCH DROP: 2 SOLUTION OPHTHALMIC at 16:01

## 2019-11-14 RX ADMIN — VANCOMYCIN HYDROCHLORIDE SCH MLS: 1 INJECTION, SOLUTION INTRAVENOUS at 03:58

## 2019-11-14 RX ADMIN — Medication SCH ML: at 08:59

## 2019-11-14 RX ADMIN — BRIMONIDINE TARTRATE SCH DROP: 2 SOLUTION OPHTHALMIC at 21:07

## 2019-11-14 RX ADMIN — ACETYLCYSTEINE SCH: 100 SOLUTION ORAL; RESPIRATORY (INHALATION) at 09:15

## 2019-11-14 NOTE — PDOC.HOSPP
- Subjective


Encounter Date: 11/14/19


Encounter Time: 17:55


Subjective: 





The patient is doing better. Still has a cough that's deep and is starting to 

use an ENT suction to help get stuff out. She was told by speech to have a 

mechanical ground soft diet. Says she got helpful information from the 

nutritionist. 





She denies chest pain. 








- Objective


Vital Signs & Weight: 


 Vital Signs (12 hours)











  Temp Pulse Pulse Pulse Resp BP BP


 


 11/14/19 15:56  98.2 F  111 H    20  


 


 11/14/19 14:44   113 H    20  


 


 11/14/19 11:41    114 H  115 H   120/83  128/91 H


 


 11/14/19 11:20  98.4 F  116 H    20  


 


 11/14/19 10:38   122 H    20  


 


 11/14/19 08:44  97.6 F  109 H    16  


 


 11/14/19 07:21       


 


 11/14/19 07:19   108 H    20  


 


 11/14/19 07:15   108 H    20  














  BP Pulse Ox


 


 11/14/19 15:56  130/72  96


 


 11/14/19 14:44   99


 


 11/14/19 11:41  


 


 11/14/19 11:20  119/76  97


 


 11/14/19 10:38   97


 


 11/14/19 08:44  131/80  97


 


 11/14/19 07:21   98


 


 11/14/19 07:19   98


 


 11/14/19 07:15   98








 Weight











Weight                         305 lb 3.2 oz














I&O: 


 











 11/13/19 11/14/19 11/15/19





 06:59 06:59 06:59


 


Intake Total 550 1120 


 


Output Total 1250 925 


 


Balance -700 195 











Result Diagrams: 


 11/14/19 04:47





 11/14/19 04:47





Hospitalist ROS





- Review of Systems


Constitutional: denies: fever, chills


Respiratory: reports: cough





- Medication


Medications: 


Active Medications











Generic Name Dose Route Start Last Admin





  Trade Name Freq  PRN Reason Stop Dose Admin


 


Acetylcysteine  300 mg  11/12/19 14:00  11/14/19 14:43





  Mucomyst 10% (Oral Or Inh)  NEB   Not Given





  Q8HR NAGA   





     





     





     





     


 


Acidophilus  1 tab  11/12/19 12:00  11/14/19 12:14





  Floranex  PO   1 tab





  1200 NAGA   Administration





     





     





     





     


 


Albuterol/Ipratropium  3 ml  11/13/19 14:30  11/14/19 14:44





  Duoneb  NEB   3 ml





  Q1AU-WJ NAGA   Administration





     





     





     





     


 


Brimonidine Tartrate  1 drop  11/12/19 09:00  11/14/19 16:01





  Alphagan 0.2% Ophth Soln  R EYE   1 drop





  TID NAGA   Administration





     





     





     





     


 


Calcium Carbonate  500 mg  11/12/19 08:00  11/14/19 16:05





  Oscal-500  PO   500 mg





  BID-WM NAGA   Administration





     





     





     





     


 


Cholecalciferol  5,000 units  11/12/19 09:00  11/14/19 08:48





  Vitamin D3  PO   5,000 units





  DAILY NAGA   Administration





     





     





     





     


 


Cyclobenzaprine HCl  10 mg  11/12/19 09:00  11/14/19 16:02





  Flexeril  PO   10 mg





  TID NAGA   Administration





     





     





     





     


 


Diltiazem HCl  120 mg  11/13/19 09:00  11/14/19 08:48





  Cardizem Cd  PO   120 mg





  DAILY NAGA   Administration





     





     





     





     


 


Docusate Sodium  100 mg  11/12/19 21:00  11/13/19 22:26





  Colace  PO   100 mg





  HS NAGA   Administration





     





     





     





     


 


Enoxaparin Sodium  140 mg  11/12/19 21:00  11/14/19 08:51





  Lovenox  SC   140 mg





  0900,2100 NAGA   Administration





     





     





     





     


 


Fluoxetine HCl  20 mg  11/12/19 09:00  11/14/19 08:51





  Prozac  PO   20 mg





  BID NAGA   Administration





     





     





     





     


 


Gabapentin  800 mg  11/12/19 09:00  11/14/19 16:04





  Neurontin  PO   800 mg





  QID NAGA   Administration





     





     





     





     


 


Guaifenesin  600 mg  11/12/19 21:00  11/14/19 08:52





  Mucinex  PO   600 mg





  Q12HR NAGA   Administration





     





     





     





     


 


Hyoscyamine Sulfate  0.25 mg  11/12/19 09:00  11/14/19 16:04





  Levsin  PO   0.25 mg





  QID NAGA   Administration





     





     





     





     


 


Piperacillin Sod/Tazobactam  100 mls @ 200 mls/hr  11/13/19 18:00  11/14/19 12:

15





  Sod 3.375 gm/ Sodium Chloride  IVPB   100 mls





  Q6HR NAGA   Administration





     





     





     





     


 


Vancomycin HCl 1 gm/ Device  200 mls @ 200 mls/hr  11/13/19 15:00  11/14/19 16:

02





  IVPB   200 mls





  0300,1500 NAGA   Administration





     





     





     





     


 


Latanoprost  1 drop  11/12/19 21:00  11/13/19 22:24





  Xalatan 0.005% Ophth Soln  R EYE   1 drop





  HS NAGA   Administration





     





     





     





     


 


Mometasone Furoate/Formoterol Fumar  2 puff  11/12/19 18:30  11/14/19 07:15





  Dulera 100 Mcg/5 Mcg Inhaler  INH   2 puff





  BID-RT NAGA   Administration





     





     





     





     


 


Montelukast Sodium  10 mg  11/12/19 21:00  11/13/19 22:25





  Singulair  PO   10 mg





  QPM NAGA   Administration





     





     





     





     


 


Pantoprazole Sodium  40 mg  11/14/19 09:00  11/14/19 08:52





  Protonix  PO   40 mg





  DAILY NAGA   Administration





     





     





     





     


 


Saccharomyces Boulardii  250 mg  11/13/19 09:00  11/14/19 08:52





  Florastor  PO   250 mg





  DAILY NAGA   Administration





     





     





     





     


 


Sodium Chloride  10 ml  11/12/19 09:00  11/14/19 08:59





  Flush - Normal Saline  IVF   10 ml





  Q12HR NAGA   Administration





     





     





     





     


 


Valacyclovir HCl  1,000 mg  11/12/19 21:00  11/14/19 08:52





  Valtrex  PO   1,000 mg





  BID NAGA   Administration





     





     





     





     














- Exam


General Appearance: NAD, awake alert


Eye: PERRL, anicteric sclera


ENT: normocephalic atraumatic, no oropharyngeal lesions


Neck: supple, symmetric, no JVD, no carotid bruit


Heart: RRR, no murmur, no gallops, no rubs


Respiratory: CTAB, no wheezes, no ronchi


Respiratory - other findings: mild rale at base 


Gastrointestinal: soft, non-tender, non-distended, normal bowel sounds


Extremities: no cyanosis, no clubbing, no edema


Skin: normal turgor, no lesions, no rashes


Skin - other findings: 2+  DP pulses. Feet are no longer purple and warm to 

touch 


Neurological: cranial nerve grossly intact, normal sensation to touch


Neurological - other findings: paraplegic due to MS 


Musculoskeletal: normal tone, normal strength, no muscle wasting


Psychiatric: normal affect, normal behavior, A&O x 3, oriented to person





Hosp A/P





- Plan


Venogram: occlusive DVT involving right common femoral and profunda femoral 

veins with nonocclusive DVT involving RLE superficial femoral and popliteal 

veins. 


Abd Ultrasound: unremarkable


CTA thorax: bibasilar lung atelectasis, no PE 


ECHO: 55-60% with small pericardial effusion 





This is a 59 year old female with past medical history of MS, COPD, neurogenic 

bladder with chronic rothman, ulcerative colitis with colostomy who presented 

with shortness of breath and cough, found to be tachycardic with WBC of 18, s/p 

IV levaquin and ceftriaxone in the ER 








Sepsis secondary to possible pneumonia 


- patient had low grade temp, tachycardia, WBC of 18. S/p IV levaquin and 

ceftriaxone with improvement. Chest  Xray and CTA show no pneumonia, however 

WBC has increased to 20. Started vanc and zosyn 11/13.  Continue since WBC has 

improved


- blood cultures show 1/2 staph, repeat cultures pending


- urine culture growing candida only 25 to 75,000 will not treat given patient 

not symptomatic from this however catheter should be changed if not done


- sputum culture pending











#Right common femoral DVT


#Chronic lymphedema


- started on empiric lovenox 1 mg SC BID. Will switch to apixaban given that 

skin color is now normal and temp is warm with palpable pulses


- JUAN ANTONIO is normal








Small pericardial effusion


- no evidence of tamponade on ECHO. Will monitor








Dysphagia


- speech consult  recommended mechanical softt diet with thin liquids 





Morbid obesity


- dietary nutrition consult, following


- patient requesting 1500 jair diet








COPD


- continue duoneb q4 and albuterol prn


- will hold off on steroid for now unless significant wheezing


- continue advair and singulair 











Nausea/Elevated ALP 


-  RUQ ultrasound normal 








Ulcerative colitis/Crohn's s/p colectomy


- hold mesalamine for now








MS with paraplegia- continue baclofen and gabapentin. PT consult 








Recurrent Shingles


- continue valtrex








Depression -  fluoxetine 








Glaucoma - continue eye drops 











Dispo: home with home health. F/u repeat blood cultures. Consider d/c 1-2 days


Code status: DNR/DNI

## 2019-11-15 LAB
ALBUMIN SERPL BCG-MCNC: 3.3 G/DL (ref 3.5–5)
ALP SERPL-CCNC: 90 U/L (ref 40–110)
ALT SERPL W P-5'-P-CCNC: 142 U/L (ref 8–55)
ANION GAP SERPL CALC-SCNC: 12 MMOL/L (ref 10–20)
AST SERPL-CCNC: 49 U/L (ref 5–34)
BILIRUB SERPL-MCNC: 0.3 MG/DL (ref 0.2–1.2)
BUN SERPL-MCNC: 10 MG/DL (ref 9.8–20.1)
CALCIUM SERPL-MCNC: 9 MG/DL (ref 7.8–10.44)
CHLORIDE SERPL-SCNC: 109 MMOL/L (ref 98–107)
CO2 SERPL-SCNC: 21 MMOL/L (ref 22–29)
CREAT CL PREDICTED SERPL C-G-VRATE: 227 ML/MIN (ref 70–130)
GLOBULIN SER CALC-MCNC: 3.3 G/DL (ref 2.4–3.5)
GLUCOSE SERPL-MCNC: 106 MG/DL (ref 70–105)
HGB BLD-MCNC: 13.1 G/DL (ref 12–16)
MCH RBC QN AUTO: 29.2 PG (ref 27–31)
MCV RBC AUTO: 92.1 FL (ref 78–98)
PLATELET # BLD AUTO: 507 THOU/UL (ref 130–400)
POTASSIUM SERPL-SCNC: 3.6 MMOL/L (ref 3.5–5.1)
RBC # BLD AUTO: 4.49 MILL/UL (ref 4.2–5.4)
SODIUM SERPL-SCNC: 138 MMOL/L (ref 136–145)
WBC # BLD AUTO: 10.7 THOU/UL (ref 4.8–10.8)

## 2019-11-15 RX ADMIN — MOMETASONE FUROATE AND FORMOTEROL FUMARATE DIHYDRATE SCH PUFF: 100; 5 AEROSOL RESPIRATORY (INHALATION) at 21:51

## 2019-11-15 RX ADMIN — Medication SCH ML: at 22:19

## 2019-11-15 RX ADMIN — VANCOMYCIN HYDROCHLORIDE SCH MLS: 1 INJECTION, SOLUTION INTRAVENOUS at 02:35

## 2019-11-15 RX ADMIN — ACETYLCYSTEINE SCH: 100 SOLUTION ORAL; RESPIRATORY (INHALATION) at 14:28

## 2019-11-15 RX ADMIN — BRIMONIDINE TARTRATE SCH DROP: 2 SOLUTION OPHTHALMIC at 22:14

## 2019-11-15 RX ADMIN — PANTOPRAZOLE SODIUM SCH MG: 40 GRANULE, DELAYED RELEASE ORAL at 09:02

## 2019-11-15 RX ADMIN — LACTOBACILLUS ACIDOPH-L.BULGARICUS 1 MILLION CELL CHEWABLE TABLET SCH TAB: at 12:28

## 2019-11-15 RX ADMIN — MOMETASONE FUROATE AND FORMOTEROL FUMARATE DIHYDRATE SCH: 100; 5 AEROSOL RESPIRATORY (INHALATION) at 18:42

## 2019-11-15 RX ADMIN — Medication SCH ML: at 09:04

## 2019-11-15 RX ADMIN — MOMETASONE FUROATE AND FORMOTEROL FUMARATE DIHYDRATE SCH: 100; 5 AEROSOL RESPIRATORY (INHALATION) at 07:24

## 2019-11-15 RX ADMIN — BRIMONIDINE TARTRATE SCH DROP: 2 SOLUTION OPHTHALMIC at 09:02

## 2019-11-15 RX ADMIN — ACETYLCYSTEINE SCH: 100 SOLUTION ORAL; RESPIRATORY (INHALATION) at 08:58

## 2019-11-15 RX ADMIN — BRIMONIDINE TARTRATE SCH DROP: 2 SOLUTION OPHTHALMIC at 14:25

## 2019-11-15 NOTE — PDOC.HOSPP
- Subjective


Encounter Date: 11/15/19


Encounter Time: 12:54


Subjective: 





Patient is doing better. She is finally able to cough up some phlegm. States it 

was whitish yellow. She sent a sputum sample. 





She complains that the IV vancomycin is making her hand red and swollen. 

Discussed discontinuing this since repeat  blood cultures negative. Patient 

denies dysuria 





Per tech patient's right foot went blue again and went back to pink after 

elevating her leg 





- Objective


Vital Signs & Weight: 


 Vital Signs (12 hours)











  Temp Pulse Pulse Pulse Resp BP BP


 


 11/15/19 12:00  98.3 F  123 H    18  


 


 11/15/19 10:34   113 H    18  


 


 11/15/19 10:06    118 H  117 H   117/65  111/73


 


 11/15/19 09:03   118 H     


 


 11/15/19 08:00  97.8 F  118 H    16  


 


 11/15/19 07:23   104 H    16  


 


 11/15/19 04:00  98.3 F  85    18  


 


 11/15/19 01:43   92    18  














  BP Pulse Ox


 


 11/15/19 12:00  117/63  98


 


 11/15/19 10:34   99


 


 11/15/19 10:06  


 


 11/15/19 09:03  


 


 11/15/19 08:00  120/74  97


 


 11/15/19 07:23   99


 


 11/15/19 04:00  127/78  96


 


 11/15/19 01:43   98








 Weight











Weight                         305 lb 3.2 oz














I&O: 


 











 11/14/19 11/15/19 11/16/19





 06:59 06:59 06:59


 


Intake Total 1120 1340 


 


Output Total 925 730 


 


Balance 195 610 











Result Diagrams: 


 11/15/19 04:58





 11/15/19 04:58





Hospitalist ROS





- Review of Systems


Constitutional: denies: fever, chills





- Medication


Medications: 


Active Medications











Generic Name Dose Route Start Last Admin





  Trade Name Freq  PRN Reason Stop Dose Admin


 


Acetylcysteine  300 mg  11/12/19 14:00  11/15/19 08:58





  Mucomyst 10% (Oral Or Inh)  NEB   Not Given





  Q8HR NAGA   





     





     





     





     


 


Acidophilus  1 tab  11/12/19 12:00  11/15/19 12:28





  Floranex  PO   1 tab





  1200 NAGA   Administration





     





     





     





     


 


Albuterol/Ipratropium  3 ml  11/13/19 14:30  11/15/19 10:34





  Duoneb  NEB   3 ml





  B1ES-FI NAGA   Administration





     





     





     





     


 


Apixaban  10 mg  11/14/19 21:00  11/15/19 09:03





  Eliquis  PO   10 mg





  BID NAGA   Administration





     





     





     





     


 


Brimonidine Tartrate  1 drop  11/12/19 09:00  11/15/19 09:02





  Alphagan 0.2% Ophth Soln  R EYE   1 drop





  TID NAGA   Administration





     





     





     





     


 


Calcium Carbonate  500 mg  11/12/19 08:00  11/15/19 09:04





  Oscal-500  PO   500 mg





  BID-WM NAGA   Administration





     





     





     





     


 


Cholecalciferol  5,000 units  11/12/19 09:00  11/15/19 09:02





  Vitamin D3  PO   5,000 units





  DAILY NAGA   Administration





     





     





     





     


 


Cyclobenzaprine HCl  10 mg  11/12/19 09:00  11/15/19 09:04





  Flexeril  PO   10 mg





  TID NAGA   Administration





     





     





     





     


 


Diltiazem HCl  120 mg  11/13/19 09:00  11/15/19 09:03





  Cardizem Cd  PO   120 mg





  DAILY NAGA   Administration





     





     





     





     


 


Docusate Sodium  100 mg  11/12/19 21:00  11/14/19 21:08





  Colace  PO   100 mg





  HS NAGA   Administration





     





     





     





     


 


Fluoxetine HCl  20 mg  11/12/19 09:00  11/15/19 09:03





  Prozac  PO   20 mg





  BID NAGA   Administration





     





     





     





     


 


Gabapentin  800 mg  11/12/19 09:00  11/15/19 12:28





  Neurontin  PO   800 mg





  QID NAGA   Administration





     





     





     





     


 


Guaifenesin  600 mg  11/12/19 21:00  11/15/19 09:04





  Mucinex  PO   600 mg





  Q12HR NAGA   Administration





     





     





     





     


 


Hyoscyamine Sulfate  0.25 mg  11/12/19 09:00  11/15/19 09:02





  Levsin  PO   0.25 mg





  QID NAGA   Administration





     





     





     





     


 


Piperacillin Sod/Tazobactam  100 mls @ 200 mls/hr  11/13/19 18:00  11/15/19 12:

29





  Sod 3.375 gm/ Sodium Chloride  IVPB   100 mls





  Q6HR NAGA   Administration





     





     





     





     


 


Vancomycin HCl 1 gm/ Device  200 mls @ 200 mls/hr  11/13/19 15:00  11/15/19 02:

35





  IVPB   200 mls





  0300,1500 NAGA   Administration





     





     





     





     


 


Latanoprost  1 drop  11/12/19 21:00  11/14/19 21:07





  Xalatan 0.005% Ophth Soln  R EYE   1 drop





  HS NAGA   Administration





     





     





     





     


 


Mometasone Furoate/Formoterol Fumar  2 puff  11/12/19 18:30  11/15/19 07:24





  Dulera 100 Mcg/5 Mcg Inhaler  INH   Not Given





  BID-RT NAGA   





     





     





     





     


 


Montelukast Sodium  10 mg  11/12/19 21:00  11/14/19 21:08





  Singulair  PO   10 mg





  QPM NAGA   Administration





     





     





     





     


 


Pantoprazole Sodium  40 mg  11/14/19 09:00  11/15/19 09:02





  Protonix  PO   40 mg





  DAILY NAGA   Administration





     





     





     





     


 


Saccharomyces Boulardii  250 mg  11/13/19 09:00  11/15/19 09:03





  Florastor  PO   250 mg





  DAILY NAGA   Administration





     





     





     





     


 


Sodium Chloride  10 ml  11/12/19 09:00  11/15/19 09:04





  Flush - Normal Saline  IVF   10 ml





  Q12HR NAGA   Administration





     





     





     





     


 


Valacyclovir HCl  1,000 mg  11/12/19 21:00  11/15/19 09:02





  Valtrex  PO   1,000 mg





  BID NAGA   Administration





     





     





     





     














- Exam


General Appearance: NAD, awake alert


Eye: PERRL, anicteric sclera


ENT: normocephalic atraumatic, no oropharyngeal lesions


Neck: supple, symmetric, no JVD, no thyromegaly


Heart: RRR, no murmur, no gallops, no rubs


Respiratory: CTAB, no wheezes, no rales, no ronchi


Gastrointestinal: soft, non-tender, non-distended, normal bowel sounds


Extremities: no cyanosis, no clubbing, 2+ LE edema (nonpitting. Feet are pink, 

warm to palpation with 2+ pulses)


Extremities - other findings: right hand erythema and swelling with some 

warmth. 


Skin: normal turgor, no lesions, no rashes


Neurological: cranial nerve grossly intact, normal sensation to touch


Neurological - other findings: paraplegic 


Psychiatric: normal affect, normal behavior, A&O x 3





Hosp A/P





- Plan


Venogram: occlusive DVT involving right common femoral and profunda femoral 

veins with nonocclusive DVT involving RLE superficial femoral and popliteal 

veins. 


Abd Ultrasound: unremarkable


CTA thorax: bibasilar lung atelectasis, no PE 


ECHO: 55-60% with small pericardial effusion 





This is a 59 year old female with past medical history of MS, COPD, neurogenic 

bladder with chronic rothman, ulcerative colitis with colostomy who presented 

with shortness of breath and cough, found to be tachycardic with WBC of 18, s/p 

IV levaquin and ceftriaxone in the ER 








Sepsis secondary to possible pneumonia 


- patient had low grade temp, tachycardia, WBC of 18. CTA showed no pneumonia, 

however clinically had rales, therefore treating empirically. . Was on 

ceftriaxone and azithromycin, switched to vanc and zosyn 11/13 due to 

increasing leukocytosis


-leukocytosis  resolved, will d/c vancomycin, continue IV zosyn. Sputum culture 

pending. 


- blood cultures show 1/2 staph, repeat cultures negative


- urine culture growing candida only 25 to 75,000 will not treat given patient 

not symptomatic from this however catheter should be changed 














#Right common femoral DVT


#Chronic lymphedema


- started lovenox 1 mg SC BID and switched to apixaban 10 mg bid x 7 days (

currently day 2), then 5 mg po bid after 


- JUAN ANTONIO is normal








Small pericardial effusion


- no evidence of tamponade on ECHO. Will monitor








Dysphagia


- speech consult  recommended mechanical softt diet with thin liquids. Now 

upgraded to regular 





Morbid obesity


- dietary nutrition consult, following


- patient on 1500 jair diet, states it is still too much food 








COPD


- continue duoneb q4 and albuterol prn


- will hold off on steroid for now unless significant wheezing


- continue advair and singulair 








Transaminitis


- AST/ALT increasing slightly. 


-  RUQ ultrasound normal 


- not on tylenol 








Ulcerative colitis/Crohn's s/p colectomy


- hold mesalamine for now








MS with paraplegia- continue baclofen and gabapentin. PT consult 








Recurrent Shingles


- continue valtrex








Depression -  fluoxetine 








Glaucoma - continue eye drops 











Dispo:  f/u sputum culture. Continue IV zosyn for another day, consider 

switching to oral tomorrow 


Code status: DNR/DNI

## 2019-11-16 LAB
ALBUMIN SERPL BCG-MCNC: 3.1 G/DL (ref 3.5–5)
ALP SERPL-CCNC: 86 U/L (ref 40–110)
ALT SERPL W P-5'-P-CCNC: 128 U/L (ref 8–55)
ANION GAP SERPL CALC-SCNC: 10 MMOL/L (ref 10–20)
AST SERPL-CCNC: 42 U/L (ref 5–34)
BILIRUB SERPL-MCNC: 0.3 MG/DL (ref 0.2–1.2)
BUN SERPL-MCNC: 10 MG/DL (ref 9.8–20.1)
CALCIUM SERPL-MCNC: 9 MG/DL (ref 7.8–10.44)
CHLORIDE SERPL-SCNC: 108 MMOL/L (ref 98–107)
CO2 SERPL-SCNC: 23 MMOL/L (ref 22–29)
CREAT CL PREDICTED SERPL C-G-VRATE: 227 ML/MIN (ref 70–130)
GLOBULIN SER CALC-MCNC: 3.4 G/DL (ref 2.4–3.5)
GLUCOSE SERPL-MCNC: 95 MG/DL (ref 70–105)
HGB BLD-MCNC: 13.5 G/DL (ref 12–16)
HGB BLD-MCNC: 13.5 G/DL (ref 12–16)
MCH RBC QN AUTO: 29.6 PG (ref 27–31)
MCV RBC AUTO: 91.2 FL (ref 78–98)
PLATELET # BLD AUTO: 479 THOU/UL (ref 130–400)
PLATELET # BLD AUTO: 484 THOU/UL (ref 130–400)
POTASSIUM SERPL-SCNC: 3.4 MMOL/L (ref 3.5–5.1)
RBC # BLD AUTO: 4.57 MILL/UL (ref 4.2–5.4)
SODIUM SERPL-SCNC: 138 MMOL/L (ref 136–145)
WBC # BLD AUTO: 14.4 THOU/UL (ref 4.8–10.8)

## 2019-11-16 RX ADMIN — ACETYLCYSTEINE SCH: 100 SOLUTION ORAL; RESPIRATORY (INHALATION) at 22:21

## 2019-11-16 RX ADMIN — BRIMONIDINE TARTRATE SCH DROP: 2 SOLUTION OPHTHALMIC at 22:19

## 2019-11-16 RX ADMIN — ACETYLCYSTEINE SCH: 100 SOLUTION ORAL; RESPIRATORY (INHALATION) at 08:35

## 2019-11-16 RX ADMIN — ACETYLCYSTEINE SCH: 100 SOLUTION ORAL; RESPIRATORY (INHALATION) at 00:11

## 2019-11-16 RX ADMIN — ACETYLCYSTEINE SCH: 100 SOLUTION ORAL; RESPIRATORY (INHALATION) at 14:32

## 2019-11-16 RX ADMIN — PANTOPRAZOLE SODIUM SCH MG: 40 GRANULE, DELAYED RELEASE ORAL at 09:25

## 2019-11-16 RX ADMIN — MOMETASONE FUROATE AND FORMOTEROL FUMARATE DIHYDRATE SCH PUFF: 100; 5 AEROSOL RESPIRATORY (INHALATION) at 08:01

## 2019-11-16 RX ADMIN — Medication SCH ML: at 22:21

## 2019-11-16 RX ADMIN — LACTOBACILLUS ACIDOPH-L.BULGARICUS 1 MILLION CELL CHEWABLE TABLET SCH TAB: at 13:13

## 2019-11-16 RX ADMIN — BRIMONIDINE TARTRATE SCH DROP: 2 SOLUTION OPHTHALMIC at 15:43

## 2019-11-16 RX ADMIN — Medication SCH ML: at 15:44

## 2019-11-16 RX ADMIN — MOMETASONE FUROATE AND FORMOTEROL FUMARATE DIHYDRATE SCH PUFF: 100; 5 AEROSOL RESPIRATORY (INHALATION) at 18:54

## 2019-11-16 RX ADMIN — BRIMONIDINE TARTRATE SCH DROP: 2 SOLUTION OPHTHALMIC at 09:25

## 2019-11-16 NOTE — PDOC.HOSPP
- Subjective


Subjective: 





Doing ok.  No specific complaints.  Has some questions, but no problems 

reported.





- Objective


Vital Signs & Weight: 


 Vital Signs (12 hours)











  Temp Pulse Resp BP BP Pulse Ox


 


 11/16/19 14:18   113 H  18    99


 


 11/16/19 11:39  97.8 F  109 H  18   122/74  99


 


 11/16/19 10:53   110 H  18    98


 


 11/16/19 09:23   106 H   118/70  


 


 11/16/19 09:15  98.5 F  106 H  16   118/70  98


 


 11/16/19 07:49   104 H  16    100


 


 11/16/19 04:00  98.0 F  105 H  17   107/71  96








 Weight











Weight                         305 lb 3.2 oz














I&O: 


 











 11/15/19 11/16/19 11/17/19





 06:59 06:59 06:59


 


Intake Total 1340 2950 


 


Output Total 730 750 


 


Balance 610 2200 











Result Diagrams: 


 11/16/19 04:11





 11/16/19 04:12





Hospitalist ROS





- Medication


Medications: 


Active Medications











Generic Name Dose Route Start Last Admin





  Trade Name Fretomas  PRN Reason Stop Dose Admin


 


Acetylcysteine  300 mg  11/12/19 14:00  11/16/19 14:32





  Mucomyst 10% (Oral Or Inh)  NEB   Not Given





  Q8HR NAGA   





     





     





     





     


 


Acidophilus  1 tab  11/12/19 12:00  11/16/19 13:13





  Floranex  PO   1 tab





  1200 NAGA   Administration





     





     





     





     


 


Albuterol/Ipratropium  3 ml  11/13/19 14:30  11/16/19 14:18





  Duoneb  NEB   3 ml





  H3FT-PT NAGA   Administration





     





     





     





     


 


Apixaban  10 mg  11/14/19 21:00  11/16/19 09:24





  Eliquis  PO   10 mg





  BID NAGA   Administration





     





     





     





     


 


Brimonidine Tartrate  1 drop  11/12/19 09:00  11/16/19 09:25





  Alphagan 0.2% Ophth Soln  R EYE   1 drop





  TID NAGA   Administration





     





     





     





     


 


Calcium Carbonate  500 mg  11/12/19 08:00  11/16/19 09:23





  Oscal-500  PO   500 mg





  BID-WM NAGA   Administration





     





     





     





     


 


Cholecalciferol  5,000 units  11/12/19 09:00  11/16/19 09:23





  Vitamin D3  PO   5,000 units





  DAILY NAGA   Administration





     





     





     





     


 


Cyclobenzaprine HCl  10 mg  11/12/19 09:00  11/16/19 11:21





  Flexeril  PO   10 mg





  TID NAGA   Administration





     





     





     





     


 


Diltiazem HCl  120 mg  11/13/19 09:00  11/16/19 09:23





  Cardizem Cd  PO   120 mg





  DAILY NAGA   Administration





     





     





     





     


 


Docusate Sodium  100 mg  11/12/19 21:00  11/15/19 22:18





  Colace  PO   100 mg





  HS NAGA   Administration





     





     





     





     


 


Fluoxetine HCl  20 mg  11/12/19 09:00  11/16/19 09:25





  Prozac  PO   20 mg





  BID NAGA   Administration





     





     





     





     


 


Gabapentin  800 mg  11/12/19 09:00  11/16/19 13:13





  Neurontin  PO   800 mg





  QID NAGA   Administration





     





     





     





     


 


Guaifenesin  600 mg  11/12/19 21:00  11/16/19 09:24





  Mucinex  PO   600 mg





  Q12HR NAGA   Administration





     





     





     





     


 


Hyoscyamine Sulfate  0.25 mg  11/12/19 09:00  11/16/19 11:21





  Levsin  PO   0.25 mg





  QID NAGA   Administration





     





     





     





     


 


Piperacillin Sod/Tazobactam  100 mls @ 200 mls/hr  11/13/19 18:00  11/16/19 13:

13





  Sod 3.375 gm/ Sodium Chloride  IVPB   100 mls





  Q6HR NAGA   Administration





     





     





     





     


 


Latanoprost  1 drop  11/12/19 21:00  11/15/19 22:19





  Xalatan 0.005% Ophth Soln  R EYE   1 drop





  HS NAGA   Administration





     





     





     





     


 


Mometasone Furoate/Formoterol Fumar  2 puff  11/12/19 18:30  11/16/19 08:01





  Dulera 100 Mcg/5 Mcg Inhaler  INH   2 puff





  BID-RT NAGA   Administration





     





     





     





     


 


Montelukast Sodium  10 mg  11/12/19 21:00  11/15/19 22:19





  Singulair  PO   10 mg





  QPM NAGA   Administration





     





     





     





     


 


Pantoprazole Sodium  40 mg  11/14/19 09:00  11/16/19 09:25





  Protonix  PO   40 mg





  DAILY NAGA   Administration





     





     





     





     


 


Saccharomyces Boulardii  250 mg  11/13/19 09:00  11/16/19 09:25





  Florastor  PO   250 mg





  DAILY NAGA   Administration





     





     





     





     


 


Sodium Chloride  10 ml  11/12/19 09:00  11/15/19 22:19





  Flush - Normal Saline  IVF   10 ml





  Q12HR NAGA   Administration





     





     





     





     


 


Valacyclovir HCl  1,000 mg  11/12/19 21:00  11/16/19 09:25





  Valtrex  PO   1,000 mg





  BID NAGA   Administration





     





     





     





     














- Exam


General Appearance: NAD, awake alert


General - other findings: Morbidly obese.


Heart: RRR, no murmur, no gallops, no rubs, normal peripheral pulses


Respiratory: CTAB, no wheezes, no rales, no ronchi, normal chest expansion, no 

tachypnea, normal percussion


Gastrointestinal: soft, non-tender, non-distended, normal bowel sounds, no 

palpable masses, no hepatomegaly, no splenomegaly, no bruit


Extremities: no cyanosis


Extremities - other findings: Trace edema.  Muscle atrophy 


Skin: normal turgor


Neurological - other findings: LE's non-functional. 


Psychiatric: normal affect, normal behavior, A&O x 3





Hosp A/P


(1) DVT (deep venous thrombosis)


Code(s): I82.409 - ACUTE EMBOLISM AND THOMBOS UNSP DEEP VN UNSP LOWER EXTREMITY

   Status: Acute   





(2) Colostomy in place


Code(s): Z93.3 - COLOSTOMY STATUS   Status: Chronic   





(3) Functional quadriplegia secondary to MS


Code(s): G35 - MULTIPLE SCLEROSIS; R53.2 - FUNCTIONAL QUADRIPLEGIA   Status: 

Chronic   





(4) HTN (hypertension)


Code(s): I10 - ESSENTIAL (PRIMARY) HYPERTENSION   Status: Chronic   





(5) Morbid obesity


Code(s): E66.01 - MORBID (SEVERE) OBESITY DUE TO EXCESS CALORIES   Status: 

Chronic   





(6) Multiple sclerosis, secondary progressive


Code(s): G35 - MULTIPLE SCLEROSIS   Status: Chronic   





(7) Neurogenic bladder


Code(s): N31.9 - NEUROMUSCULAR DYSFUNCTION OF BLADDER, UNSPECIFIED   Status: 

Chronic   





(8) Transaminitis


Code(s): R74.0 - NONSPEC ELEV OF LEVELS OF TRANSAMNS & LACTIC ACID DEHYDRGNSE   

Status: Acute   





(9) COPD (chronic obstructive pulmonary disease)


Status: Acute   





(10) Dysphagia


Code(s): R13.10 - DYSPHAGIA, UNSPECIFIED   Status: Acute   





(11) IBD (inflammatory bowel disease)


Code(s): K52.9 - NONINFECTIVE GASTROENTERITIS AND COLITIS, UNSPECIFIED   Status

: Acute   





(12) Glaucoma


Code(s): H40.9 - UNSPECIFIED GLAUCOMA   Status: Acute   





(13) Morbid obesity with BMI of 40.0-44.9, adult


Code(s): E66.01 - MORBID (SEVERE) OBESITY DUE TO EXCESS CALORIES; Z68.41 - BODY 

MASS INDEX (BMI) 40.0-44.9, ADULT   Status: Acute   





- Plan





CT chest showed some atelectasis and chronic changes, but no specific 

infiltrate.


Complete course of oral antibiotics.


Continue with NOAC.  Her  was on Warfarin and we discussed the 

difference in meds and management.


Discussed her on-going activity level and therapy. 


Medically stable for DC, but her caregiver will not be available until 

tomorrow.  





Continue mechanical soft diet.





Elevated LFT's likely due to some CULVER.

## 2019-11-17 VITALS — TEMPERATURE: 99.8 F | SYSTOLIC BLOOD PRESSURE: 112 MMHG | DIASTOLIC BLOOD PRESSURE: 71 MMHG

## 2019-11-17 LAB
BASOPHILS # BLD AUTO: 0.1 THOU/UL (ref 0–0.2)
BASOPHILS NFR BLD AUTO: 0.3 % (ref 0–1)
EOSINOPHIL # BLD AUTO: 0.6 THOU/UL (ref 0–0.7)
EOSINOPHIL NFR BLD AUTO: 3.8 % (ref 0–10)
HGB BLD-MCNC: 13 G/DL (ref 12–16)
LYMPHOCYTES # BLD: 3.6 THOU/UL (ref 1.2–3.4)
LYMPHOCYTES NFR BLD AUTO: 23 % (ref 21–51)
MCH RBC QN AUTO: 29.7 PG (ref 27–31)
MCV RBC AUTO: 92.7 FL (ref 78–98)
MONOCYTES # BLD AUTO: 1.2 THOU/UL (ref 0.11–0.59)
MONOCYTES NFR BLD AUTO: 7.9 % (ref 0–10)
NEUTROPHILS # BLD AUTO: 10.1 THOU/UL (ref 1.4–6.5)
NEUTROPHILS NFR BLD AUTO: 65 % (ref 42–75)
PLATELET # BLD AUTO: 518 THOU/UL (ref 130–400)
RBC # BLD AUTO: 4.38 MILL/UL (ref 4.2–5.4)
WBC # BLD AUTO: 15.6 THOU/UL (ref 4.8–10.8)

## 2019-11-17 RX ADMIN — BRIMONIDINE TARTRATE SCH DROP: 2 SOLUTION OPHTHALMIC at 15:22

## 2019-11-17 RX ADMIN — LACTOBACILLUS ACIDOPH-L.BULGARICUS 1 MILLION CELL CHEWABLE TABLET SCH TAB: at 11:51

## 2019-11-17 RX ADMIN — ACETYLCYSTEINE SCH: 100 SOLUTION ORAL; RESPIRATORY (INHALATION) at 15:23

## 2019-11-17 RX ADMIN — Medication SCH ML: at 09:05

## 2019-11-17 RX ADMIN — BRIMONIDINE TARTRATE SCH DROP: 2 SOLUTION OPHTHALMIC at 10:20

## 2019-11-17 RX ADMIN — MOMETASONE FUROATE AND FORMOTEROL FUMARATE DIHYDRATE SCH PUFF: 100; 5 AEROSOL RESPIRATORY (INHALATION) at 07:33

## 2019-11-17 RX ADMIN — ACETYLCYSTEINE SCH: 100 SOLUTION ORAL; RESPIRATORY (INHALATION) at 06:20

## 2019-11-17 RX ADMIN — PANTOPRAZOLE SODIUM SCH MG: 40 GRANULE, DELAYED RELEASE ORAL at 09:03

## 2019-11-17 NOTE — RAD
RADIOGRAPH CHEST 1 VIEW:



DATE:

11/17/2019



HISTORY:

59-year-old female with leukocytosis



FINDINGS:

There is no airspace density, pulmonary edema, or pneumothorax. The lateral costophrenic angles are n
ot effaced. Prominent opacity at left base especially laterally, is unchanged compared to

11/11/2019, and was shown by recent CT to represent a large paracardial fat pad.



IMPRESSION:

No acute pulmonary findings.



Reported By: Onur Yates 

Electronically Signed:  11/17/2019 10:44 AM

## 2019-11-17 NOTE — DIS
DATE OF ADMISSION:  11/11/2019



DATE OF DISCHARGE:  11/17/2019



DISCHARGE DIAGNOSES:  

1. Sepsis.

2. Presumptive pneumonia.

3. Funguria.

4. Chronic obstructive pulmonary disease.

5. Chronic lymphedema.

6. Ulcerative colitis, status post colostomy.

7. History of multiple sclerosis with effective paraplegia.

8. History of depression.

9. History of glaucoma.

10. Deep venous thrombosis.

11. Morbid obesity.

12. Chronic neurogenic bladder, requiring Martinez.

13. Mild transaminitis.

14. Dysphagia, on mechanical soft diet.



HISTORY:  This patient is a 59-year-old female with debility from multiple sclerosis

with essentially nonfunctioning lower extremities, who is essentially bed bound with

chronic indwelling Martinez and colostomy.  She also has chronic morbid obesity.  She

had presented to the emergency department with cough and shortness of breath.  She

had a white count of 18,000 and tachycardia, some evidence of urinary tract

infection on UA.  She was started on Levaquin and Rocephin and admitted with sepsis,

felt to likely be related to pneumonia or urinary tract infection.  She had chronic

lower extremity lymphedema. 



HOSPITAL COURSE:  The patient was admitted, started on broad-spectrum antibiotics.

She had a lower extremity Doppler obtained, which revealed occlusive DVT involving

the right common femoral and profunda femoral veins with nonocclusive DVT involving

the right lower extremity, superficial femoral and popliteal veins.  She was

subsequently started on anticoagulation.  She was seen in consultation by Dr. Olsen

for the possibility of a filter; however, it was not felt to be indicated.  She was

also seen in consultation by Dr. Angeles, Pulmonology Service, who again reiterated to

the patient that much of her issue was related to her obesity and encouraged her to

continue to work toward addressing that.  She had a CTA of the chest, which showed

no evidence of pulmonary embolus and only showed some chronic findings and mild

basilar atelectasis.  She underwent echocardiogram revealing an EF of 55% to 60%.

Mildly dilated left atrium.  She had blood culture that grew a coagulase-negative

Staph, consistent with a contaminant.  Urine culture grew Candida with relatively

low-colony counts.  Respiratory culture only grew normal respiratory monisha, and

repeat blood cultures were negative.  The patient did initially receive some

Solu-Medrol in the emergency department.  Her white cell count actually increased

slightly, then came down and then started to come back up again toward the time of

her discharge.  Repeat chest x-ray remained negative, she was afebrile, although she

did continue to have some persistent tachycardia.  However, in addressing this with

the patient, she reported that she had been following with Dr. Castaneda as an

outpatient for persistent tachycardia and was aware that this was not a new finding

or new problem for her.  She felt quite well overall, had no specific complaints.

Her breathing was back to baseline and she was felt to be stable for discharge to

home. 



DISCHARGE PHYSICAL EXAMINATION:  VITAL SIGNS:  On the day of discharge, T-max was

98.4, pulse 109, respirations 22, O2 saturation 97% on room air, and BP was 111/70. 

GENERAL:  She was awake and alert, pleasant, no distress. 

HEART:  Her heart was tachy without murmurs. 

LUNGS:  Diminished at the bases.  Difficult to auscultate because of her body

habitus and obesity, but otherwise clear without significant rales. 

ABDOMEN:  Obese, soft, nontender, and nondistended. 

EXTREMITIES:  Had modest nonpitting edema.



DISPOSITION:  The patient will be discharged to home.



DISCHARGE MEDICATIONS:  She will continue with her usual home medications including;

1. Metoprolol 25 mg p.o. b.i.d.

2. Diltiazem  mg daily.

3. Calcium 500 b.i.d.

4. Valtrex 1000 mg b.i.d.

5. Baclofen 5 mg b.i.d.

6. Mucomyst inhaler p.r.n.

7. Baclofen 15 mg b.i.d.

8. Flexeril 10 mg t.i.d.

9. Vitamin D3, 5000 units daily.

10. Alphagan eye drops.

11. Advair HFA two inhalations b.i.d.

12. Prozac 20 mg b.i.d.

13. Docusate 100 mg at bedtime.

14. DuoNebs p.r.n.

15. Hyoscyamine 0.25 p.o. q.i.d.

16. Norco 7.5 q.4 hours p.r.n.

17. Gabapentin 800 mg q.i.d.

18. Meclizine 25 mg b.i.d. p.r.n.

19. Probiotic one p.o. daily.

20. Singulair 10 mg daily.

21. Mesalamine 1000 mg at bedtime p.r.n.

22. Multivitamin one p.o. daily.

23. Mupirocin topical p.r.n.

24. Simethicone p.r.n.

25. Mucinex DM one p.o. q.24 p.r.n.

26. Travatan 1 drop right eye at bedtime.

27. Sodium chloride one drop q.i.d.

28. She will have prescriptions for Diflucan 200 mg p.o. daily.

29. Eliquis 5 mg b.i.d.

30. Augmentin 875 one p.o. b.i.d.



DISCHARGE ACTIVITIES:  She will have activity as tolerated.



DISCHARGE DIET:  She will remain on a regular diet.



FOLLOWUP:  She will follow up with her PCP, Lesly Hines.  She can return to the

hospital at any time should she have the need to do so. 







Job ID:  [id number]

## 2019-11-17 NOTE — EKG
Test Reason : STAT

Blood Pressure : ***/*** mmHG

Vent. Rate : 124 BPM     Atrial Rate : 124 BPM

   P-R Int : 124 ms          QRS Dur : 086 ms

    QT Int : 306 ms       P-R-T Axes : -01 004 030 degrees

   QTc Int : 439 ms

 

Sinus tachycardia

Possible Anterior infarct , age undetermined

Abnormal ECG

When compared with ECG of 11-NOV-2019 17:03, (Unconfirmed)

Borderline criteria for Anterior infarct are now Present

Confirmed by DR. ALISSON BERNAL ([id number]) on 11/17/2019 4:08:18 PM

 

Referred By:  BRITTNEY           Confirmed By:DR. ALISSON BERNAL

## 2021-06-16 NOTE — PDOC.HOSPP
- Subjective


Encounter Date: 09/01/19


Encounter Time: 09:07


Subjective: 


Morbidly obese female with debilitating MS associated with paraparesis and 

neurogenic bladder s/p chronic indwelling rothman, UC s/p diversion colostomy, 

recently discharge following treatment for COPD exacerbation, now readmitted 

with worsening SOB and cough. Feeling better. Cough has subsided. Off oxygen 

since 2-3 days now. Desirous of going home today.





- Objective


Vital Signs & Weight: 


 Vital Signs (12 hours)











  Temp Pulse Resp BP Pulse Ox


 


 09/01/19 08:37   92  12  


 


 09/01/19 08:13   103 H  16  


 


 09/01/19 08:05  97.9 F  99  16  119/76  96


 


 09/01/19 07:53   103 H  16  


 


 09/01/19 01:38   91  14   95


 


 08/31/19 22:50   95  16   94 L








 Weight











Weight                         302 lb 11.2 oz














I&O: 


 











 08/31/19 09/01/19 09/02/19





 06:59 06:59 06:59


 


Intake Total 2260 1680 


 


Output Total 3400 1420 


 


Balance -1140 260 











Result Diagrams: 


 09/01/19 04:54





 09/01/19 04:54





Hospitalist ROS





- Medication


Medications: 


Active Medications











Generic Name Dose Route Start Last Admin





  Trade Name Freq  PRN Reason Stop Dose Admin


 


Acetylcysteine  300 mg  08/31/19 01:00  09/01/19 08:24





  Mucomyst 10% (Oral Or Inh)  INH   300 mg





  M0AY-[id number] NAGA   Administration





     





     





     





     


 


Acidophilus  1 tab  08/23/19 12:00  08/31/19 12:22





  Floranex  PO   1 tab





  1200 NAGA   Administration





     





     





     





     


 


Albuterol/Ipratropium  3 ml  08/26/19 10:30  09/01/19 07:53





  Duoneb  EZPAP   3 ml





  Q2WC-DE NAGA   Administration





     





     





     





     


 


Arformoterol Tartrate  15 mcg  08/23/19 18:30  09/01/19 08:13





  Brovana  NEB   15 mcg





  BID-RT NAGA   Administration





     





     





     





     


 


Baclofen  15 mg  08/23/19 05:00  09/01/19 05:22





  Lioresal  PO   15 mg





  0500,2300 NAGA   Administration





     





     





     





     


 


Baclofen  5 mg  08/24/19 11:00  08/31/19 16:19





  Lioresal  PO   5 mg





  1100,1700 NAGA   Administration





     





     





     





     


 


Benzonatate  100 mg  08/22/19 21:42  08/26/19 05:52





  Tessalon  PO   100 mg





  Q4H PRN   Administration





  Cough   





     





     





     


 


Brimonidine Tartrate  1 drop  08/23/19 09:00  08/31/19 20:29





  Alphagan 0.2% Ophth Soln  R EYE   1 drp





  TID NAGA   Administration





     





     





     





     


 


Budesonide  0.5 mg  08/26/19 18:30  09/01/19 07:53





  Pulmicort Neb Solution  INH   0.5 mg





  BID-RT NAGA   Administration





     





     





     





     


 


Cholecalciferol  5,000 units  08/23/19 09:00  08/31/19 09:11





  Vitamin D3  PO   5,000 units





  DAILY NAGA   Administration





     





     





     





     


 


Clotrimazole  1 gm  08/23/19 21:00  08/31/19 20:30





  Lotrimin 1% Cream  TOP   1 applic





  BID NAGA   Administration





     





     





     





     


 


Cyclobenzaprine HCl  10 mg  08/23/19 09:00  08/31/19 20:27





  Flexeril  PO   10 mg





  TID NAGA   Administration





     





     





     





     


 


Docusate Sodium  100 mg  08/23/19 21:00  08/31/19 20:27





  Colace  PO   100 mg





  HS NAGA   Administration





     





     





     





     


 


Enoxaparin Sodium  40 mg  08/23/19 09:00  08/31/19 09:12





  Lovenox  SC   40 mg





  0900 NAGA   Administration





     





     





     





     


 


Famotidine  20 mg  08/23/19 09:00  08/31/19 20:28





  Pepcid  PO   20 mg





  BID NAGA   Administration





     





     





     





     


 


Fluoxetine HCl  20 mg  08/23/19 09:00  08/31/19 20:27





  Prozac  PO   20 mg





  BID NAGA   Administration





     





     





     





     


 


Gabapentin  800 mg  08/23/19 09:00  08/31/19 20:26





  Neurontin  PO   800 mg





  QID NAGA   Administration





     





     





     





     


 


Guaifenesin/Dextromethorphan  1 tab  08/29/19 09:00  08/31/19 20:27





  Mucinex Dm  PO   1 tab





  Q12HR NAGA   Administration





     





     





     





     


 


Hyoscyamine Sulfate  0.25 mg  08/23/19 09:00  08/31/19 20:25





  Levsin  PO   0.25 mg





  QID NAGA   Administration





     





     





     





     


 


Cefepime HCl 1 gm/ Sodium  100 mls @ 200 mls/hr  08/28/19 09:00  08/31/19 20:24





  Chloride  IVPB   100 mls





  Q12HR NAGA   Administration





     





     





     





     


 


Iron/Minerals/Multivitamins  1 tab  08/23/19 17:00  08/31/19 16:19





  Theragran M  PO   1 tab





  QPM-WM NAAG   Administration





     





     





     





     


 


Latanoprost  1 drop  08/23/19 21:00  08/31/19 20:29





  Xalatan 0.005% Ophth Soln  R EYE   1 drp





  HS NAGA   Administration





     





     





     





     


 


Levofloxacin  750 mg  08/26/19 06:00  09/01/19 05:23





  Levaquin  PO  09/02/19 06:01  750 mg





  0600 NAGA   Administration





     





     





     





     


 


Mesalamine  1,000 mg  08/23/19 21:00  08/31/19 20:29





  Canasa  AR   1,000 mg





  HS NAGA   Administration





     





     





     





     


 


Methylprednisolone Sodium Succinate  20 mg  08/30/19 09:00  08/31/19 20:28





  Solu-Medrol  IVP   20 mg





  0900,2100 NAGA   Administration





     





     





     





     


 


Metoprolol Tartrate  25 mg  08/28/19 09:00  08/31/19 20:26





  Lopressor  PO   25 mg





  BID NAGA   Administration





     





     





     





     


 


Mometasone Furoate/Formoterol Fumar  2 puff  08/24/19 18:30  09/01/19 08:37





  Dulera 200 Mcg/5 Mcg Inhaler  INH   2 puff





  BID-RT NAGA   Administration





     





     





     





     


 


Montelukast Sodium  10 mg  08/26/19 21:00  08/31/19 20:26





  Singulair  PO   10 mg





  QPM NAGA   Administration





     





     





     





     


 


Mupirocin  0 gm  08/22/19 21:40  08/31/19 20:29





  Bactroban 2% Ointment  TOP   1 applic





  PRN PRN   Administration





  Wound Care   





     





     





     


 


Nystatin  0 gm  08/23/19 09:00  08/31/19 20:29





  Mycostatin Powder  TOP   1 applic





  BID NAGA   Administration





     





     





     





     


 


Saccharomyces Boulardii  250 mg  08/23/19 09:00  08/31/19 09:11





  Florastor  PO   250 mg





  QAM NAGA   Administration





     





     





     





     


 


Simethicone  240 mg  08/23/19 09:00  08/31/19 20:25





  Mylicon Chewable  PO   240 mg





  QID NAGA   Administration





     





     





     





     


 


Sodium Chloride  10 ml  08/22/19 21:34  08/27/19 20:59





  Flush - Normal Saline  IVF   10 ml





  PRN PRN   Administration





  Saline Flush   





     





     





     


 


Sodium Chloride  1 drop  08/23/19 09:00  08/31/19 20:23





  Param-128 5% Oph Sol  R EYE   1 drp





  QID NAGA   Administration





     





     





     





     


 


Throat Lozenges  1 ernie  08/24/19 12:33  08/30/19 16:10





  Cepastat Lozenges  PO   1 ernie





  Q2H PRN   Administration





  Sore Throat   





     





     





     














- Exam


General Appearance: awake alert


Eye: anicteric sclera


ENT: normocephalic atraumatic


Neck: symmetric


Heart: RRR


Respiratory: no wheezes, no ronchi, no tachypnea


Respiratory - other findings: good air entry bilaterally with few transmitted 

sound right base posteriorl


Gastrointestinal: soft, non-tender, non-distended, normal bowel sounds


Extremities: no cyanosis, no edema


Neurological: CN's grossly intact, no new deficit


Psychiatric: normal affect, A&O x 3





Hosp A/P


(1) Acute respiratory failure with hypoxia


Code(s): J96.01 - ACUTE RESPIRATORY FAILURE WITH HYPOXIA   Status: Suspected   





(2) COPD exacerbation


Code(s): J44.1 - CHRONIC OBSTRUCTIVE PULMONARY DISEASE W (ACUTE) EXACERBATION   

Status: Acute   





(3) Acute bronchitis


Code(s): J20.9 - ACUTE BRONCHITIS, UNSPECIFIED   Status: Acute   





(4) Complicated UTI (urinary tract infection)


Code(s): N39.0 - URINARY TRACT INFECTION, SITE NOT SPECIFIED   Status: Acute   





(5) Colostomy in place


Code(s): Z93.3 - COLOSTOMY STATUS   Status: Chronic   





(6) Functional quadriplegia secondary to MS


Code(s): G35 - MULTIPLE SCLEROSIS; R53.2 - FUNCTIONAL QUADRIPLEGIA   Status: 

Chronic   





(7) Morbid obesity


Code(s): E66.01 - MORBID (SEVERE) OBESITY DUE TO EXCESS CALORIES   Status: 

Chronic   





(8) Multiple sclerosis, secondary progressive


Code(s): G35 - MULTIPLE SCLEROSIS   Status: Chronic   





(9) Neurogenic bladder


Code(s): N31.9 - NEUROMUSCULAR DYSFUNCTION OF BLADDER, UNSPECIFIED   Status: 

Chronic   





(10) Ulcerative colitis


Code(s): K51.90 - ULCERATIVE COLITIS, UNSPECIFIED, WITHOUT COMPLICATIONS   

Status: Chronic   





(11) Asthma exacerbation


Code(s): J45.901 - UNSPECIFIED ASTHMA WITH (ACUTE) EXACERBATION   Status: Acute

   


Qualifiers: 


   Asthma severity: moderate 





(12) Sepsis


Code(s): A41.9 - SEPSIS, UNSPECIFIED ORGANISM   Status: Resolved   





- Plan


Discharge home today.


Continue bronchodilators, steroid,and mucolytics.


Discharged on steroid taper and doxycycline CAUTION with moving on sitting and standing up  Hydration  Monitor sugars  NO chairs with wheels

## 2024-05-08 ENCOUNTER — HOSPITAL ENCOUNTER (OUTPATIENT)
Dept: HOSPITAL 92 - CSHMRI | Age: 64
Discharge: HOME | End: 2024-05-08
Attending: PSYCHIATRY & NEUROLOGY
Payer: MEDICARE

## 2024-05-08 DIAGNOSIS — G35: Primary | ICD-10-CM

## 2024-05-08 DIAGNOSIS — M48.54XA: ICD-10-CM

## 2024-05-08 DIAGNOSIS — M47.812: ICD-10-CM

## 2024-05-08 PROCEDURE — A9579 GAD-BASE MR CONTRAST NOS,1ML: HCPCS

## 2024-05-08 PROCEDURE — 70553 MRI BRAIN STEM W/O & W/DYE: CPT

## 2024-05-08 PROCEDURE — 72156 MRI NECK SPINE W/O & W/DYE: CPT

## 2024-05-08 PROCEDURE — 72157 MRI CHEST SPINE W/O & W/DYE: CPT
